# Patient Record
Sex: MALE | Race: WHITE | Employment: FULL TIME | URBAN - METROPOLITAN AREA
[De-identification: names, ages, dates, MRNs, and addresses within clinical notes are randomized per-mention and may not be internally consistent; named-entity substitution may affect disease eponyms.]

---

## 2017-01-13 ENCOUNTER — ALLIED HEALTH/NURSE VISIT (OUTPATIENT)
Dept: FAMILY MEDICINE | Facility: CLINIC | Age: 57
End: 2017-01-13
Payer: COMMERCIAL

## 2017-01-13 DIAGNOSIS — E29.1 TESTICULAR HYPOFUNCTION: Primary | ICD-10-CM

## 2017-01-13 PROCEDURE — 96372 THER/PROPH/DIAG INJ SC/IM: CPT

## 2017-01-13 PROCEDURE — 99207 ZZC NO CHARGE NURSE ONLY: CPT

## 2017-02-17 ENCOUNTER — ALLIED HEALTH/NURSE VISIT (OUTPATIENT)
Dept: FAMILY MEDICINE | Facility: CLINIC | Age: 57
End: 2017-02-17
Payer: COMMERCIAL

## 2017-02-17 DIAGNOSIS — E29.1 TESTICULAR HYPOFUNCTION: Primary | ICD-10-CM

## 2017-02-17 PROCEDURE — 99207 ZZC NO CHARGE NURSE ONLY: CPT

## 2017-02-17 PROCEDURE — 96372 THER/PROPH/DIAG INJ SC/IM: CPT

## 2017-03-03 ENCOUNTER — ALLIED HEALTH/NURSE VISIT (OUTPATIENT)
Dept: FAMILY MEDICINE | Facility: CLINIC | Age: 57
End: 2017-03-03
Payer: COMMERCIAL

## 2017-03-03 DIAGNOSIS — E29.1 TESTICULAR HYPOFUNCTION: Primary | ICD-10-CM

## 2017-03-03 PROCEDURE — 96372 THER/PROPH/DIAG INJ SC/IM: CPT

## 2017-03-03 PROCEDURE — 99207 ZZC NO CHARGE LOS: CPT

## 2017-03-17 ENCOUNTER — ALLIED HEALTH/NURSE VISIT (OUTPATIENT)
Dept: FAMILY MEDICINE | Facility: CLINIC | Age: 57
End: 2017-03-17
Payer: COMMERCIAL

## 2017-03-17 DIAGNOSIS — E29.1 TESTICULAR HYPOFUNCTION: Primary | ICD-10-CM

## 2017-03-17 PROCEDURE — 96372 THER/PROPH/DIAG INJ SC/IM: CPT

## 2017-03-17 PROCEDURE — 99207 ZZC NO CHARGE NURSE ONLY: CPT

## 2017-03-31 ENCOUNTER — ALLIED HEALTH/NURSE VISIT (OUTPATIENT)
Dept: FAMILY MEDICINE | Facility: CLINIC | Age: 57
End: 2017-03-31
Payer: COMMERCIAL

## 2017-03-31 DIAGNOSIS — E29.1 TESTICULAR HYPOFUNCTION: Primary | ICD-10-CM

## 2017-03-31 PROCEDURE — 99207 ZZC NO CHARGE NURSE ONLY: CPT

## 2017-03-31 PROCEDURE — 96372 THER/PROPH/DIAG INJ SC/IM: CPT

## 2017-04-10 DIAGNOSIS — E29.1 HYPOGONADISM MALE: ICD-10-CM

## 2017-04-10 NOTE — TELEPHONE ENCOUNTER
Left message for patient to return my call regarding Testosterone letter/injections. Patient was advised to ask for Drew SMITH.

## 2017-04-12 RX ORDER — TESTOSTERONE CYPIONATE 200 MG/ML
200 INJECTION, SOLUTION INTRAMUSCULAR
Qty: 1 ML | Refills: 5 | Status: CANCELLED | OUTPATIENT
Start: 2017-04-12

## 2017-04-12 NOTE — TELEPHONE ENCOUNTER
Joleen,    Patient is contacted and he plans to continue testosterone injections with us.  I see he has two testosterone prescriptions.?  I have pended one for printout for our pharmacy. Thank you, Dominique KOLB RN

## 2017-04-13 NOTE — TELEPHONE ENCOUNTER
Dr. Wallace or Covering Urology provider,    New policy for testosterone injections.  Need New Rx with 6 fills signed, printed and then taken over to our [facility]. I do see 2 Rx's for testosterone for this patient also.  Thank You, Dominique KOLB RN

## 2017-04-13 NOTE — TELEPHONE ENCOUNTER
LOV 5/2017  Last lab 8/2017    Please advise on refill and fill if appropriate.  See notes below.  Ninfa LOYOLA RN BSN PHN  Specialty Clinics

## 2017-04-20 DIAGNOSIS — E29.1 HYPOGONADISM MALE: ICD-10-CM

## 2017-04-20 RX ORDER — TESTOSTERONE CYPIONATE 200 MG/ML
200 INJECTION, SOLUTION INTRAMUSCULAR
Qty: 1 ML | Refills: 5 | OUTPATIENT
Start: 2017-04-20

## 2017-04-20 NOTE — TELEPHONE ENCOUNTER
Could you please send/bring a new Rx over for Testosterone injection?  The patient has an appointment for tomorrow 4/20/17 to receive the injection.    Thank you,  Batsheva Suero - Pharmacy Technician  [facility]  [phone]

## 2017-04-21 NOTE — TELEPHONE ENCOUNTER
Pt needs a new prescription to replace the current script for testosterone due to the changes with pharmacy. Medication ordered and pended for signature. Pt would like script to be brought to [facility] pharmacy and to be called when script is ready to make appt for injection. Please sign script.   Ninfa LOYOLA RN BSN PHN  Specialty Clinics

## 2017-04-25 RX ORDER — TESTOSTERONE CYPIONATE 200 MG/ML
200 INJECTION, SOLUTION INTRAMUSCULAR
Qty: 1 ML | Refills: 5 | Status: SHIPPED | OUTPATIENT
Start: 2017-04-25 | End: 2018-02-19

## 2017-05-12 ENCOUNTER — OFFICE VISIT (OUTPATIENT)
Dept: FAMILY MEDICINE | Facility: CLINIC | Age: 57
End: 2017-05-12
Payer: COMMERCIAL

## 2017-05-12 VITALS
SYSTOLIC BLOOD PRESSURE: 130 MMHG | RESPIRATION RATE: 16 BRPM | HEART RATE: 69 BPM | WEIGHT: 281.9 LBS | OXYGEN SATURATION: 94 % | BODY MASS INDEX: 40.36 KG/M2 | DIASTOLIC BLOOD PRESSURE: 88 MMHG | TEMPERATURE: 97.1 F | HEIGHT: 70 IN

## 2017-05-12 DIAGNOSIS — Z00.00 ROUTINE GENERAL MEDICAL EXAMINATION AT A HEALTH CARE FACILITY: Primary | ICD-10-CM

## 2017-05-12 DIAGNOSIS — E66.01 MORBID OBESITY DUE TO EXCESS CALORIES (H): ICD-10-CM

## 2017-05-12 DIAGNOSIS — R35.0 URINARY FREQUENCY: ICD-10-CM

## 2017-05-12 DIAGNOSIS — I10 BENIGN ESSENTIAL HYPERTENSION: ICD-10-CM

## 2017-05-12 LAB
ANION GAP SERPL CALCULATED.3IONS-SCNC: 8 MMOL/L (ref 3–14)
BUN SERPL-MCNC: 21 MG/DL (ref 7–30)
CALCIUM SERPL-MCNC: 9.4 MG/DL (ref 8.5–10.1)
CHLORIDE SERPL-SCNC: 103 MMOL/L (ref 94–109)
CHOLEST SERPL-MCNC: 172 MG/DL
CO2 SERPL-SCNC: 30 MMOL/L (ref 20–32)
CREAT SERPL-MCNC: 0.92 MG/DL (ref 0.66–1.25)
GFR SERPL CREATININE-BSD FRML MDRD: 86 ML/MIN/1.7M2
GLUCOSE SERPL-MCNC: 92 MG/DL (ref 70–99)
HDLC SERPL-MCNC: 48 MG/DL
LDLC SERPL CALC-MCNC: 106 MG/DL
NONHDLC SERPL-MCNC: 124 MG/DL
POTASSIUM SERPL-SCNC: 4.1 MMOL/L (ref 3.4–5.3)
SODIUM SERPL-SCNC: 141 MMOL/L (ref 133–144)
TRIGL SERPL-MCNC: 91 MG/DL

## 2017-05-12 PROCEDURE — 99396 PREV VISIT EST AGE 40-64: CPT | Performed by: NURSE PRACTITIONER

## 2017-05-12 PROCEDURE — 80048 BASIC METABOLIC PNL TOTAL CA: CPT | Performed by: NURSE PRACTITIONER

## 2017-05-12 PROCEDURE — 80061 LIPID PANEL: CPT | Performed by: NURSE PRACTITIONER

## 2017-05-12 PROCEDURE — 36415 COLL VENOUS BLD VENIPUNCTURE: CPT | Performed by: NURSE PRACTITIONER

## 2017-05-12 RX ORDER — HYDROCHLOROTHIAZIDE 25 MG/1
25 TABLET ORAL DAILY
Qty: 90 TABLET | Refills: 3 | Status: SHIPPED | OUTPATIENT
Start: 2017-05-12 | End: 2018-06-30

## 2017-05-12 RX ORDER — LOSARTAN POTASSIUM 100 MG/1
100 TABLET ORAL DAILY
Qty: 90 TABLET | Refills: 3 | Status: SHIPPED | OUTPATIENT
Start: 2017-05-12 | End: 2018-06-30

## 2017-05-12 RX ORDER — TAMSULOSIN HYDROCHLORIDE 0.4 MG/1
0.4 CAPSULE ORAL DAILY
Qty: 90 CAPSULE | Refills: 3 | Status: SHIPPED | OUTPATIENT
Start: 2017-05-12 | End: 2018-06-30

## 2017-05-12 NOTE — PROGRESS NOTES
SUBJECTIVE:     CC: Foreign Pond is an 56 year old male who presents for preventative health visit.     Healthy Habits:    Do you get at least three servings of calcium containing foods daily (dairy, green leafy vegetables, etc.)? yes    Amount of exercise or daily activities, outside of work: 7 day(s) per week/ 30-60 min    Problems taking medications regularly No    Medication side effects: No    Have you had an eye exam in the past two years? yes    Do you see a dentist twice per year? Yes    Do you have sleep apnea, excessive snoring or daytime drowsiness? Snores at night, less from losing weight      Hypertension Follow-up      Outpatient blood pressures are not being checked.    Low Salt Diet: no added salt       Today's PHQ-2 Score:   PHQ-2 ( 1999 Pf[employer]) 5/12/2017 7/19/2016   Q1: Little interest or pleasure in doing things 0 0   Q2: Feeling down, depressed or hopeless 0 0   PHQ-2 Score 0 0       Abuse: Current or Past(Physical, Sexual or Emotional)- No  Do you feel safe in your environment - Yes    Social History   Substance Use Topics     Smoking status: Never Smoker     Smokeless tobacco: Never Used     Alcohol use No     The patient does not drink >3 drinks per day nor >7 drinks per week.    Last PSA:   PSA   Date Value Ref Range Status   05/11/2015 0.56 0 - 4 ug/L Final       Recent Labs   Lab Test  12/18/15   0855  10/20/14   1009  06/29/13   0724   CHOL  172  182  184   HDL  43  41  36*   LDL  111*  114  105   TRIG  92  133  217*   CHOLHDLRATIO   --   4.4  5.0   NHDL  129   --    --      The 10-year ASCVD risk score (Bebetoamira WALTON Jr, et al., 2013) is: 5.9%    Values used to calculate the score:      Age: 56 years      Sex: Male      Is Non- : No      Diabetic: No      Tobacco smoker: No      Systolic Blood Pressure: 130 mmHg      Is BP treated: Yes      HDL Cholesterol: 43 mg/dL      Total Cholesterol: 172 mg/dL    Patient is eligible for use of low-dose aspirin for primary  "prevention of heart attack and stroke.  Provider has discussed aspirin with patient and our decision was:     Not Indicated:  Daily low-dose aspirin recommended for primary prevention, patient not eligible.        Reviewed orders with patient. Reviewed health maintenance and updated orders accordingly - Yes    Reviewed and updated as needed this visit by clinical staff  Tobacco  Allergies  Med Hx  Surg Hx  Fam Hx  Soc Hx        Reviewed and updated as needed this visit by Provider            ROS:  C: NEGATIVE for fever, chills, change in weight  I: NEGATIVE for worrisome rashes, moles or lesions  E: NEGATIVE for vision changes or irritation  ENT: NEGATIVE for ear, mouth and throat problems  R: NEGATIVE for significant cough or SOB  CV: NEGATIVE for chest pain, palpitations or peripheral edema  GI: NEGATIVE for nausea, abdominal pain, heartburn, or change in bowel habits   male: negative for dysuria, hematuria, decreased urinary stream, erectile dysfunction, urethral discharge  M: NEGATIVE for significant arthralgias or myalgia  N: NEGATIVE for weakness, dizziness or paresthesias  P: NEGATIVE for changes in mood or affect    Problem list, Medication list, Allergies, and Medical/Social/Surgical histories reviewed in [facility] and updated as appropriate.      OBJECTIVE:     /88  Pulse 69  Temp 97.1  F (36.2  C) (Tympanic)  Resp 16  Ht 5' 10.25\" (1.784 m)  Wt 281 lb 14.4 oz (127.9 kg)  SpO2 94%  BMI 40.16 kg/m2  EXAM:  GENERAL: healthy, alert and no distress  EYES: Eyes grossly normal to inspection, PERRL and conjunctivae and sclerae normal  HENT: ear canals and TM's normal, nose and mouth without ulcers or lesions  NECK: no adenopathy, no asymmetry, masses, or scars and thyroid normal to palpation  RESP: lungs clear to auscultation - no rales, rhonchi or wheezes  CV: regular rate and rhythm, normal S1 S2, no S3 or S4, no murmur, click or rub, no peripheral edema and peripheral pulses strong  ABDOMEN: " "soft, nontender, no hepatosplenomegaly, no masses and bowel sounds normal  MS: no gross musculoskeletal defects noted, no edema  NEURO: Normal strength and tone, mentation intact and speech normal  PSYCH: mentation appears normal, affect normal/bright    ASSESSMENT/PLAN:         ICD-10-CM    1. Routine general medical examination at a health care facility Z00.00 Lipid panel reflex to direct LDL     DISCONTINUED: ASPIRIN NOT PRESCRIBED (INTENTIONAL)   2. Morbid obesity due to excess calories (H) E66.01    3. Benign essential hypertension I10 losartan (COZAAR) 100 MG tablet     hydrochlorothiazide (HYDRODIURIL) 25 MG tablet     Basic metabolic panel   4. Urinary frequency R35.0 tamsulosin (FLOMAX) 0.4 MG capsule       COUNSELING:  Reviewed preventive health counseling, as reflected in patient instructions         reports that he has never smoked. He has never used smokeless tobacco.    Estimated body mass index is 40.16 kg/(m^2) as calculated from the following:    Height as of this encounter: 5' 10.25\" (1.784 m).    Weight as of this encounter: 281 lb 14.4 oz (127.9 kg).   Weight management plan: Discussed healthy diet and exercise guidelines and patient will follow up in 12 months in clinic to re-evaluate.      The risks, benefits and treatment options of prescribed medications or other treatments have been discussed with the patient. The patient verbalized their understanding and should call or follow up if no improvement or if they develop further problems.    RAVEN Benito [facility]  "

## 2017-05-12 NOTE — MR AVS SNAPSHOT
After Visit Summary   5/12/2017    Foreign Pond    MRN: [id number]           Patient Information     Date Of Birth          1960        Visit Information        Provider Department      5/12/2017 9:20 AM Joleen Garcia APRN [facility]        Today's Diagnoses     Routine general medical examination at a health care facility    -  1    Morbid obesity due to excess calories (H)        Benign essential hypertension        Urinary frequency          Care Instructions      Preventive Health Recommendations  Male Ages 50 - 64    Yearly exam:             See your health care provider every year in order to  o   Review health changes.   o   Discuss preventive care.    o   Review your medicines if your doctor has prescribed any.     Have a cholesterol test every 5 years, or more frequently if you are at risk for high cholesterol/heart disease.     Have a diabetes test (fasting glucose) every three years. If you are at risk for diabetes, you should have this test more often.     Have a colonoscopy at age 50, or have a yearly FIT test (stool test). These exams will check for colon cancer.      Talk with your health care provider about whether or not a prostate cancer screening test (PSA) is right for you.    You should be tested each year for STDs (sexually transmitted diseases), if you re at risk.     Shots: Get a flu shot each year. Get a tetanus shot every 10 years.     Nutrition:    Eat at least 5 servings of fruits and vegetables daily.     Eat whole-grain bread, whole-wheat pasta and brown rice instead of white grains and rice.     Talk to your provider about Calcium and Vitamin D.     Lifestyle    Exercise for at least 150 minutes a week (30 minutes a day, 5 days a week). This will help you control your weight and prevent disease.     Limit alcohol to one drink per day.     No smoking.     Wear sunscreen to prevent skin cancer.     See your dentist every six months  "for an exam and cleaning.     See your eye doctor every 1 to 2 years.          Follow-ups after your visit        Who to contact     If you have questions or need follow up information about today's clinic visit or your schedule please contact [facility] directly at [phone].  Normal or non-critical lab and imaging results will be communicated to you by MyChart, letter or phone within 4 business days after the clinic has received the results. If you do not hear from us within 7 days, please contact the clinic through MyChart or phone. If you have a critical or abnormal lab result, we will notify you by phone as soon as possible.  Submit refill requests through Next Caller or call your pharmacy and they will forward the refill request to us. Please allow 3 business days for your refill to be completed.          Additional Information About Your Visit        MyChart Information     Next Caller lets you send messages to your doctor, view your test results, renew your prescriptions, schedule appointments and more. To sign up, go to [email or web address] . Click on \"Log in\" on the left side of the screen, which will take you to the Welcome page. Then click on \"Sign up Now\" on the right side of the page.     You will be asked to enter the access code listed below, as well as some personal information. Please follow the directions to create your username and password.     Your access code is: [email or web address]  Expires: 2017 11:38 AM     Your access code will  in 90 days. If you need help or a new code, please call your [facility] or [phone].        Care EveryWhere ID     This is your Care EveryWhere ID. This could be used by other organizations to access your [place] medical records  [id number]        Your Vitals Were     Pulse Temperature Respirations Height Pulse Oximetry BMI (Body Mass Index)    69 97.1  F (36.2  C) (Tympanic) 16 5' 10.25\" (1.784 m) 94% 40.16 kg/m2       Blood Pressure " from Last 3 Encounters:   05/12/17 (!) 131/91   12/12/16 113/73   07/19/16 135/73    Weight from Last 3 Encounters:   05/12/17 281 lb 14.4 oz (127.9 kg)   12/12/16 288 lb (130.6 kg)   10/28/16 (!) 306 lb (138.8 kg)              We Performed the Following     Basic metabolic panel     Lipid panel reflex to direct LDL          Today's Medication Changes          These changes are accurate as of: 5/12/17  9:39 AM.  If you have any questions, ask your nurse or doctor.               Start taking these medicines.        Dose/Directions    ASPIRIN NOT PRESCRIBED   Commonly known as:  INTENTIONAL   Used for:  Routine general medical examination at a health care facility   Started by:  Joleen Garcia APRN CNP        Please choose reason not prescribed, below   Quantity:  1 each   Refills:  0            Where to get your medicines      These medications were sent to [id number] [address], [facility] [address]     Phone:  [phone]     hydrochlorothiazide 25 MG tablet    losartan 100 MG tablet    tamsulosin 0.4 MG capsule         Some of these will need a paper prescription and others can be bought over the counter.  Ask your nurse if you have questions.     You don't need a prescription for these medications     ASPIRIN NOT PRESCRIBED                Primary Care Provider Office Phone # Fax #    RAVEN Ulloa [phone]       [facility] [address] [facility] [address]        Thank you!     Thank you for choosing [facility]  for your care. Our goal is always to provide you with excellent care. Hearing back from our patients is one way we can continue to improve our services. Please take a few minutes to complete the written survey that you may receive in the mail after your visit with us. Thank you!             Your Updated Medication List - Protect others around you:  "Learn how to safely use, store and throw away your medicines at www.disposemymeds.org.          This list is accurate as of: 5/12/17  9:39 AM.  Always use your most recent med list.                   Brand Name Dispense Instructions for use    aspirin 81 MG tablet     1 tablet    Take 1 tablet by mouth daily.       ASPIRIN NOT PRESCRIBED    INTENTIONAL    1 each    Please choose reason not prescribed, below       hydrochlorothiazide 25 MG tablet    HYDRODIURIL    90 tablet    Take 1 tablet (25 mg) by mouth daily       losartan 100 MG tablet    COZAAR    90 tablet    Take 1 tablet (100 mg) by mouth daily       tamsulosin 0.4 MG capsule    FLOMAX    90 capsule    Take 1 capsule (0.4 mg) by mouth daily       * testosterone cypionate 200 MG/ML injection    DEPOTESTOTERONE    1 mL    Inject 1 mL (200 mg) into the muscle every 14 days       * testosterone cypionate 200 MG/ML injection    DEPOTESTOTERONE    1 mL    Inject 1 mL (200 mg) into the muscle every 14 days To be given in clinic       UNABLE TO FIND      States he takes 5 tablets of \"isogenic vitamins\"       * Notice:  This list has 2 medication(s) that are the same as other medications prescribed for you. Read the directions carefully, and ask your doctor or other care provider to review them with you.      "

## 2017-05-12 NOTE — LETTER
[facility]  [address] [facility] [address]  [phone]      May 12, 2017      Foreign Pond  [address] [facility] [address]        Dear Foreign,        We are writing to inform you of the results from your recent lab work, included is a copy of those results.  Component      Latest Ref Rng & Units 5/12/2017   Sodium      133 - 144 mmol/L 141   Potassium      3.4 - 5.3 mmol/L 4.1   Chloride      94 - 109 mmol/L 103   Carbon Dioxide      20 - 32 mmol/L 30   Anion Gap      3 - 14 mmol/L 8   Glucose      70 - 99 mg/dL 92   Urea Nitrogen      7 - 30 mg/dL 21   Creatinine      0.66 - 1.25 mg/dL 0.92   GFR Estimate      >60 mL/min/1.7m2 86   GFR Estimate If Black      >60 mL/min/1.7m2 >90 . . .   Calcium      8.5 - 10.1 mg/dL 9.4   Cholesterol      <200 mg/dL 172   Triglycerides      <150 mg/dL 91   HDL Cholesterol      >39 mg/dL 48   LDL Cholesterol Calculated      <100 mg/dL 106 (H)   Non HDL Cholesterol      <130 mg/dL 124     Blood sugar is normal.  Kidney function and electrolytes are normal.    Cholesterol is unchanged from last year - decreased from 2-3 years ago.    If you have any questions, please do not hesitate to call our office.      Sincerely,  Joleen Garcia, CNP

## 2017-05-12 NOTE — NURSING NOTE
"Chief Complaint   Patient presents with     Physical     Boy        Initial BP (!) 131/91 (BP Location: Right arm, Patient Position: Chair, Cuff Size: Adult Large)  Pulse 69  Temp 97.1  F (36.2  C) (Tympanic)  Resp 16  Ht 5' 10.25\" (1.784 m)  Wt 281 lb 14.4 oz (127.9 kg)  SpO2 94%  BMI 40.16 kg/m2 Estimated body mass index is 40.16 kg/(m^2) as calculated from the following:    Height as of this encounter: 5' 10.25\" (1.784 m).    Weight as of this encounter: 281 lb 14.4 oz (127.9 kg).  Medication Reconciliation: complete  "

## 2017-05-19 ENCOUNTER — ALLIED HEALTH/NURSE VISIT (OUTPATIENT)
Dept: FAMILY MEDICINE | Facility: CLINIC | Age: 57
End: 2017-05-19
Payer: COMMERCIAL

## 2017-05-19 DIAGNOSIS — E29.1 TESTICULAR HYPOFUNCTION: Primary | ICD-10-CM

## 2017-05-19 PROCEDURE — 99207 ZZC NO CHARGE NURSE ONLY: CPT

## 2017-06-02 ENCOUNTER — ALLIED HEALTH/NURSE VISIT (OUTPATIENT)
Dept: FAMILY MEDICINE | Facility: CLINIC | Age: 57
End: 2017-06-02
Payer: COMMERCIAL

## 2017-06-02 DIAGNOSIS — E29.1 TESTICULAR HYPOFUNCTION: Primary | ICD-10-CM

## 2017-06-02 PROCEDURE — 96372 THER/PROPH/DIAG INJ SC/IM: CPT

## 2017-06-02 PROCEDURE — 99207 ZZC NO CHARGE NURSE ONLY: CPT

## 2017-06-16 ENCOUNTER — ALLIED HEALTH/NURSE VISIT (OUTPATIENT)
Dept: FAMILY MEDICINE | Facility: CLINIC | Age: 57
End: 2017-06-16
Payer: COMMERCIAL

## 2017-06-16 DIAGNOSIS — E29.1 TESTICULAR HYPOFUNCTION: Primary | ICD-10-CM

## 2017-06-16 PROCEDURE — 99207 ZZC NO CHARGE NURSE ONLY: CPT

## 2017-06-30 ENCOUNTER — OFFICE VISIT (OUTPATIENT)
Dept: FAMILY MEDICINE | Facility: CLINIC | Age: 57
End: 2017-06-30
Payer: COMMERCIAL

## 2017-06-30 DIAGNOSIS — E29.1 TESTICULAR HYPOFUNCTION: Primary | ICD-10-CM

## 2017-06-30 PROCEDURE — 99207 ZZC NO CHARGE NURSE ONLY: CPT

## 2017-06-30 PROCEDURE — 96372 THER/PROPH/DIAG INJ SC/IM: CPT

## 2017-06-30 NOTE — MR AVS SNAPSHOT
"              After Visit Summary   6/30/2017    Foreign Pond    MRN: [id number]           Patient Information     Date Of Birth          1960        Visit Information        Provider Department      6/30/2017 9:00 AM [place] FP/IM RN [facility]        Today's Diagnoses     Testicular hypofunction    -  1       Follow-ups after your visit        Your next 10 appointments already scheduled     Jul 14, 2017  9:00 AM CDT   [facility]   [facility]    [address] [facility] [address] [phone]            Jul 28, 2017  9:00 AM CDT   SHORT with [place] [facility] FP/IM RN   [facility]    [address] [facility] [facility] [address] [phone]              Who to contact     If you have questions or need follow up information about today's clinic visit or your schedule please contact [facility] directly at [phone].  Normal or non-critical lab and imaging results will be communicated to you by [employer], letter or phone within 4 business days after the clinic has received the results. If you do not hear from us within 7 days, please contact the clinic through [employer] or phone. If you have a critical or abnormal lab result, we will notify you by phone as soon as possible.  Submit refill requests through COGEON or call your pharmacy and they will forward the refill request to us. Please allow 3 business days for your refill to be completed.          Additional Information About Your Visit        [employer] media Information     COGEON lets you send messages to your doctor, view your test results, renew your prescriptions, schedule appointments and more. To sign up, go to [email or web address] . Click on \"Log in\" on the left side of the screen, which will take you to the Welcome page. Then click on \"Sign up Now\" on the right side of the page.     You will be asked to enter the access code " listed below, as well as some personal information. Please follow the directions to create your username and password.     Your access code is: [email or web address]  Expires: 2017  9:21 AM     Your access code will  in 90 days. If you need help or a new code, please call your [place] clinic or [phone].        Care EveryWhere ID     This is your Care EveryWhere ID. This could be used by other organizations to access your [place] medical records  [id number]         Blood Pressure from Last 3 Encounters:   17 130/88   16 113/73   16 135/73    Weight from Last 3 Encounters:   17 281 lb 14.4 oz (127.9 kg)   16 288 lb (130.6 kg)   10/28/16 (!) 306 lb (138.8 kg)              We Performed the Following     INJECTION INTRAMUSCULAR OR SUB-Q        Primary Care Provider Office Phone # Fax #    Joleen Geiger Ta Garcia, RAVEN [facility] [phone]       [facility] [address] [facility] [address]        Equal Access to Services     EDDIE PASCUAL : Hadii aad ku hadasho Soomaali, waaxda luqadaha, qaybta kaalmada adeegyada, waxay idiin haymarysen albino hernandez . So [facility] [phone].    ATENCIÓN: Si habla español, tiene a barrett disposición servicios gratuitos de asistencia lingüística. Llame al [phone].    We comply with applicable federal civil rights laws and [place] laws. We do not discriminate on the basis of race, color, national origin, age, disability sex, sexual orientation or gender identity.            Thank you!     Thank you for choosing [facility]  for your care. Our goal is always to provide you with excellent care. Hearing back from our patients is one way we can continue to improve our services. Please take a few minutes to complete the written survey that you may receive in the mail after your visit with us. Thank you!             Your Updated Medication List - Protect others around you: Learn how to safely use, store and  "throw away your medicines at www.disposemymeds.org.          This list is accurate as of: 6/30/17  9:14 AM.  Always use your most recent med list.                   Brand Name Dispense Instructions for use Diagnosis    aspirin 81 MG tablet     1 tablet    Take 1 tablet by mouth daily.    HTN (hypertension)       hydrochlorothiazide 25 MG tablet    HYDRODIURIL    90 tablet    Take 1 tablet (25 mg) by mouth daily    Benign essential hypertension       losartan 100 MG tablet    COZAAR    90 tablet    Take 1 tablet (100 mg) by mouth daily    Benign essential hypertension       tamsulosin 0.4 MG capsule    FLOMAX    90 capsule    Take 1 capsule (0.4 mg) by mouth daily    Urinary frequency       testosterone cypionate 200 MG/ML injection    DEPOTESTOTERONE    1 mL    Inject 1 mL (200 mg) into the muscle every 14 days To be given in clinic    Hypogonadism male       UNABLE TO FIND      States he takes 5 tablets of \"isogenic vitamins\"          "

## 2017-07-28 ENCOUNTER — OFFICE VISIT (OUTPATIENT)
Dept: FAMILY MEDICINE | Facility: CLINIC | Age: 57
End: 2017-07-28
Payer: COMMERCIAL

## 2017-07-28 DIAGNOSIS — E29.1 TESTICULAR HYPOFUNCTION: Primary | ICD-10-CM

## 2017-07-28 PROCEDURE — 99207 ZZC NO CHARGE NURSE ONLY: CPT

## 2017-07-28 NOTE — MR AVS SNAPSHOT
"              After Visit Summary   7/28/2017    Foreign Pond    MRN: [id number]           Patient Information     Date Of Birth          1960        Visit Information        Provider Department      7/28/2017 9:00 AM [place] FP/IM RN [facility]        Today's Diagnoses     Testicular hypofunction    -  1       Follow-ups after your visit        Your next 10 appointments already scheduled     Aug 11, 2017  9:00 AM CDT   SHORT with [place] FP/IM RN   [facility]    [address] [address]   [phone]            Aug 25, 2017  9:00 AM CDT   SHORT with [place] FP/IM RN   [facility]    [address] [address]   [phone]            Sep 08, 2017  9:00 AM CDT   SHORT with [place] FP/IM RN   [facility]    [address] [address]   [phone]              Who to contact     If you have questions or need follow up information about today's clinic visit or your schedule please contact [facility] directly at [phone].  Normal or non-critical lab and imaging results will be communicated to you by Novaluxhart, letter or phone within 4 business days after the clinic has received the results. If you do not hear from us within 7 days, please contact the clinic through Novaluxhart or phone. If you have a critical or abnormal lab result, we will notify you by phone as soon as possible.  Submit refill requests through Beryllium or call your pharmacy and they will forward the refill request to us. Please allow 3 business days for your refill to be completed.          Additional Information About Your Visit        NovaluxharHomeSpace Information     Beryllium lets you send messages to your doctor, view your test results, renew your prescriptions, schedule appointments and more. To sign up, go to [email or web address] . Click on \"Log " "in\" on the left side of the screen, which will take you to the Welcome page. Then click on \"Sign up Now\" on the right side of the page.     You will be asked to enter the access code listed below, as well as some personal information. Please follow the directions to create your username and password.     Your access code is: [email or web address]  Expires: 2017  9:21 AM     Your access code will  in 90 days. If you need help or a new code, please call your [facility] clinic or [phone].        Care EveryWhere ID     This is your Care EveryWhere ID. This could be used by other organizations to access your [facility] medical records  [id number]         Blood Pressure from Last 3 Encounters:   17 130/88   16 113/73   16 135/73    Weight from Last 3 Encounters:   17 281 lb 14.4 oz (127.9 kg)   16 288 lb (130.6 kg)   10/28/16 (!) 306 lb (138.8 kg)              Today, you had the following     No orders found for display       Primary Care Provider Office Phone # Fax #    Joleen Geiger RAVEN [facility] [phone]       [facility] [address] [address]        Equal Access to Services     EDDIE PASCUAL : Hadii aad ku hadasho Soaleshaali, waaxda luqadaha, qaybta kaalmada adeegyada, samantha bhatti. So [facility] [phone].    ATENCIÓN: Si habla español, tiene a barrett disposición servicios gratuitos de asistencia lingüística. Dorothy al [phone].    We comply with applicable federal civil rights laws and [place] laws. We do not discriminate on the basis of race, color, national origin, age, disability sex, sexual orientation or gender identity.            Thank you!     Thank you for choosing [facility]  for your care. Our goal is always to provide you with excellent care. Hearing back from our patients is one way we can continue to improve our services. Please take a few minutes to complete the written survey " "that you may receive in the mail after your visit with us. Thank you!             Your Updated Medication List - Protect others around you: Learn how to safely use, store and throw away your medicines at www.disposemymeds.org.          This list is accurate as of: 7/28/17 11:59 PM.  Always use your most recent med list.                   Brand Name Dispense Instructions for use Diagnosis    aspirin 81 MG tablet     1 tablet    Take 1 tablet by mouth daily.    HTN (hypertension)       hydrochlorothiazide 25 MG tablet    HYDRODIURIL    90 tablet    Take 1 tablet (25 mg) by mouth daily    Benign essential hypertension       losartan 100 MG tablet    COZAAR    90 tablet    Take 1 tablet (100 mg) by mouth daily    Benign essential hypertension       tamsulosin 0.4 MG capsule    FLOMAX    90 capsule    Take 1 capsule (0.4 mg) by mouth daily    Urinary frequency       testosterone cypionate 200 MG/ML injection    DEPOTESTOTERONE    1 mL    Inject 1 mL (200 mg) into the muscle every 14 days To be given in clinic    Hypogonadism male       UNABLE TO FIND      States he takes 5 tablets of \"isogenic vitamins\"          "

## 2017-08-11 ENCOUNTER — OFFICE VISIT (OUTPATIENT)
Dept: FAMILY MEDICINE | Facility: CLINIC | Age: 57
End: 2017-08-11
Payer: COMMERCIAL

## 2017-08-11 DIAGNOSIS — E29.1 TESTICULAR HYPOFUNCTION: Primary | ICD-10-CM

## 2017-08-11 PROCEDURE — 99207 ZZC NO CHARGE NURSE ONLY: CPT

## 2017-08-11 PROCEDURE — 96372 THER/PROPH/DIAG INJ SC/IM: CPT

## 2017-08-11 NOTE — MR AVS SNAPSHOT
"              After Visit Summary   8/11/2017    [place]    MRN: [id number]           Patient Information     Date Of Birth          1960        Visit Information        Provider Department      8/11/2017 9:00 AM [place] FP/IM RN [facility]        Today's Diagnoses     Testicular hypofunction    -  1       Follow-ups after your visit        Your next 10 appointments already scheduled     Aug 25, 2017  9:00 AM CDT   SHORT with [facility] FP/IM RN   [facility]    [address] [address]   [phone]            Sep 08, 2017  9:00 AM CDT   SHORT with [place] FP/IM RN   [facility]    [address] [address]   [phone]              Who to contact     If you have questions or need follow up information about today's clinic visit or your schedule please contact [facility] directly at [phone].  Normal or non-critical lab and imaging results will be communicated to you by Canadian Digital Media Networkhart, letter or phone within 4 business days after the clinic has received the results. If you do not hear from us within 7 days, please contact the clinic through Canadian Digital Media Networkhart or phone. If you have a critical or abnormal lab result, we will notify you by phone as soon as possible.  Submit refill requests through BCNX or call your pharmacy and they will forward the refill request to us. Please allow 3 business days for your refill to be completed.          Additional Information About Your Visit        Canadian Digital Media NetworkharInvictus Marketing Information     BCNX lets you send messages to your doctor, view your test results, renew your prescriptions, schedule appointments and more. To sign up, go to [email or web address] . Click on \"Log in\" on the left side of the screen, which will take you to the Welcome page. Then click on \"Sign up Now\" on the right side of the page.     You will be asked to enter the access code " listed below, as well as some personal information. Please follow the directions to create your username and password.     Your access code is: [email or web address]  Expires: 2017  9:21 AM     Your access code will  in 90 days. If you need help or a new code, please call your [facility] clinic or [phone].        Care EveryWhere ID     This is your Care EveryWhere ID. This could be used by other organizations to access your [facility] medical records  [id number]         Blood Pressure from Last 3 Encounters:   17 130/88   16 113/73   16 135/73    Weight from Last 3 Encounters:   17 281 lb 14.4 oz (127.9 kg)   16 288 lb (130.6 kg)   10/28/16 (!) 306 lb (138.8 kg)              We Performed the Following     THER/PROPH/DIAG INJ, SC/IM        Primary Care Provider Office Phone # Fax #    Joleen RAVEN [facility] [phone] [address] [address]        Equal Access to Services     EDDIE PASCUAL AH: Hadii aad ku hadasho Soomaali, waaxda luqadaha, qaybta kaalmada adeegyada, waxay idiin hayaan albino hernandez . So [facility] [phone].    ATENCIÓN: Si habla español, tiene a barrett disposición servicios gratuitos de asistencia lingüística. Llame al [phone].    We comply with applicable federal civil rights laws and [place] laws. We do not discriminate on the basis of race, color, national origin, age, disability sex, sexual orientation or gender identity.            Thank you!     Thank you for choosing [facility]  for your care. Our goal is always to provide you with excellent care. Hearing back from our patients is one way we can continue to improve our services. Please take a few minutes to complete the written survey that you may receive in the mail after your visit with us. Thank you!             Your Updated Medication List - Protect others around you: Learn how to safely use, store and throw away your medicines at  "www.disposemymeds.org.          This list is accurate as of: 8/11/17  9:00 AM.  Always use your most recent med list.                   Brand Name Dispense Instructions for use Diagnosis    aspirin 81 MG tablet     1 tablet    Take 1 tablet by mouth daily.    HTN (hypertension)       hydrochlorothiazide 25 MG tablet    HYDRODIURIL    90 tablet    Take 1 tablet (25 mg) by mouth daily    Benign essential hypertension       losartan 100 MG tablet    COZAAR    90 tablet    Take 1 tablet (100 mg) by mouth daily    Benign essential hypertension       tamsulosin 0.4 MG capsule    FLOMAX    90 capsule    Take 1 capsule (0.4 mg) by mouth daily    Urinary frequency       testosterone cypionate 200 MG/ML injection    DEPOTESTOTERONE    1 mL    Inject 1 mL (200 mg) into the muscle every 14 days To be given in clinic    Hypogonadism male       UNABLE TO FIND      States he takes 5 tablets of \"isogenic vitamins\"          "

## 2017-08-11 NOTE — NURSING NOTE
Patient here for testosterone injection. Dominique KOLB RN  Patient needs new Rx for testosterone and blood level drawn.  Patient is aware and will take care of this. Dominique KOLB RN

## 2017-10-17 ENCOUNTER — HOSPITAL ENCOUNTER (EMERGENCY)
Facility: CLINIC | Age: 57
Discharge: HOME | End: 2017-10-17
Attending: EMERGENCY MEDICINE | Admitting: EMERGENCY MEDICINE
Payer: COMMERCIAL

## 2017-10-17 ENCOUNTER — APPOINTMENT (OUTPATIENT)
Dept: GENERAL RADIOLOGY | Facility: CLINIC | Age: 57
End: 2017-10-17
Attending: EMERGENCY MEDICINE
Payer: COMMERCIAL

## 2017-10-17 VITALS
DIASTOLIC BLOOD PRESSURE: 79 MMHG | SYSTOLIC BLOOD PRESSURE: 111 MMHG | OXYGEN SATURATION: 93 % | RESPIRATION RATE: 14 BRPM | TEMPERATURE: 98.2 F

## 2017-10-17 DIAGNOSIS — M79.622 LEFT AXILLARY PAIN: ICD-10-CM

## 2017-10-17 DIAGNOSIS — M25.512 ACUTE PAIN OF LEFT SHOULDER: ICD-10-CM

## 2017-10-17 LAB
ANION GAP SERPL CALCULATED.3IONS-SCNC: 4 MMOL/L (ref 3–14)
BASOPHILS # BLD AUTO: 0 10E9/L (ref 0–0.2)
BASOPHILS NFR BLD AUTO: 0.4 %
BUN SERPL-MCNC: 24 MG/DL (ref 7–30)
CALCIUM SERPL-MCNC: 9.1 MG/DL (ref 8.5–10.1)
CHLORIDE SERPL-SCNC: 102 MMOL/L (ref 94–109)
CO2 SERPL-SCNC: 31 MMOL/L (ref 20–32)
CREAT SERPL-MCNC: 1.02 MG/DL (ref 0.66–1.25)
DIFFERENTIAL METHOD BLD: NORMAL
EOSINOPHIL # BLD AUTO: 0.1 10E9/L (ref 0–0.7)
EOSINOPHIL NFR BLD AUTO: 1.5 %
ERYTHROCYTE [DISTWIDTH] IN BLOOD BY AUTOMATED COUNT: 13.4 % (ref 10–15)
GFR SERPL CREATININE-BSD FRML MDRD: 76 ML/MIN/1.7M2
GLUCOSE SERPL-MCNC: 92 MG/DL (ref 70–99)
HCT VFR BLD AUTO: 44.6 % (ref 40–53)
HGB BLD-MCNC: 15.5 G/DL (ref 13.3–17.7)
IMM GRANULOCYTES # BLD: 0 10E9/L (ref 0–0.4)
IMM GRANULOCYTES NFR BLD: 0.1 %
LYMPHOCYTES # BLD AUTO: 1.2 10E9/L (ref 0.8–5.3)
LYMPHOCYTES NFR BLD AUTO: 16.1 %
MCH RBC QN AUTO: 29.1 PG (ref 26.5–33)
MCHC RBC AUTO-ENTMCNC: 34.8 G/DL (ref 31.5–36.5)
MCV RBC AUTO: 84 FL (ref 78–100)
MONOCYTES # BLD AUTO: 0.5 10E9/L (ref 0–1.3)
MONOCYTES NFR BLD AUTO: 6.8 %
NEUTROPHILS # BLD AUTO: 5.4 10E9/L (ref 1.6–8.3)
NEUTROPHILS NFR BLD AUTO: 75.1 %
PLATELET # BLD AUTO: 236 10E9/L (ref 150–450)
POTASSIUM SERPL-SCNC: 3.4 MMOL/L (ref 3.4–5.3)
RBC # BLD AUTO: 5.33 10E12/L (ref 4.4–5.9)
SODIUM SERPL-SCNC: 137 MMOL/L (ref 133–144)
TROPONIN I SERPL-MCNC: <0.015 UG/L (ref 0–0.04)
WBC # BLD AUTO: 7.2 10E9/L (ref 4–11)

## 2017-10-17 PROCEDURE — 84484 ASSAY OF TROPONIN QUANT: CPT | Performed by: EMERGENCY MEDICINE

## 2017-10-17 PROCEDURE — 71101 X-RAY EXAM UNILAT RIBS/CHEST: CPT | Mod: LT

## 2017-10-17 PROCEDURE — 73030 X-RAY EXAM OF SHOULDER: CPT | Mod: LT

## 2017-10-17 PROCEDURE — 80048 BASIC METABOLIC PNL TOTAL CA: CPT | Performed by: EMERGENCY MEDICINE

## 2017-10-17 PROCEDURE — 99285 EMERGENCY DEPT VISIT HI MDM: CPT

## 2017-10-17 PROCEDURE — 85025 COMPLETE CBC W/AUTO DIFF WBC: CPT | Performed by: EMERGENCY MEDICINE

## 2017-10-17 PROCEDURE — 93010 ELECTROCARDIOGRAM REPORT: CPT | Mod: Z6 | Performed by: EMERGENCY MEDICINE

## 2017-10-17 PROCEDURE — 99285 EMERGENCY DEPT VISIT HI MDM: CPT | Mod: 25 | Performed by: EMERGENCY MEDICINE

## 2017-10-17 PROCEDURE — 93005 ELECTROCARDIOGRAM TRACING: CPT

## 2017-10-17 NOTE — ED AVS SNAPSHOT
[facility] Emergency Department    [address] [address]    Phone:  [phone]    Fax:  [phone]                                       [place] P[place]   MRN: [id number]    Department:  [facility] Emergency Department   Date of Visit:  10/17/2017           After Visit Summary Signature Page     I have received my discharge instructions, and my questions have been answered. I have discussed any challenges I see with this plan with the nurse or doctor.    ..........................................................................................................................................  Patient/Patient Representative Signature      ..........................................................................................................................................  Patient Representative Print Name and Relationship to Patient    ..................................................               ................................................  Date                                            Time    ..........................................................................................................................................  Reviewed by Signature/Title    ...................................................              ..............................................  Date                                                            Time

## 2017-10-17 NOTE — LETTER
To Whom it may concern:      [place] was seen in our Emergency Department today, 10/17/17.  He may return to work tomorrow without limitation.    Sincerely,        Garthriley Herrera MD

## 2017-10-17 NOTE — ED AVS SNAPSHOT
[facility] Emergency Department    [address] [place] [address]    Phone:  [phone]    Fax:  [phone]                                       [place] Pond   MRN: [id number]    Department:  [facility] Emergency Department   Date of Visit:  10/17/2017           Patient Information     Date Of Birth          1960        Your diagnoses for this visit were:     Acute pain of left shoulder     Left axillary pain        You were seen by Garth Herrera MD.      Follow-up Information     Schedule an appointment as soon as possible for a visit with Parish Sosa MD.    Specialty:  Orthopedics    Why:  If symptoms worsen or if persist.    Contact information:    [address]  [place] [address]  [phone]        Discharge References/Attachments     ROTATOR CUFF TENDONITIS, UNDERSTANDING (ENGLISH)      24 Hour Appointment Hotline       To make an appointment at any [facility] clinic, call [phone] ([phone]). If you don't have a family doctor or clinic, we will help you find one. [facility] clinics are conveniently located to serve the needs of you and your family.             Review of your medicines      Our records show that you are taking the medicines listed below. If these are incorrect, please call your family doctor or clinic.        Dose / Directions Last dose taken    aspirin 81 MG tablet   Dose:  1 tablet   Quantity:  1 tablet        Take 1 tablet by mouth daily.   Refills:  3        hydrochlorothiazide 25 MG tablet   Commonly known as:  HYDRODIURIL   Dose:  25 mg   Quantity:  90 tablet        Take 1 tablet (25 mg) by mouth daily   Refills:  3        losartan 100 MG tablet   Commonly known as:  COZAAR   Dose:  100 mg   Quantity:  90 tablet        Take 1 tablet (100 mg) by mouth daily   Refills:  3        tamsulosin 0.4 MG capsule   Commonly known as:  FLOMAX   Dose:  0.4 mg   Quantity:  90 capsule        Take 1 capsule (0.4 mg) by mouth daily   Refills:  3      "   testosterone cypionate 200 MG/ML injection   Commonly known as:  DEPOTESTOTERONE   Dose:  200 mg   Quantity:  1 mL        Inject 1 mL (200 mg) into the muscle every 14 days To be given in clinic   Refills:  5        UNABLE TO FIND   Dose:  5 tablet        Take 5 tablets by mouth 2 times daily \"Isogenic vitamins\"   Refills:  0                Procedures and tests performed during your visit     Basic metabolic panel    CBC with platelets differential    Cardiac Continuous Monitoring    EKG 12-lead, tracing only    Peripheral IV catheter    Shoulder XR, 2 view left    Troponin I    XR Ribs & Chest Lt 3v      Orders Needing Specimen Collection     None      Pending Results     No orders found from 10/15/2017 to 10/18/2017.            Pending Culture Results     No orders found from 10/15/2017 to 10/18/2017.            Pending Results Instructions     If you had any lab results that were not finalized at the time of your Discharge, you can call the ED Lab Result RN at [phone]. You will be contacted by this team for any positive Lab results or changes in treatment. The nurses are available 7 days a week from 10A to 6:30P.  You can leave a message 24 hours per day and they will return your call.        Test Results From Your Hospital Stay        10/17/2017 11:59 AM      Component Results     Component Value Ref Range & Units Status    WBC 7.2 4.0 - 11.0 10e9/L Final    RBC Count 5.33 4.4 - 5.9 10e12/L Final    Hemoglobin 15.5 13.3 - 17.7 g/dL Final    Hematocrit 44.6 40.0 - 53.0 % Final    MCV 84 78 - 100 fl Final    MCH 29.1 26.5 - 33.0 pg Final    MCHC 34.8 31.5 - 36.5 g/dL Final    RDW 13.4 10.0 - 15.0 % Final    Platelet Count 236 150 - 450 10e9/L Final    Diff Method Automated Method  Final    % Neutrophils 75.1 % Final    % Lymphocytes 16.1 % Final    % Monocytes 6.8 % Final    % Eosinophils 1.5 % Final    % Basophils 0.4 % Final    % Immature Granulocytes 0.1 % Final    Absolute Neutrophil 5.4 1.6 - 8.3 " 10e9/L Final    Absolute Lymphocytes 1.2 0.8 - 5.3 10e9/L Final    Absolute Monocytes 0.5 0.0 - 1.3 10e9/L Final    Absolute Eosinophils 0.1 0.0 - 0.7 10e9/L Final    Absolute Basophils 0.0 0.0 - 0.2 10e9/L Final    Abs Immature Granulocytes 0.0 0 - 0.4 10e9/L Final         10/17/2017 12:23 PM      Component Results     Component Value Ref Range & Units Status    Sodium 137 133 - 144 mmol/L Final    Potassium 3.4 3.4 - 5.3 mmol/L Final    Chloride 102 94 - 109 mmol/L Final    Carbon Dioxide 31 20 - 32 mmol/L Final    Anion Gap 4 3 - 14 mmol/L Final    Glucose 92 70 - 99 mg/dL Final    Urea Nitrogen 24 7 - 30 mg/dL Final    Creatinine 1.02 0.66 - 1.25 mg/dL Final    GFR Estimate 76 >60 mL/min/1.7m2 Final    Non  GFR Calc    GFR Estimate If Black >90 >60 mL/min/1.7m2 Final    African American GFR Calc    Calcium 9.1 8.5 - 10.1 mg/dL Final         10/17/2017 12:23 PM      Component Results     Component Value Ref Range & Units Status    Troponin I ES <0.015 0.000 - 0.045 ug/L Final    The 99th percentile for upper reference range is 0.045 ug/L.  Troponin values   in the range of 0.045 - 0.120 ug/L may be associated with risks of adverse   clinical events.           10/17/2017 12:42 PM      Narrative     XR RIBS & CHEST LT 3VW 10/17/2017 12:25 PM    HISTORY: Pain.    COMPARISON: 7/9/2016.        Impression     IMPRESSION: A single view of the chest shows no acute cardiopulmonary  disease and no significant change. Two views of the left ribs show no  fracture.     TAMRA DEL RIO MD         10/17/2017 12:43 PM      Narrative     XR SHOULDER 2 VIEW LEFT 10/17/2017 12:24 PM    HISTORY: Pain.    COMPARISON: None.        Impression     IMPRESSION: 2 views of the left shoulder show no fracture or  dislocation. Degenerative bony spurring at the articular margin of the  humeral head is noted and some degenerative change is also seen in the  AC joint.     TAMRA DEL RIO MD                Thank you for choosing  "[facility]       Thank you for choosing [facility] for your care. Our goal is always to provide you with excellent care. Hearing back from our patients is one way we can continue to improve our services. Please take a few minutes to complete the written survey that you may receive in the mail after you visit with us. Thank you!        TerareconharAskem Information     [employer] lets you send messages to your doctor, view your test results, renew your prescriptions, schedule appointments and more. To sign up, go to [email or web address] . Click on \"Log in\" on the left side of the screen, which will take you to the Welcome page. Then click on \"Sign up Now\" on the right side of the page.     You will be asked to enter the access code listed below, as well as some personal information. Please follow the directions to create your username and password.     Your access code is: [email or web address]  Expires: 1/15/2018 12:57 PM     Your access code will  in 90 days. If you need help or a new code, please call your [facility] clinic or [phone].        Care EveryWhere ID     This is your Care EveryWhere ID. This could be used by other organizations to access your [facility] medical records  [id number]        Equal Access to Services     EDDIE PASCUAL : Mickey petito Soelizabeth, waaxda luqadaha, qaybta kaalmada ademagyada, samantha bhatti. So [facility] [phone].    ATENCIÓN: Si habla español, tiene a barrett disposición servicios gratuitos de asistencia lingüística. Llame al [phone].    We comply with applicable federal civil rights laws and [place] laws. We do not discriminate on the basis of race, color, national origin, age, disability, sex, sexual orientation, or gender identity.            After Visit Summary       This is your record. Keep this with you and show to your community pharmacist(s) and doctor(s) at your next visit.                  "

## 2017-10-17 NOTE — ED PROVIDER NOTES
History     Chief Complaint   Patient presents with     Chest Pain     since last pm     HPI  Foreign Pond is a 57 year old male who presents with complaints of left axillary pain since last evening.  No specific injury but patient thinks he may have strained his shoulder.  Patient states that he does vigorous physical activity and works out daily.  This morning he climbed 40 feet 5 times without worsening pain, shortness of breath, nausea or vomiting.  Patient does have cardiac risk factors including high cholesterol and hypertension.  His father did have cardiac disease but patient thinks this was later in life.  Patient denies tobacco use or diabetes.  He's not been ill recently.  No fever or chills.  He is right-hand dominant.  It is worse with movement of his shoulder.  He questions if he has a rotator cuff problem is he describes a burning sensation in the arm and axilla.  No skin rash or history of shingles.  Denies any focal motor weakness of the extremities and no radicular arm pain or paresthesias.  Patient did take his daily aspirin this morning.  Otherwise no treatment for his pain.  Patient did have previous stress testing in 2004 which she described as negative.  No personal or family history of DVT or PE.  He denies any leg pain or swelling.  Patient states that over the last year he lost 141 pounds due to diet and exercise.    I have reviewed the Medications, Allergies, Past Medical and Surgical History, and Social History in the Epic system.         Review of Systems all other systems reviewed and are negative.  Past Medical History:   Diagnosis Date     Asthma      BPH (benign prostatic hyperplasia)      Hypercholesteremia      Hypertension      Mild intermittent asthma 8/15/2006    Patient states he does not have asthma.  Doesn't use albuterol ever.        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      Current Outpatient Prescriptions   Medication     tamsulosin (FLOMAX) 0.4 MG capsule     losartan (COZAAR) 100 MG tablet     hydrochlorothiazide (HYDRODIURIL) 25 MG tablet     UNABLE TO FIND     aspirin 81 MG tablet     testosterone cypionate (DEPOTESTOTERONE) 200 MG/ML injection        Allergies   Allergen Reactions     Lisinopril Cough     Social History     Social History     Marital status:      Spouse name: N/A     Number of children: N/A     Years of education: N/A     Occupational History     Not on file.     Social History Main Topics     Smoking status: Never Smoker     Smokeless tobacco: Never Used     Alcohol use No     Drug use: No     Sexual activity: Yes     Partners: Female     Other Topics Concern     Parent/Sibling W/ Cabg, Mi Or Angioplasty Before 65f 55m? Yes     father MI - unknown age      Social History Narrative     Family History   Problem Relation Age of Onset     Hypertension Father      C.A.D. Father      Thyroid Disease Father      Respiratory Mother      asthma     CANCER Mother      Alcohol/Drug Maternal Grandfather      CEREBROVASCULAR DISEASE Paternal Grandfather      Alcohol/Drug Paternal Grandfather      Hypertension Brother      HEART DISEASE Brother            Physical Exam   BP: 136/80  Heart Rate: 75  Temp: 98.2  F (36.8  C)  Resp: 8  SpO2: 97 %       Physical Exam Gen. alert cooperative male in mild distress.  HEENT shows no facial asymmetry or swelling.  Neck is supple without limitation, thyromegaly or bruits.  Lungs are clear without adventitious sounds.  Cardiac regular rate without murmur.  Back reveals no CVA or spinal tenderness.  Anterior chest wall is nontender but patient has discomfort in the axilla.  There  is no masses or crepitus noted.  No lymphadenopathy.  On palpation of the shoulder he has mild discomfort in the anterior shoulder.  With range of motion he has mild impingement symptoms.  There is no joint effusion.  CMS is intact distally.  The affected shoulder and axilla there is no rashes which would suggest shingles.  Patient has a obese but benign abdominal exam.  His extremities reveal no edema, calf or thigh tenderness, and Homans was negative.    ED Course     ED Course     Procedures               EKG Interpretation:      Interpreted by Garth Herrera  Time reviewed: 11:40  Symptoms at time of EKG: Left axilla and shoulder pain   Rhythm: normal sinus   Rate: Normal  Axis: Normal  Ectopy: none  Conduction: right bundle branch block (incomplete)  ST Segments/ T Waves: Non-specific ST-T wave changes  Q Waves: none  Comparison to prior: Unchanged from 4/14    Clinical Impression: Incomplete right bundle-branch block    Results for orders placed or performed during the hospital encounter of 10/17/17   XR Ribs & Chest Lt 3v    Narrative    XR RIBS & CHEST LT 3VW 10/17/2017 12:25 PM    HISTORY: Pain.    COMPARISON: 7/9/2016.      Impression    IMPRESSION: A single view of the chest shows no acute cardiopulmonary  disease and no significant change. Two views of the left ribs show no  fracture.     TAMRA DEL RIO MD   Shoulder XR, 2 view left    Narrative    XR SHOULDER 2 VIEW LEFT 10/17/2017 12:24 PM    HISTORY: Pain.    COMPARISON: None.      Impression    IMPRESSION: 2 views of the left shoulder show no fracture or  dislocation. Degenerative bony spurring at the articular margin of the  humeral head is noted and some degenerative change is also seen in the  AC joint.     TAMRA DEL RIO MD                   Critical Care time:  none             Labs Ordered and Resulted from Time of ED Arrival Up to the Time of Departure from the ED   CBC WITH PLATELETS DIFFERENTIAL   BASIC METABOLIC PANEL   TROPONIN I   PERIPHERAL  IV CATHETER   CARDIAC CONTINUOUS MONITORING       Assessments & Plan (with Medical Decision Making)   Patient is a 57-year-old male presents with left shoulder and axilla pain since last evening.  No associated shortness of breath, cough, fever, chills, abdominal pain, nausea or vomiting.  No specific injury but patient states he works out vigorously every day.  He denies personal cardiac history but his father did have cardiac disease later in life.  Patient does have high cholesterol and hypertension but denies diabetes or  history of smoking.  Patient did have a normal stress test in 2004.  He states he worked out this morning and did not experience worsening symptoms, chest pressures breath.  He has lost 141 pounds over the last year.  He has no personal or family history of DVT or PE.  He denies any leg pain or swelling.  He is right-hand dominant.  Denies any headache or neck pain.  He has no motor weakness or sensory changes in the left arm.  On exam patient was afebrile vital signs are stable.  He is not hypoxic.  Head and neck were unremarkable.  Lungs were clear without adventitious sounds.  Normal cardiac auscultation.  On palpation of the anterior cuff of the shoulder he did have discomfort but no joint effusion.  With range of motion at extremes for abduction and internal/external rotation did have mild discomfort.  No drop with lateral motion against resistance.  Also had mild discomfort in the axilla without any mass, crepitus or step-off.  No lymphadenopathy was noted.  No skin rash or history of shingles.  EKG shows incomplete right bundle branch but this is unchanged from one done in 2014.  Patient's blood work including troponin was normal.  Suspect the patient has mild inflammation of his shoulder or rotator cuff.  At this point she does not want therapy or further treatment.  He'll try cold remedies.  He is given a referral to Dr. Oscar Sosa if he has persistent or worsening symptoms.  He is  advised to limit his weightlifting as this could exacerbate the problem.  Handout on rotator cuff issues is provided.  Reasons to return for reassessment were discussed.  We did do x-ray imaging of his chest and shoulder.  The chest and ribs were unremarkable.  He did have degenerative changes noted of the before meals and humeral head.  I have reviewed the nursing notes.    I have reviewed the findings, diagnosis, plan and need for follow up with the patient.       New Prescriptions    No medications on file       Final diagnoses:   Acute pain of left shoulder   Left axillary pain       10/17/2017   [facility] EMERGENCY DEPARTMENT     Garth Herrera MD  10/17/17 [id number]

## 2017-11-10 ENCOUNTER — OFFICE VISIT (OUTPATIENT)
Dept: FAMILY MEDICINE | Facility: CLINIC | Age: 57
End: 2017-11-10

## 2017-11-10 DIAGNOSIS — Z53.9 ERRONEOUS ENCOUNTER--DISREGARD: Primary | ICD-10-CM

## 2017-11-10 NOTE — MR AVS SNAPSHOT
"              After Visit Summary   11/10/2017    Foreign Pond    MRN: [id number]           Patient Information     Date Of Birth          1960        Visit Information        Provider Department      11/10/2017 10:00 AM Kevin Harris MD [facility]        Today's Diagnoses     ERRONEOUS ENCOUNTER--DISREGARD    -  1       Follow-ups after your visit        Who to contact     If you have questions or need follow up information about today's clinic visit or your schedule please contact [facility] directly at [phone].  Normal or non-critical lab and imaging results will be communicated to you by mydalahart, letter or phone within 4 business days after the clinic has received the results. If you do not hear from us within 7 days, please contact the clinic through mydalahart or phone. If you have a critical or abnormal lab result, we will notify you by phone as soon as possible.  Submit refill requests through [employer] or call your pharmacy and they will forward the refill request to us. Please allow 3 business days for your refill to be completed.          Additional Information About Your Visit        MyChart Information     [employer] lets you send messages to your doctor, view your test results, renew your prescriptions, schedule appointments and more. To sign up, go to [email or web address] . Click on \"Log in\" on the left side of the screen, which will take you to the Welcome page. Then click on \"Sign up Now\" on the right side of the page.     You will be asked to enter the access code listed below, as well as some personal information. Please follow the directions to create your username and password.     Your access code is: [email or web address]  Expires: 1/15/2018 11:57 AM     Your access code will  in 90 days. If you need help or a new code, please call your [facility] or [phone].        Care EveryWhere ID     This is your Care EveryWhere ID. This could be used by " other organizations to access your [place] medical records  [id number]         Blood Pressure from Last 3 Encounters:   10/17/17 111/79   05/12/17 130/88   12/12/16 113/73    Weight from Last 3 Encounters:   05/12/17 281 lb 14.4 oz (127.9 kg)   12/12/16 288 lb (130.6 kg)   10/28/16 (!) 306 lb (138.8 kg)              Today, you had the following     No orders found for display       Primary Care Provider Office Phone # Fax #    Joleen Chappell Rudolphsherry, APRN [phone] [address] [facility] [address]        Equal Access to Services     EDDIE PASCUAL : Hadii charleen petito Soelizabeth, waaxda luqadaha, qaybta kaalmada adeegyada, samantha hernandez . So [facility] [phone].    ATENCIÓN: Si habla español, tiene a barrett disposición servicios gratuitos de asistencia lingüística. Llame al [phone].    We comply with applicable federal civil rights laws and [place] laws. We do not discriminate on the basis of race, color, national origin, age, disability, sex, sexual orientation, or gender identity.            Thank you!     Thank you for choosing [facility]  for your care. Our goal is always to provide you with excellent care. Hearing back from our patients is one way we can continue to improve our services. Please take a few minutes to complete the written survey that you may receive in the mail after your visit with us. Thank you!             Your Updated Medication List - Protect others around you: Learn how to safely use, store and throw away your medicines at www.disposemymeds.org.          This list is accurate as of: 11/10/17 11:59 PM.  Always use your most recent med list.                   Brand Name Dispense Instructions for use Diagnosis    aspirin 81 MG tablet     1 tablet    Take 1 tablet by mouth daily.    HTN (hypertension)       hydrochlorothiazide 25 MG tablet    HYDRODIURIL    90 tablet    Take 1 tablet (25 mg) by mouth daily    Benign  "essential hypertension       losartan 100 MG tablet    COZAAR    90 tablet    Take 1 tablet (100 mg) by mouth daily    Benign essential hypertension       tamsulosin 0.4 MG capsule    FLOMAX    90 capsule    Take 1 capsule (0.4 mg) by mouth daily    Urinary frequency       testosterone cypionate 200 MG/ML injection    DEPOTESTOTERONE    1 mL    Inject 1 mL (200 mg) into the muscle every 14 days To be given in clinic    Hypogonadism male       UNABLE TO FIND      Take 5 tablets by mouth 2 times daily \"Isogenic vitamins\"          "

## 2017-12-26 ENCOUNTER — OFFICE VISIT (OUTPATIENT)
Dept: FAMILY MEDICINE | Facility: CLINIC | Age: 57
End: 2017-12-26
Payer: COMMERCIAL

## 2017-12-26 VITALS
HEART RATE: 76 BPM | DIASTOLIC BLOOD PRESSURE: 78 MMHG | HEIGHT: 72 IN | WEIGHT: 281 LBS | BODY MASS INDEX: 38.06 KG/M2 | RESPIRATION RATE: 16 BRPM | TEMPERATURE: 98.6 F | SYSTOLIC BLOOD PRESSURE: 133 MMHG | OXYGEN SATURATION: 97 %

## 2017-12-26 DIAGNOSIS — Z30.9 ENCOUNTER FOR CONTRACEPTIVE MANAGEMENT, UNSPECIFIED TYPE: Primary | ICD-10-CM

## 2017-12-26 PROCEDURE — 99213 OFFICE O/P EST LOW 20 MIN: CPT | Performed by: FAMILY MEDICINE

## 2017-12-26 NOTE — NURSING NOTE
"Chief Complaint   Patient presents with     Consult     vasectomy consultation       Initial /78  Pulse 76  Temp 98.6  F (37  C) (Tympanic)  Resp 16  Ht 5' 11.5\" (1.816 m)  Wt 281 lb (127.5 kg)  SpO2 97%  BMI 38.65 kg/m2 Estimated body mass index is 38.65 kg/(m^2) as calculated from the following:    Height as of this encounter: 5' 11.5\" (1.816 m).    Weight as of this encounter: 281 lb (127.5 kg).  Medication Reconciliation: complete  "

## 2017-12-26 NOTE — PATIENT INSTRUCTIONS
Thank you for choosing [facility].  You may be receiving a survey in the mail from Evelyn Verduzco regarding your visit today.  Please take a few minutes to complete and return the survey to let us know how we are doing.      If you have questions or concerns, please contact us via [email or web address] or you can contact your care team at [phone].    Our Clinic hours are:  Monday 6:40 am  to 7:00 pm  Tuesday -Friday 6:40 am to 5:00 pm    The [place] outpatient lab hours are:  Monday - Friday 6:10 am to 4:45 pm  Saturdays 7:00 am to 11:00 am  Appointments are required, call [phone]    If you have clinical questions after hours or would like to schedule an appointment,  call the clinic at [phone].    ASSESSMENT:  Undesired fertility in an adult male, requesting vasectomy.    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 We will do the referral to Urology in [place].   He will call to make this appt. Also use your insurance benefits number and our referrals dept at [phone].

## 2017-12-26 NOTE — PROGRESS NOTES
SUBJECTIVE:   Foreign Pond is a 57 year old male who presents to clinic today for the following health issues:      Chief Complaint   Patient presents with     Consult     vasectomy consultation       SUBJECTIVE:  This 57 year old male is in for a vasectomy consultation.  He and his partner have decided they don't want to have any more children. They have decided that he will have the sterilization procedure instead of her.  Patient was given information to read prior to the consultation.      No surgery on the groin, no bleeding disorders. No allergies to iodine or Lidocaine. He is single with 3 healthy children.     We talked about the risks and benefits of the vasectomy; risks being that of potential for bleeding, infection, postoperative discomfort immediately which can last for a number of weeks afterwards, also the development of spermatoceles or sperm granulomas, epididymal cysts and the possibility of failure of the procedure producing failed attempt at sterility.     Also mentioned to the patient that there is some literature out there that suggests men who have vasectomies are at increased risk for prostate cancer; however, this literature is inconclusive and controversial.  It is recommended that men who have vasectomies should have annual prostate exams through the rectum yearly after age 40 and also may benefit from a screening prostatic specific antigen test after age 40.  We also discussed signs and symptoms of prostatic enlargement or prostatic problems.     I went through the procedure with the patient, how it is done, a midline incision in the scrotum measuring approximately 1/2 inch in length.  The vas deferens is brought up through this incision, dissected free and a small portion, maybe 0.5 cm. in length is removed.  Each end is tied with an absorbable suture, cauterized and then the proximal or distal end is buried away from the other.  Patient had no questions when it came to the procedure  "itself.  I did show him pictures and diagrams of the procedure.  I showed him where a potential spermatocele or sperm granuloma can occur.      Again, the patient had no further questions for me.    OBJECTIVE:Blood pressure 133/78, pulse 76, temperature 98.6  F (37  C), temperature source Tympanic, resp. rate 16, height 5' 11.5\" (1.816 m), weight 281 lb (127.5 kg), SpO2 97 %.      On exam, this is a well-developed, well-nourished white male.    Exam reveals a normal circumcised penis, no lesions.  Testes are descended bilaterally.  Exam was limited to the genitalia.  Both vas deferens were easily identified.  No other abnormalities were noted.      ASSESSMENT:  Undesired fertility in an adult male, requesting vasectomy.    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 We will do the referral to Urology in [place].   He will call to make this appt. Also use your insurance benefits number and our referrals dept at [phone].     Kevin Harris              "

## 2017-12-26 NOTE — MR AVS SNAPSHOT
After Visit Summary   12/26/2017    [place]    MRN: [id number]           Patient Information     Date Of Birth          1960        Visit Information        Provider Department      12/26/2017 1:40 PM Kevin Harris MD [facility]        Today's Diagnoses     Encounter for contraceptive management, unspecified type    -  1      Care Instructions          Thank you for choosing [facility].  You may be receiving a survey in the mail from [facility] regarding your visit today.  Please take a few minutes to complete and return the survey to let us know how we are doing.      If you have questions or concerns, please contact us via iStreamPlanet or you can contact your care team at [phone].    Our Clinic hours are:  Monday 6:40 am  to 7:00 pm  Tuesday -Friday 6:40 am to 5:00 pm    The [place] outpatient lab hours are:  Monday - Friday 6:10 am to 4:45 pm  Saturdays 7:00 am to 11:00 am  Appointments are required, call [phone]    If you have clinical questions after hours or would like to schedule an appointment,  call the clinic at [phone].    ASSESSMENT:  Undesired fertility in an adult male, requesting vasectomy.    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 We will do the referral to Urology in [place].   He will call to make this appt. Also use your insurance benefits number and our referrals dept at [phone].           Follow-ups after your visit        Additional Services     UROLOGY ADULT REFERRAL       Your provider has referred you to: [facility]: [facility] [phone]   [email or web address]-[facility]    Please be aware that coverage of these services is subject to the terms and limitations of your health insurance plan.  Call member services at your health plan  "with any benefit or coverage questions.      Please bring the following with you to your appointment:    (1) Any X-Rays, CTs or MRIs which have been performed.  Contact the facility where they were done to arrange for  prior to your scheduled appointment.    (2) List of current medications  (3) This referral request   (4) Any documents/labs given to you for this referral                  Your next 10 appointments already scheduled     Jan 04, 2018 10:40 AM CST   [facility] Visit [facility] MD   [facility]    [address] [address] [phone]           Bring a current list of meds and any records pertaining to this visit. For Physicals, please bring immunization records and any forms needing to be filled out. Please arrive 10 minutes early to complete paperwork.              Who to contact     If you have questions or need follow up information about today's clinic visit or your schedule please contact [facility] directly at [phone].  Normal or non-critical lab and imaging results will be communicated to you by MyChart, letter or phone within 4 business days after the clinic has received the results. If you do not hear from us within 7 days, please contact the clinic through CAILabshart or phone. If you have a critical or abnormal lab result, we will notify you by phone as soon as possible.  Submit refill requests through SnapShop or call your pharmacy and they will forward the refill request to us. Please allow 3 business days for your refill to be completed.          Additional Information About Your Visit        SnapShop Information     SnapShop lets you send messages to your doctor, view your test results, renew your prescriptions, schedule appointments and more. To sign up, go to [email or web address] . Click on \"Log in\" on the left side of the screen, which will take you to the Welcome page. Then click on \"Sign " "up Now\" on the right side of the page.     You will be asked to enter the access code listed below, as well as some personal information. Please follow the directions to create your username and password.     Your access code is: [email or web address]  Expires: 1/15/2018 11:57 AM     Your access code will  in 90 days. If you need help or a new code, please call your [place] clinic or [phone].        Care EveryWhere ID     This is your Care EveryWhere ID. This could be used by other organizations to access your [place] medical records  [id number]        Your Vitals Were     Pulse Temperature Respirations Height Pulse Oximetry BMI (Body Mass Index)    76 98.6  F (37  C) (Tympanic) 16 5' 11.5\" (1.816 m) 97% 38.65 kg/m2       Blood Pressure from Last 3 Encounters:   17 133/78   10/17/17 111/79   17 130/88    Weight from Last 3 Encounters:   17 281 lb (127.5 kg)   17 281 lb 14.4 oz (127.9 kg)   16 288 lb (130.6 kg)              We Performed the Following     UROLOGY ADULT REFERRAL        Primary Care Provider Office Phone # Fax #    Joleen RAVEN [facility] [phone] [address] [address]        Equal Access to Services     EDDIE PASCUAL : Hadii aad ku hadasho Soomaali, waaxda luqadaha, qaybta kaalmada adeegyada, waxay kell hernandez . So [facility] [phone].    ATENCIÓN: Si habla español, tiene a barrett disposición servicios gratuitos de asistencia lingüística. Dorothy al [phone].    We comply with applicable federal civil rights laws and [place] laws. We do not discriminate on the basis of race, color, national origin, age, disability, sex, sexual orientation, or gender identity.            Thank you!     Thank you for choosing [facility]  for your care. Our goal is always to provide you with excellent care. Hearing back from our patients is one way we can continue to improve our services. Please take a " "few minutes to complete the written survey that you may receive in the mail after your visit with us. Thank you!             Your Updated Medication List - Protect others around you: Learn how to safely use, store and throw away your medicines at www.disposemymeds.org.          This list is accurate as of: 12/26/17  2:30 PM.  Always use your most recent med list.                   Brand Name Dispense Instructions for use Diagnosis    aspirin 81 MG tablet     1 tablet    Take 1 tablet by mouth daily.    HTN (hypertension)       hydrochlorothiazide 25 MG tablet    HYDRODIURIL    90 tablet    Take 1 tablet (25 mg) by mouth daily    Benign essential hypertension       losartan 100 MG tablet    COZAAR    90 tablet    Take 1 tablet (100 mg) by mouth daily    Benign essential hypertension       tamsulosin 0.4 MG capsule    FLOMAX    90 capsule    Take 1 capsule (0.4 mg) by mouth daily    Urinary frequency       testosterone cypionate 200 MG/ML injection    DEPOTESTOTERONE    1 mL    Inject 1 mL (200 mg) into the muscle every 14 days To be given in clinic    Hypogonadism male       UNABLE TO FIND      Take 5 tablets by mouth 2 times daily \"Isogenic vitamins\"          "

## 2018-01-26 ENCOUNTER — PRE VISIT (OUTPATIENT)
Dept: UROLOGY | Facility: CLINIC | Age: 58
End: 2018-01-26

## 2018-01-26 NOTE — TELEPHONE ENCOUNTER
"    [facility] CLINICAL DOCUMENTATION    Pre-Visit Planning   PREVISIT INFORMATION                                                    [place] scheduled for future visit at [facility] specialty clinics.    Patient is scheduled to see Karlos (provider) on 1/29/18 (date)  Reason for visit: contraception  Referring provider Steven  Has patient seen previous specialist? ?  Medical Records:  Available in chart.  Patient was previously seen at a [place] or [facility] facility.    REVIEW                                                      New patient packet mailed to patient: No  Medication reconciliation complete: No      Current Outpatient Prescriptions   Medication Sig Dispense Refill     tamsulosin (FLOMAX) 0.4 MG capsule Take 1 capsule (0.4 mg) by mouth daily 90 capsule 3     losartan (COZAAR) 100 MG tablet Take 1 tablet (100 mg) by mouth daily 90 tablet 3     hydrochlorothiazide (HYDRODIURIL) 25 MG tablet Take 1 tablet (25 mg) by mouth daily 90 tablet 3     testosterone cypionate (DEPOTESTOTERONE) 200 MG/ML injection Inject 1 mL (200 mg) into the muscle every 14 days To be given in clinic (Patient not taking: Reported on 12/26/2017) 1 mL 5     UNABLE TO FIND Take 5 tablets by mouth 2 times daily \"Isogenic vitamins\"       aspirin 81 MG tablet Take 1 tablet by mouth daily. 1 tablet 3       Allergies: Lisinopril    (insert provider dot-phrase for provider specific visit requirements)    PLAN/FOLLOW-UP NEEDED                                                      The following is needed before upcoming appointment. Med rec    Patient Reminders Given:  Please, make sure you bring an updated list of your medications.   If you are having a procedure, please, present 15 minutes early.  If you need to cancel or reschedule,please call [phone].    Left message for patient to call back if there were any records that we needed to get before his appt.    Ester Hendrix LPN          "

## 2018-01-29 ENCOUNTER — OFFICE VISIT (OUTPATIENT)
Dept: UROLOGY | Facility: CLINIC | Age: 58
End: 2018-01-29
Attending: FAMILY MEDICINE
Payer: COMMERCIAL

## 2018-01-29 ENCOUNTER — TELEPHONE (OUTPATIENT)
Dept: UROLOGY | Facility: CLINIC | Age: 58
End: 2018-01-29

## 2018-01-29 VITALS
BODY MASS INDEX: 39.78 KG/M2 | DIASTOLIC BLOOD PRESSURE: 81 MMHG | SYSTOLIC BLOOD PRESSURE: 128 MMHG | HEIGHT: 70 IN | HEART RATE: 73 BPM | WEIGHT: 277.9 LBS

## 2018-01-29 DIAGNOSIS — Z30.2 ENCOUNTER FOR VASECTOMY: Primary | ICD-10-CM

## 2018-01-29 PROCEDURE — 99203 OFFICE O/P NEW LOW 30 MIN: CPT | Performed by: UROLOGY

## 2018-01-29 NOTE — TELEPHONE ENCOUNTER
Procedure: bilateral vasectomy  Facility: [facility]  Length: 80 minute(s)  Surgeon: Kavon Everett MD  Anesthesia: MAC  BMI: There is no height or weight on file to calculate BMI. (If over 43 for general or 45 for MAC cannot be scheduled at  ASC)   Pre-op Appointments needed: yes, with PCP  Post-op appointments needed:2 weeks   provider only   needed:  No  Surgery packet/instructions given to patient?  Yes   Special Considerations: patient requesting 2/21/18 for a surgery date.    Maddy Reddy RN, BSN

## 2018-01-29 NOTE — MR AVS SNAPSHOT
After Visit Summary   1/29/2018    [facility]    MRN: [id number]           Patient Information     Date Of Birth          1960        Visit Information        Provider Department      1/29/2018 9:30 AM Kavon Everett MD [facility]        Today's Diagnoses     Encounter for vasectomy    -  1      Care Instructions    Surgery Instructions    Always follow your surgeon s instructions. If you don t, your surgery could be cancelled. Please use the following checklist.  Your surgery is on: The surgery scheduler will contact you within 1 week of your consult with the surgeon. If you do not hear from them, please call the clinic or RN Care Coordinator for your provider.    Time: Prearrival times can vary depending on location/type of surgery.  [place] - 1 hour pre-arrival    Note:  These times may change. A nurse will call you before surgery to confirm. If you have not received a call or if you have more questions, please call us on the working day before your surgery:  ? [place]: [phone] or [phone] (9am to 5:30pm)  Prior to surgery  ? Have a pre-op physical exam with your Primary Doctor within 30 days of surgery  - Ask your doctor to send all of your results to the surgery center/hospital before surgery. Your doctor also may ask you to bring the results with you on the day of surgery.  - Tell your doctor if:  - You are allergic to latex or rubber (latex and rubber gloves are often used in medical care).  - You are taking any medicines (including aspirin), vitamins, or herbal products. You may need to stop taking some medicines before surgery.  - You have any medical problems (allergies, diabetes, or heart disease, for example).  - You have a pacemaker or an AICD (automatic implanted cardiac defibrillator). If you do, please bring the ID card with you on the day of surgery.  - People who smoke have a higher risk of infection after surgery. Ask your doctor how  you can quit smoking.  - If you Primary Doctor is not within the [employer] system, you will need to have your pre-op physical faxed to us to be scanned into your chart.  - [place]: [phone] or [phone]  ? Call your insurance company. Ask if you need pre-approval for your surgery. If you do not have insurance, please let us know. If you wish to speak to the , please alert the clinic staff so this can be arranged.  ? Arrange for someone to drive you home after surgery.  will need to be a responsible adult (18 years or older) that will provide transportation to and from surgery and stay in the waiting room during your surgery. You may not drive yourself or take public transportation to and from surgery.  ? Arrange for someone to stay with you for 24 hours after you go home. This person must be a responsible adult (18 years or older).  ? Call your surgeon or their nurse if there is any change in your health (cold, flu, infections, hospitalizations).  ? Do not smoke, drink alcohol, or take over-the-counter medicine for 24 hours before and after surgery.  ? If you take prescribed drugs, you may need to stop them until after the surgery.  Discuss what medications to take or not take prior to surgery with your Primary Doctor at your pre-op physical. Avoid over-the-counter blood-thinning medications such as Aspirin, Ibuprofen, vitamin E, or fish oil 7 days prior to surgery (unless otherwise directed by your Primary Doctor). Tylenol is a good alternative for mild pain relief prior to surgery.  ? Eating and drinking guidelines prior to surgery:  - Stop all solid food consumption 8 hours prior to surgery  - You may drink clear liquids up to 2 hours prior to surgery (water, fruit juices without pulp, jello, tea/coffee without creamer, sports drinks, clear-fat free broth (bouillon or consomme), popsicles (without milk, bits of fruit, or seeds/nuts)  ? Follow instructions given for showering or  bathing before surgery.    - Use 8 ounces of antiseptic surgical soap, like:  - Hibiclens, Scrub Care, or Exidine  - You can find it at your local pharmacy, clinic, or retail store. If you have trouble, ask your pharmacist to help you find the right substitute.  - Please wash with one of the above soaps twice before coming to the hospital for your surgery. This will decrease bacteria (germs) on your skin. It will also help reduce your chance of infection after surgery.  - Items you will need for showering:  - 4 newly washed washcloths  - 2 newly washed towels  - 8 ounces of one of the above soaps  - Following these instructions:  - The evening before surgery: Shower or bathe as you normally would, using your regular soap and a clean washcloth. Give special attention to places where your incision (surgical cut) or catheters will be. This includes your groin area. Rinse well. You may wash your hair with your regular shampoo. Next, wash your body with 4 ounces of the antiseptic soap. Use a clean, damp washcloth and gently clean your body (from the chin down). If your surgery involves your head, use the special soap on your head and scalp. Rinse well and dry off using a newly washed towel.  - The morning of surgery: Repeat the same process as the evening shower.  - Other suggestions:    Do not shave within 12 inches of your incision (surgical cut) area for at least 3 days before surgery. Shaving can make small cuts in the skin. This puts you at higher risk of infection.    Wear freshly washed pajamas or clothing after your evening shower.    Wear freshly washed clothes the day of surgery.    Wash and change your bed sheets the day before surgery to have clean bed sheets after your shower and when you get home from surgery.    If you have trouble washing all areas, make sure someone helps you.    Don't use any deodorant, lotion or powder after your shower.    Women who are menstruating should wear a fresh sanitary pad to  the hospital.  ? Do not wear or add deodorant, cologne, lotion, makeup, nail polish or jewelry to surgery. If you wear fake nails, please remove at least one nail before coming to surgery (an oxygen monitor needs to be placed on your finger during surgery).  ? Bring these items to the surgery center/hospital:  - Insurance card  - Money for parking and co-pays, if needed  - A list of all the medicines you take. Include vitamins, minerals, herbs, and over-the-counter drugs.  Note any drug allergies.  - A copy of your advance health care directive, if you have one. This tells us what treatment you would want--and who would make health care decisions--if you could no longer speak for yourself. You may request this form in advance or download it from [email or web address].  - A case for glasses, contact lenses, hearing aids, or dentures.  - Your inhaler or CPAP machine, if you use these at home.  ? Leave extra cash, jewelry, and other valuables at home.  When you arrive  When you get to the surgery center/hospital, you will:  ? Check in. If you are under age 18, you must be with a parent or legal guardian.  ? Sign consent forms, if you haven t already. These forms state that you know the risks and benefits of surgery. When you sign the forms, you give us permission to do the surgery. Do not sign them unless you understand what will happen during and after your surgery. If you have any questions about your surgery, ask to speak with your doctor before you sign the forms. If you don t understand the answers, ask again.  ? Receive a copy of the Patient s Bill of Rights. If you do not receive a copy, please ask for one.  ? Change into hospital clothes. Your belongings will be placed in a bag. We will return them to you after surgery.  ? Meet with the anesthesia provider. He or she will tell you what kind of anesthesia (medicine) will be used to keep you comfortable during surgery.  Remember: it s okay to remind doctors  and nurses to wash their hands before touching you.  In most cases, your surgeon will use a marker to write his or her initials on the surgery site. This ensures that the exact site is operated on.  For safety reasons, we will ask you the same questions many times. For example, we may ask your name and birth date over and over again.  Friends and family can stay with you until it s time for surgery. While you re in surgery, they will be in the waiting area. Please note that cell phones are not allowed in some patient care areas.  If you have questions about what will happen in the operating room, talk to your care team.  After surgery  We will move you to a recovery room, where we will watch you closely. If you have any pain or discomfort, tell your nurse. He or she will try to make you comfortable.  If you are staying overnight, we will move you to your hospital room after you are awake.  If you are going home, we will move you to another room. Friends and family may be able to join you. The length of time you spend in recovery depend on the type of medicine you received, your medical condition, the type of surgery you had, or your response to the anesthesia given during your procedure.  When you are discharged from the recovery room, the nurses will review instructions with you and your caregiver.  ? Please wash your hands every time you touch the wound or change bandages or dressings.  ? Do not submerge the wound in water.  You may not use a bathtub or hot tub until the wound is closed. The wait time frame is generally 2-3 weeks, but any open area can be a source of incoming bacteria, so it is better to be on the safe side and avoid water submersion until your wound is fully healed.  ? You may take a shower 24 hours after surgery. Double check with your surgeon if it is OK for water to run over the wound, whether it has been sutured, stapled, glued, or is open. You may gently wash the wound using the antiseptic  soap provided for your pre-surgery showering (do not use a washcloth). Any mild soap will work as well.  ? Many surgical wounds will have small white strips of tape on them called steri-strips.  Do not remove these. The edges will curl and fall off within 7-10 days with normal showering.  ? If you are going home with sutures (stitches) or staples, you must return to the clinic to have them taken out, usually within 1-2 weeks. Some stitches are dissolvable and do not require removal. Make sure to clarify with your surgeon or surgery nurse reviewing discharge paperwork what kind of sutures you have.  ? Signs and symptoms of infection include:  - Fever, temperature over 101.5   F  - Redness  - Swelling  - Increased pain  - Green or yellow drainage which may or may not have a foul odor  Dealing with pain  A nurse will check your comfort level often during your stay. He or she will work with you to manage your pain.  Remember:  ? All pain is real. There are many ways to control pain. We can help you decide what works best for you.  ? Ask for pain medicine when you need it. Don t try to  tough it out --this can make you feel worse. Always take your medicine as ordered.  ? Medicine doesn t work the same for everyone. If your medicine isn t working, tell your nurse. There may be other medicines or treatments we can try.  Going home  We will let you know when you re ready to leave the surgery center or hospital. Before you leave, we will tell you how to care for yourself at home and prevent infections. If you do not understand something, please say so. We will answer any questions you have. We will then help you get ready to leave.  Remember, you must have a responsible adult (18 years or older) to stay with you 24 hours after you leave the hospital.  Take it easy when you get home. You will need some time to recover--you may be more tired than you realize at first. Rest and relax for at least the first 24 hours at home.  You ll feel better and heal faster if you take good care of yourself.  Follow the discharge instructions that are given to you when you leave the surgery center or hospital  Please call the clinic if you experience any problems during regular clinic hours (Monday-Friday 8:00am-5:00pm).  If you experience problems during non-clinic hours, please call the [facility] on-call line at [phone] and ask the  to page the on-call Provider for your specialty. The on-call Provider will call you back and can triage your symptoms and further advise. If you are having an emergency, always call 911 or seek immediate evaluation at the Emergency Room.  Locations  [facility]  Same-day surgery center - 2nd floor, check-in #1 [address] [place] [address]  [phone]  [email or web address]                Follow-ups after your visit        Who to contact     If you have questions or need follow up information about today's clinic visit or your schedule please contact [facility] directly at [phone].  Normal or non-critical lab and imaging results will be communicated to you by MyChart, letter or phone within 4 business days after the clinic has received the results. If you do not hear from us within 7 days, please contact the clinic through [facility] or phone. If you have a critical or abnormal lab result, we will notify you by phone as soon as possible.  Submit refill requests through [facility] or call your pharmacy and they will forward the refill request to us. Please allow 3 business days for your refill to be completed.          Additional Information About Your Visit        [facility] Information     [facility] is an electronic gateway that provides easy, online access to your medical records. With [facility], you can request a clinic appointment, read your test results, renew a prescription or communicate with your care team.     To sign up for  "MyChart visit the website at [email or web address]   You will be asked to enter the access code listed below, as well as some personal information. Please follow the directions to create your username and password.     Your access code is: [email or web address]  Expires: 2018  9:57 AM     Your access code will  in 90 days. If you need help or a new code, please contact your [facility] or call [phone] for assistance.        Care EveryWhere ID     This is your Care EveryWhere ID. This could be used by other organizations to access your [facility] medical records  [id number]        Your Vitals Were     Height BMI (Body Mass Index)                1.77 m (5' 9.69\") 40.24 kg/m2           Blood Pressure from Last 3 Encounters:   17 133/78   10/17/17 111/79   17 130/88    Weight from Last 3 Encounters:   18 126.1 kg (277 lb 14.4 oz)   17 127.5 kg (281 lb)   17 127.9 kg (281 lb 14.4 oz)              We Performed the Following     [place]-Operative Worksheet  (Urology General)        Primary Care Provider Office Phone # Fax #    Joleen RAVEN [facility] [phone] [address] [address]        Equal Access to Services     [facility] ANKUR : Hadii aad ku hadasho Soomaali, waaxda luqadaha, qaybta kaalmada adeegyada, waxay idiin haychelsea hernandez . So [facility] [phone].    ATENCIÓN: Si habla español, tiene a barrett disposición servicios gratuitos de asistencia lingüística. Llame al [phone].    We comply with applicable federal civil rights laws and [place] laws. We do not discriminate on the basis of race, color, national origin, age, disability, sex, sexual orientation, or gender identity.            Thank you!     Thank you for choosing [facility]  for your care. Our goal is always to provide you with excellent care. Hearing back from our patients is one way we can continue to " "improve our services. Please take a few minutes to complete the written survey that you may receive in the mail after your visit with us. Thank you!             Your Updated Medication List - Protect others around you: Learn how to safely use, store and throw away your medicines at www.disposemymeds.org.          This list is accurate as of 1/29/18  9:58 AM.  Always use your most recent med list.                   Brand Name Dispense Instructions for use Diagnosis    aspirin 81 MG tablet     1 tablet    Take 1 tablet by mouth daily.    HTN (hypertension)       hydrochlorothiazide 25 MG tablet    HYDRODIURIL    90 tablet    Take 1 tablet (25 mg) by mouth daily    Benign essential hypertension       losartan 100 MG tablet    COZAAR    90 tablet    Take 1 tablet (100 mg) by mouth daily    Benign essential hypertension       tamsulosin 0.4 MG capsule    FLOMAX    90 capsule    Take 1 capsule (0.4 mg) by mouth daily    Urinary frequency       testosterone cypionate 200 MG/ML injection    DEPOTESTOTERONE    1 mL    Inject 1 mL (200 mg) into the muscle every 14 days To be given in clinic    Hypogonadism male       UNABLE TO FIND      Take 5 tablets by mouth 2 times daily \"Isogenic vitamins\"          "

## 2018-01-29 NOTE — PROGRESS NOTES
"CC: Desires sterilization, consult for vasectomy from Dr. Kevin Harris     HPI: [place] is a 57 year old male with 3 children, and he is intersted in getting a vasectomy for sterilization. His fiance is 51 but still has periods.     No voiding problems.  No history of  trauma.  No hematuria  Normal sexual function.  No history of bleeding problems.    PMH:   Past Medical History:   Diagnosis Date     Asthma      BPH (benign prostatic hyperplasia)      Hypercholesteremia      Hypertension      Mild intermittent asthma 8/15/2006    Patient states he does not have asthma.  Doesn't use albuterol ever.          FAMILY HX:   Family History   Problem Relation Age of Onset     Hypertension Father      C.A.D. Father      Thyroid Disease Father      Respiratory Mother      asthma     CANCER Mother      Alcohol/Drug Maternal Grandfather      CEREBROVASCULAR DISEASE Paternal Grandfather      Alcohol/Drug Paternal Grandfather      Hypertension Brother      HEART DISEASE Brother       No history of coagulopathies, no  malignancies.     ALLERGIES:      Allergies   Allergen Reactions     Lisinopril Cough       MEDS:   Taking over-the-counter testosterone booster.  Takes bp medications.  Off ASA.    SOCIAL HISTORY:  Social History   Substance Use Topics     Smoking status: Never Smoker     Smokeless tobacco: Never Used     Alcohol use No        REVIEW OF SYMPTOMS:   Denies testicular pain, ED, ejaculatory problems, rashes in the groin, easy bruising or bleeding.   No constitutional, eye, ENT, heart, lung, GI, musculoskeletal, skin, neurologic, psychiatric, endocrine or hematologic complaints.       GENERAL PHYSICAL EXAM:   Ht 1.77 m (5' 9.69\")  Wt 126.1 kg (277 lb 14.4 oz)  BMI 40.24 kg/m2     Constitutional: No acute distress. Well nourished.   PSYCH: Normal mood and affect.   NEURO: Normal gait, no focal deficits.   EYES: Anicteric, EOMI, PERR  CARDIOPULMONARY: Breathing non-labored, pulse regular, no " peripheral edema.   GI: Abdomen soft, non-tender, surgical scars: none noted,  no organomegaly.  MUSCULOSKELETAL: Normal limb proportions, no muscle wasting, no contractures.    SKIN: Normal virilized hair distribution, no lesions, warts or rashes over genitalia, abdomen extremities or face.   HEME/LYMPH: No echymosis, no lymphadenopathy in groin or neck, no lymphedema.     EXAM:  Phallus circumcised, meatus adequate, no plaques palpated.   Testes descended, consistency is normal. No intra-testicular masses.  Epididymes present.  Vas not palpable on either side due to very thick spermatic cord bilaterally.  AN deferred.     I discussed with him at length the risks and benefits of the procedure. He understands that this is a sterilization procedure, and not reversible contraception. He understands that reversals, while possible, are not guaranteed to work and fairly complex. I discussed with him the option of sperm cryopreservation.     I stressed that he continues to be fertile in the post-operative period, and that he should continue using other contraceptive methods, such as a condom, until he obtains a semen analysis and we review the results to confirm success of the procedure and infertility. I also stressed to him that recanalization and pregnancy can occur in about 1 per thousand cases, possibly more even after we clear him with a semen analysis showing no motile sperm. I counseled him on the noé-operative risks of bleeding, infection, pain.  I described to him post-vasectomy pain syndrome that can occur in about 1 to 2% of men undergoing vasectomy.     We also discussed recovery times (typically days if no complications) and post-operative care including use of ice packs, pain medication and wound care.    He really would like the vasectomy.  I told him it would have to be done under anesthesia due to the difficult exam, and he's OK with that.  We'll schedule the vasectomy under [facility].    Reese OLIVER

## 2018-01-29 NOTE — TELEPHONE ENCOUNTER
Confirmed surgery date with the patient and scheduled a 2 week post op appointment.  Dilia Goldsmith, Surgery Scheduling Coordinator

## 2018-01-29 NOTE — PATIENT INSTRUCTIONS
Surgery Instructions    Always follow your surgeon s instructions. If you don t, your surgery could be cancelled. Please use the following checklist.  Your surgery is on: The surgery scheduler will contact you within 1 week of your consult with the surgeon. If you do not hear from them, please call the clinic or RN Care Coordinator for your provider.    Time: Prearrival times can vary depending on location/type of surgery.  [place] - 1 hour pre-arrival    Note:  These times may change. A nurse will call you before surgery to confirm. If you have not received a call or if you have more questions, please call us on the working day before your surgery:  ? [facility]: [phone] or [phone] (9am to 5:30pm)  Prior to surgery  ? Have a pre-op physical exam with your Primary Doctor within 30 days of surgery  - Ask your doctor to send all of your results to the surgery center/hospital before surgery. Your doctor also may ask you to bring the results with you on the day of surgery.  - Tell your doctor if:  - You are allergic to latex or rubber (latex and rubber gloves are often used in medical care).  - You are taking any medicines (including aspirin), vitamins, or herbal products. You may need to stop taking some medicines before surgery.  - You have any medical problems (allergies, diabetes, or heart disease, for example).  - You have a pacemaker or an AICD (automatic implanted cardiac defibrillator). If you do, please bring the ID card with you on the day of surgery.  - People who smoke have a higher risk of infection after surgery. Ask your doctor how you can quit smoking.  - If you Primary Doctor is not within the [facility] system, you will need to have your pre-op physical faxed to us to be scanned into your chart.  - [facility]: [phone] or [phone]  ? Call your insurance company. Ask if you need pre-approval for your surgery. If you do not have insurance, please let us know. If you wish to speak to  the , please alert the clinic staff so this can be arranged.  ? Arrange for someone to drive you home after surgery.  will need to be a responsible adult (18 years or older) that will provide transportation to and from surgery and stay in the waiting room during your surgery. You may not drive yourself or take public transportation to and from surgery.  ? Arrange for someone to stay with you for 24 hours after you go home. This person must be a responsible adult (18 years or older).  ? Call your surgeon or their nurse if there is any change in your health (cold, flu, infections, hospitalizations).  ? Do not smoke, drink alcohol, or take over-the-counter medicine for 24 hours before and after surgery.  ? If you take prescribed drugs, you may need to stop them until after the surgery.  Discuss what medications to take or not take prior to surgery with your Primary Doctor at your pre-op physical. Avoid over-the-counter blood-thinning medications such as Aspirin, Ibuprofen, vitamin E, or fish oil 7 days prior to surgery (unless otherwise directed by your Primary Doctor). Tylenol is a good alternative for mild pain relief prior to surgery.  ? Eating and drinking guidelines prior to surgery:  - Stop all solid food consumption 8 hours prior to surgery  - You may drink clear liquids up to 2 hours prior to surgery (water, fruit juices without pulp, jello, tea/coffee without creamer, sports drinks, clear-fat free broth (bouillon or consomme), popsicles (without milk, bits of fruit, or seeds/nuts)  ? Follow instructions given for showering or bathing before surgery.    - Use 8 ounces of antiseptic surgical soap, like:  - Hibiclens, Scrub Care, or Exidine  - You can find it at your local pharmacy, clinic, or retail store. If you have trouble, ask your pharmacist to help you find the right substitute.  - Please wash with one of the above soaps twice before coming to the hospital for your surgery. This will  decrease bacteria (germs) on your skin. It will also help reduce your chance of infection after surgery.  - Items you will need for showering:  - 4 newly washed washcloths  - 2 newly washed towels  - 8 ounces of one of the above soaps  - Following these instructions:  - The evening before surgery: Shower or bathe as you normally would, using your regular soap and a clean washcloth. Give special attention to places where your incision (surgical cut) or catheters will be. This includes your groin area. Rinse well. You may wash your hair with your regular shampoo. Next, wash your body with 4 ounces of the antiseptic soap. Use a clean, damp washcloth and gently clean your body (from the chin down). If your surgery involves your head, use the special soap on your head and scalp. Rinse well and dry off using a newly washed towel.  - The morning of surgery: Repeat the same process as the evening shower.  - Other suggestions:    Do not shave within 12 inches of your incision (surgical cut) area for at least 3 days before surgery. Shaving can make small cuts in the skin. This puts you at higher risk of infection.    Wear freshly washed pajamas or clothing after your evening shower.    Wear freshly washed clothes the day of surgery.    Wash and change your bed sheets the day before surgery to have clean bed sheets after your shower and when you get home from surgery.    If you have trouble washing all areas, make sure someone helps you.    Don't use any deodorant, lotion or powder after your shower.    Women who are menstruating should wear a fresh sanitary pad to the hospital.  ? Do not wear or add deodorant, cologne, lotion, makeup, nail polish or jewelry to surgery. If you wear fake nails, please remove at least one nail before coming to surgery (an oxygen monitor needs to be placed on your finger during surgery).  ? Bring these items to the surgery center/hospital:  - Insurance card  - Money for parking and co-pays, if  needed  - A list of all the medicines you take. Include vitamins, minerals, herbs, and over-the-counter drugs.  Note any drug allergies.  - A copy of your advance health care directive, if you have one. This tells us what treatment you would want--and who would make health care decisions--if you could no longer speak for yourself. You may request this form in advance or download it from [email or web address].  - A case for glasses, contact lenses, hearing aids, or dentures.  - Your inhaler or CPAP machine, if you use these at home.  ? Leave extra cash, jewelry, and other valuables at home.  When you arrive  When you get to the surgery center/hospital, you will:  ? Check in. If you are under age 18, you must be with a parent or legal guardian.  ? Sign consent forms, if you haven t already. These forms state that you know the risks and benefits of surgery. When you sign the forms, you give us permission to do the surgery. Do not sign them unless you understand what will happen during and after your surgery. If you have any questions about your surgery, ask to speak with your doctor before you sign the forms. If you don t understand the answers, ask again.  ? Receive a copy of the Patient s Bill of Rights. If you do not receive a copy, please ask for one.  ? Change into hospital clothes. Your belongings will be placed in a bag. We will return them to you after surgery.  ? Meet with the anesthesia provider. He or she will tell you what kind of anesthesia (medicine) will be used to keep you comfortable during surgery.  Remember: it s okay to remind doctors and nurses to wash their hands before touching you.  In most cases, your surgeon will use a marker to write his or her initials on the surgery site. This ensures that the exact site is operated on.  For safety reasons, we will ask you the same questions many times. For example, we may ask your name and birth date over and over again.  Friends and family can stay  with you until it s time for surgery. While you re in surgery, they will be in the waiting area. Please note that cell phones are not allowed in some patient care areas.  If you have questions about what will happen in the operating room, talk to your care team.  After surgery  We will move you to a recovery room, where we will watch you closely. If you have any pain or discomfort, tell your nurse. He or she will try to make you comfortable.  If you are staying overnight, we will move you to your hospital room after you are awake.  If you are going home, we will move you to another room. Friends and family may be able to join you. The length of time you spend in recovery depend on the type of medicine you received, your medical condition, the type of surgery you had, or your response to the anesthesia given during your procedure.  When you are discharged from the recovery room, the nurses will review instructions with you and your caregiver.  ? Please wash your hands every time you touch the wound or change bandages or dressings.  ? Do not submerge the wound in water.  You may not use a bathtub or hot tub until the wound is closed. The wait time frame is generally 2-3 weeks, but any open area can be a source of incoming bacteria, so it is better to be on the safe side and avoid water submersion until your wound is fully healed.  ? You may take a shower 24 hours after surgery. Double check with your surgeon if it is OK for water to run over the wound, whether it has been sutured, stapled, glued, or is open. You may gently wash the wound using the antiseptic soap provided for your pre-surgery showering (do not use a washcloth). Any mild soap will work as well.  ? Many surgical wounds will have small white strips of tape on them called steri-strips.  Do not remove these. The edges will curl and fall off within 7-10 days with normal showering.  ? If you are going home with sutures (stitches) or staples, you must return  to the clinic to have them taken out, usually within 1-2 weeks. Some stitches are dissolvable and do not require removal. Make sure to clarify with your surgeon or surgery nurse reviewing discharge paperwork what kind of sutures you have.  ? Signs and symptoms of infection include:  - Fever, temperature over 101.5   F  - Redness  - Swelling  - Increased pain  - Green or yellow drainage which may or may not have a foul odor  Dealing with pain  A nurse will check your comfort level often during your stay. He or she will work with you to manage your pain.  Remember:  ? All pain is real. There are many ways to control pain. We can help you decide what works best for you.  ? Ask for pain medicine when you need it. Don t try to  tough it out --this can make you feel worse. Always take your medicine as ordered.  ? Medicine doesn t work the same for everyone. If your medicine isn t working, tell your nurse. There may be other medicines or treatments we can try.  Going home  We will let you know when you re ready to leave the surgery center or hospital. Before you leave, we will tell you how to care for yourself at home and prevent infections. If you do not understand something, please say so. We will answer any questions you have. We will then help you get ready to leave.  Remember, you must have a responsible adult (18 years or older) to stay with you 24 hours after you leave the hospital.  Take it easy when you get home. You will need some time to recover--you may be more tired than you realize at first. Rest and relax for at least the first 24 hours at home. You ll feel better and heal faster if you take good care of yourself.  Follow the discharge instructions that are given to you when you leave the surgery center or hospital  Please call the clinic if you experience any problems during regular clinic hours (Monday-Friday 8:00am-5:00pm).  If you experience problems during non-clinic hours, please call the [facility]  [place] on-call line at [phone] and ask the  to page the on-call Provider for your specialty. The on-call Provider will call you back and can triage your symptoms and further advise. If you are having an emergency, always call 911 or seek immediate evaluation at the Emergency Room.  Locations  [facility]  Same-day surgery center - 2nd floor, check-in #5  [address] [place] [address]  [phone]  [email or web address]

## 2018-01-29 NOTE — TELEPHONE ENCOUNTER
Left message for the patient to call back to confirm surgery date.    Date Scheduled: 2-21-18 at 12:00pm  Facility: [facility]  Surgeon: Dr. Everett   Post-op appointment scheduled: Pending call back from patient   scheduled?: No  Surgery packet/instructions confirmed received?  Yes  Special Considerations:   Dilia Goldsmith, Surgery Scheduling Coordinator

## 2018-01-29 NOTE — NURSING NOTE
Teaching Topic: Pre-Surgical Information    Person(s) involved in teaching:  Patient     Motivation Level:  Asks Questions: Yes  Eager to Learn: Yes  Cooperative: Yes  Receptive (willing/able to accept information): Yes  Comments:     Surgery Information:  Patient  demonstrates understanding of the following:    Date and time of surgery: Yes patient to be contacted via phone with details of surgery once completed   Location of surgery: [facility]  History and physical and any other testing which must be completed prior to surgery Yes  Required time line for completion of H&P and any other pre-op testing discussed:Yes  NPO status prior to surgery: Yes  Blood thinning medication discussed: Yes discussed when to stop   Diabetes medication management discussed: Yes   Patient to discuss with ordering provider No, N/A    Infection Prevention:  Patient demonstrates understanding of the following:    Surgical procedure site care taught Yes  Signs and symptoms of infection discussed Yes    Instructional Materials given:  Before your surgery packet , Medications to avoid before surgery , Showering or Bathing instructions before surgery  and What to expect after surgery  Pre-scrub soap given: yes  Post-op appointment/testing scheduled per MD orders: to be scheduled once surgery date is known  Imaging needed prior to surgery: No     Maddy Reddy RN, BSN

## 2018-02-19 ENCOUNTER — OFFICE VISIT (OUTPATIENT)
Dept: FAMILY MEDICINE | Facility: CLINIC | Age: 58
End: 2018-02-19
Payer: COMMERCIAL

## 2018-02-19 VITALS
TEMPERATURE: 97.1 F | OXYGEN SATURATION: 99 % | BODY MASS INDEX: 40.23 KG/M2 | SYSTOLIC BLOOD PRESSURE: 138 MMHG | WEIGHT: 281 LBS | HEART RATE: 70 BPM | HEIGHT: 70 IN | DIASTOLIC BLOOD PRESSURE: 88 MMHG

## 2018-02-19 DIAGNOSIS — I10 BENIGN ESSENTIAL HYPERTENSION: ICD-10-CM

## 2018-02-19 DIAGNOSIS — Z30.2 REQUEST FOR STERILIZATION: ICD-10-CM

## 2018-02-19 DIAGNOSIS — Z01.818 PREOP GENERAL PHYSICAL EXAM: Primary | ICD-10-CM

## 2018-02-19 PROCEDURE — 99214 OFFICE O/P EST MOD 30 MIN: CPT | Performed by: NURSE PRACTITIONER

## 2018-02-19 NOTE — MR AVS SNAPSHOT
After Visit Summary   2/19/2018    [place]    MRN: [id number]           Patient Information     Date Of Birth          1960        Visit Information        Provider Department      2/19/2018 9:00 AM Joleen Garcia APRN CNP [facility]        Today's Diagnoses     Preop general physical exam    -  1    Request for sterilization        Benign essential hypertension          Care Instructions      Before Your Surgery      Call your surgeon if there is any change in your health. This includes signs of a cold or flu (such as a sore throat, runny nose, cough, rash or fever).    Do not smoke, drink alcohol or take over the counter medicine (unless your surgeon or primary care doctor tells you to) for the 24 hours before and after surgery.    If you take prescribed drugs: Follow your doctor s orders about which medicines to take and which to stop until after surgery.    Eating and drinking prior to surgery: follow the instructions from your surgeon    Take a shower or bath the night before surgery. Use the soap your surgeon gave you to gently clean your skin. If you do not have soap from your surgeon, use your regular soap. Do not shave or scrub the surgery site.  Wear clean pajamas and have clean sheets on your bed.           Follow-ups after your visit        Your next 10 appointments already scheduled     Feb 21, 2018   [place]   [facility] [place]    [address] [place] [address] [id number] [phone]            Mar 05, 2018  4:00 PM CST   Return Visit with Kavon [facility]rett MD   [facility]    [address] [place] [id number] [phone]              Who to contact     If you have questions or need follow up information about today's clinic visit or your schedule please contact [facility] directly at [phone].  Normal or non-critical  "lab and imaging results will be communicated to you by MyChart, letter or phone within 4 business days after the clinic has received the results. If you do not hear from us within 7 days, please contact the clinic through [employer] or phone. If you have a critical or abnormal lab result, we will notify you by phone as soon as possible.  Submit refill requests through [employer] or call your pharmacy and they will forward the refill request to us. Please allow 3 business days for your refill to be completed.          Additional Information About Your Visit        [employer] Information     [employer] lets you send messages to your doctor, view your test results, renew your prescriptions, schedule appointments and more. To sign up, go to [email or web address] . Click on \"Log in\" on the left side of the screen, which will take you to the Welcome page. Then click on \"Sign up Now\" on the right side of the page.     You will be asked to enter the access code listed below, as well as some personal information. Please follow the directions to create your username and password.     Your access code is: [email or web address]  Expires: 2018  9:57 AM     Your access code will  in 90 days. If you need help or a new code, please call your [facility] clinic or [phone].        Care EveryWhere ID     This is your Care EveryWhere ID. This could be used by other organizations to access your [facility] medical records  [id number]        Your Vitals Were     Pulse Temperature Height Pulse Oximetry BMI (Body Mass Index)       70 97.1  F (36.2  C) (Tympanic) 5' 10\" (1.778 m) 99% 40.32 kg/m2        Blood Pressure from Last 3 Encounters:   18 138/88   18 128/81   17 133/78    Weight from Last 3 Encounters:   18 281 lb (127.5 kg)   18 277 lb 14.4 oz (126.1 kg)   17 281 lb (127.5 kg)              Today, you had the following     No orders found for display       Primary Care Provider Office Phone # Fax #    " "Joleen Garcia, APRN [phone]       [address] [facility] [address]        Equal Access to Services     EDDIE PASCAUL : Hadii aad ku haderumo Soaleshaali, waaxda luqadaha, qaybta kaalmada adeegyada, samantha jamisonriley whitesidemag spaulding mary bhatti. So [facility] [phone].    ATENCIÓN: Si habla español, tiene a barrett disposición servicios gratuitos de asistencia lingüística. Llame al [phone].    We comply with applicable federal civil rights laws and [place] laws. We do not discriminate on the basis of race, color, national origin, age, disability, sex, sexual orientation, or gender identity.            Thank you!     Thank you for choosing [facility]  for your care. Our goal is always to provide you with excellent care. Hearing back from our patients is one way we can continue to improve our services. Please take a few minutes to complete the written survey that you may receive in the mail after your visit with us. Thank you!             Your Updated Medication List - Protect others around you: Learn how to safely use, store and throw away your medicines at www.disposemymeds.org.          This list is accurate as of 2/19/18 10:01 AM.  Always use your most recent med list.                   Brand Name Dispense Instructions for use Diagnosis    aspirin 81 MG tablet     1 tablet    Take 1 tablet by mouth daily.    HTN (hypertension)       hydrochlorothiazide 25 MG tablet    HYDRODIURIL    90 tablet    Take 1 tablet (25 mg) by mouth daily    Benign essential hypertension       losartan 100 MG tablet    COZAAR    90 tablet    Take 1 tablet (100 mg) by mouth daily    Benign essential hypertension       tamsulosin 0.4 MG capsule    FLOMAX    90 capsule    Take 1 capsule (0.4 mg) by mouth daily    Urinary frequency       UNABLE TO FIND      Take 5 tablets by mouth 2 times daily \"Isogenic vitamins\"          "

## 2018-02-19 NOTE — PROGRESS NOTES
[facility]  [address] [address]  [phone]  Dept: [phone]    PRE-OP EVALUATION:  Today's date: 2018    [place] P[place] (: 1960) presents for pre-operative evaluation assessment as requested by Dr. Everett.  He requires evaluation and anesthesia risk assessment prior to undergoing surgery/procedure for treatment of requesting sterilization.  Proposed procedure: vasectomy    Date of Surgery/ Procedure: 2018  Time of Surgery/ Procedure: 12:00pm  Hospital/Surgical Facility:  OR    Primary Physician: Joleen Garcia  Type of Anesthesia Anticipated: MAC    Patient has a Health Care Directive or Living Will:  NO    1. NO - Do you have a history of heart attack, stroke, stent, bypass or surgery on an artery in the head, neck, heart or legs?  2. NO - Do you ever have any pain or discomfort in your chest?  3. NO - Do you have a history of  Heart Failure?  4. NO - Are you troubled by shortness of breath when: walking on the level, up a slight hill or at night?  5. NO - Do you currently have a cold, bronchitis or other respiratory infection?  6. NO - Do you have a cough, shortness of breath or wheezing?  7. NO - Do you sometimes get pains in the calves of your legs when you walk?  8. NO - Do you or anyone in your family have previous history of blood clots?  9. NO - Do you or does anyone in your family have a serious bleeding problem such as prolonged bleeding following surgeries or cuts?  10. NO - Have you ever had problems with anemia or been told to take iron pills?  11. NO - Have you had any abnormal blood loss such as black, tarry or bloody stools, or abnormal vaginal bleeding?  12. NO - Have you ever had a blood transfusion?  13. NO - Have you or any of your relatives ever had problems with anesthesia?  14. NO - Do you have sleep apnea, excessive snoring or daytime drowsiness?  15. NO - Do you have any prosthetic heart valves?  16. NO - Do  you have prosthetic joints?  17. NO - Is there any chance that you may be pregnant?      HPI:                                                      Brief HPI related to upcoming procedure:   Requesting vasectomy      HYPERTENSION - Patient has longstanding history of mod-severe HTN , currently denies any symptoms referable to elevated blood pressure. Specifically denies chest pain, palpitations, dyspnea, orthopnea, PND or peripheral edema. Blood pressure readings have been in normal range. Current medication regimen is as listed below. Patient denies any side effects of medication.        MEDICAL HISTORY:                                                      Patient Active Problem List    Diagnosis Date Noted     Hyperlipidemia with target LDL less than 130 08/01/2014     Priority: Medium     Diagnosis updated by automated process. Provider to review and confirm.       Testicular hypofunction 04/04/2014     Priority: Medium     Problem list name updated by automated process. Provider to review       Urinary frequency 05/18/2011     Priority: Medium     Benign essential hypertension 05/18/2011     Priority: Medium     Generalized anxiety disorder 05/13/2006     Priority: Medium     Currently having intermittent panic attacks.  Chose to discontinue Lexapro as he did not feel it was working and did not want to increase the dose.  He is not wanting medication for this now.       DJD LEFT HIP [715.15] 07/14/2005     Priority: Medium     Morbid obesity (H) 07/14/2005     Priority: Medium      Past Medical History:   Diagnosis Date     Asthma      BPH (benign prostatic hyperplasia)      Hypercholesteremia      Hypertension      Mild intermittent asthma 8/15/2006    Patient states he does not have asthma.  Doesn't use albuterol ever.        Past Surgical History:   Procedure Laterality Date     COLONOSCOPY  7/2006    repeat in 10 years     COLONOSCOPY N/A 12/12/2016    Procedure: COLONOSCOPY;  Surgeon: Delonte Miller MD;   "Location: [facility]     CREATE EARDRUM OPENING,LOCAL ANESTH  age 15     HC ECP WITH CATARACT SURGERY       LAPAROSCOPIC CHOLECYSTECTOMY  11/8/2012    Procedure: LAPAROSCOPIC CHOLECYSTECTOMY;  Laparoscopic Cholecystectomy & repair of umbilical hernia;  Surgeon: Delonte Miller MD;  Location: [facility]     LASIK BILATERAL  2011     Current Outpatient Prescriptions   Medication Sig Dispense Refill     tamsulosin (FLOMAX) 0.4 MG capsule Take 1 capsule (0.4 mg) by mouth daily 90 capsule 3     losartan (COZAAR) 100 MG tablet Take 1 tablet (100 mg) by mouth daily 90 tablet 3     hydrochlorothiazide (HYDRODIURIL) 25 MG tablet Take 1 tablet (25 mg) by mouth daily 90 tablet 3     UNABLE TO FIND Take 5 tablets by mouth 2 times daily \"Isogenic vitamins\"       aspirin 81 MG tablet Take 1 tablet by mouth daily. 1 tablet 3     OTC products: not using Aspirin due to upcoming procedure    Allergies   Allergen Reactions     Lisinopril Cough      Latex Allergy: NO    Social History   Substance Use Topics     Smoking status: Never Smoker     Smokeless tobacco: Never Used     Alcohol use No     History   Drug Use No       REVIEW OF SYSTEMS:                                                    Constitutional, neuro, ENT, endocrine, pulmonary, cardiac, gastrointestinal, genitourinary, musculoskeletal, integument and psychiatric systems are negative, except as otherwise noted.    EXAM:                                                    BP (!) 153/94  Pulse 70  Temp 97.1  F (36.2  C) (Tympanic)  Ht 5' 10\" (1.778 m)  Wt 281 lb (127.5 kg)  SpO2 99%  BMI 40.32 kg/m2  GENERAL APPEARANCE: healthy, alert and no distress  HENT: ear canals and TM's normal and nose and mouth without ulcers or lesions  RESP: lungs clear to auscultation - no rales, rhonchi or wheezes  CV: regular rate and rhythm, normal S1 S2, no S3 or S4 and no murmur, click or rub   ABDOMEN: soft, nontender, no HSM or masses and bowel sounds normal  MS: extremities normal- no gross " deformities noted  NEURO: Normal strength and tone, sensory exam grossly normal, mentation intact and speech normal  PSYCH: mentation appears normal and affect normal/bright    DIAGNOSTICS:                                                    No labs or EKG required for low risk surgery (cataract, skin procedure, breast biopsy, etc)    Recent Labs   Lab Test  10/17/17   1140  05/12/17   0947   05/11/15   1034   HGB  15.5   --    --   15.0   PLT  236   --    --   184   NA  137  141   < >   --    POTASSIUM  3.4  4.1   < >   --    CR  1.02  0.92   < >   --     < > = values in this interval not displayed.      EKG 10/2017: incomplete right bundle branch block.    IMPRESSION:                                                    Reason for surgery/procedure: requesting sterilization  Diagnosis/reason for consult: pre-op evaluation    The proposed surgical procedure is considered LOW risk.    REVISED CARDIAC RISK INDEX  The patient has the following serious cardiovascular risks for perioperative complications such as (MI, PE, VFib and 3  AV Block):  No serious cardiac risks  INTERPRETATION: 0 risks: Class I (very low risk - 0.4% complication rate)    The patient has the following additional risks for perioperative complications:  No identified additional risks      ICD-10-CM    1. Preop general physical exam Z01.818    2. Request for sterilization Z78.9    3. Benign essential hypertension I10        RECOMMENDATIONS:                                                        Cardiovascular Risk  Performs 4 METs exercise without symptoms (Light housework (dusting, washing dishes), Climb a flight of stairs, Walk on level ground at 15 minutes per mile (4 miles/hour) and Bicycling at 8.5 minutes per mile (7 miles/hour)) .       --Patient is to take all scheduled medications on the day of surgery       APPROVAL GIVEN to proceed with proposed procedure, without further diagnostic evaluation       Signed Electronically by: Joleen Geiger  RAVEN Garcia CNP    Copy of this evaluation report is provided to requesting physician.    [facility] Preop Guidelines

## 2018-02-19 NOTE — NURSING NOTE
"Chief Complaint   Patient presents with     Pre-Op Exam       Initial BP (!) 153/94  Pulse 70  Temp 97.1  F (36.2  C) (Tympanic)  Ht 5' 10\" (1.778 m)  Wt 281 lb (127.5 kg)  SpO2 99%  BMI 40.32 kg/m2 Estimated body mass index is 40.32 kg/(m^2) as calculated from the following:    Height as of this encounter: 5' 10\" (1.778 m).    Weight as of this encounter: 281 lb (127.5 kg).  Medication Reconciliation: complete  "

## 2018-02-20 ENCOUNTER — ANESTHESIA EVENT (OUTPATIENT)
Dept: SURGERY | Facility: AMBULATORY SURGERY CENTER | Age: 58
End: 2018-02-20

## 2018-02-21 ENCOUNTER — ANESTHESIA (OUTPATIENT)
Dept: SURGERY | Facility: AMBULATORY SURGERY CENTER | Age: 58
End: 2018-02-21
Payer: COMMERCIAL

## 2018-02-21 ENCOUNTER — SURGERY (OUTPATIENT)
Age: 58
End: 2018-02-21

## 2018-02-21 ENCOUNTER — HOSPITAL ENCOUNTER (OUTPATIENT)
Facility: AMBULATORY SURGERY CENTER | Age: 58
Discharge: HOME | End: 2018-02-21
Attending: UROLOGY | Admitting: UROLOGY
Payer: COMMERCIAL

## 2018-02-21 VITALS
TEMPERATURE: 97.3 F | DIASTOLIC BLOOD PRESSURE: 77 MMHG | BODY MASS INDEX: 37.93 KG/M2 | RESPIRATION RATE: 18 BRPM | SYSTOLIC BLOOD PRESSURE: 121 MMHG | HEIGHT: 72 IN | OXYGEN SATURATION: 96 % | WEIGHT: 280 LBS

## 2018-02-21 DIAGNOSIS — I10 HTN (HYPERTENSION): ICD-10-CM

## 2018-02-21 DIAGNOSIS — G89.18 POST-OP PAIN: Primary | ICD-10-CM

## 2018-02-21 PROCEDURE — 55250 REMOVAL OF SPERM DUCT(S): CPT

## 2018-02-21 PROCEDURE — 55250 REMOVAL OF SPERM DUCT(S): CPT | Performed by: UROLOGY

## 2018-02-21 PROCEDURE — G8916 PT W IV AB GIVEN ON TIME: HCPCS

## 2018-02-21 PROCEDURE — G8907 PT DOC NO EVENTS ON DISCHARG: HCPCS

## 2018-02-21 RX ORDER — FENTANYL CITRATE 50 UG/ML
INJECTION, SOLUTION INTRAMUSCULAR; INTRAVENOUS PRN
Status: DISCONTINUED | OUTPATIENT
Start: 2018-02-21 | End: 2018-02-21

## 2018-02-21 RX ORDER — DEXAMETHASONE SODIUM PHOSPHATE 4 MG/ML
4 INJECTION, SOLUTION INTRA-ARTICULAR; INTRALESIONAL; INTRAMUSCULAR; INTRAVENOUS; SOFT TISSUE EVERY 10 MIN PRN
Status: DISCONTINUED | OUTPATIENT
Start: 2018-02-21 | End: 2018-02-22 | Stop reason: HOSPADM

## 2018-02-21 RX ORDER — KETOROLAC TROMETHAMINE 30 MG/ML
30 INJECTION, SOLUTION INTRAMUSCULAR; INTRAVENOUS EVERY 6 HOURS PRN
Status: DISCONTINUED | OUTPATIENT
Start: 2018-02-21 | End: 2018-02-22 | Stop reason: HOSPADM

## 2018-02-21 RX ORDER — GABAPENTIN 300 MG/1
300 CAPSULE ORAL ONCE
Status: COMPLETED | OUTPATIENT
Start: 2018-02-21 | End: 2018-02-21

## 2018-02-21 RX ORDER — MEPERIDINE HYDROCHLORIDE 25 MG/ML
12.5 INJECTION INTRAMUSCULAR; INTRAVENOUS; SUBCUTANEOUS
Status: DISCONTINUED | OUTPATIENT
Start: 2018-02-21 | End: 2018-02-22 | Stop reason: HOSPADM

## 2018-02-21 RX ORDER — HYDROMORPHONE HYDROCHLORIDE 1 MG/ML
.3-.5 INJECTION, SOLUTION INTRAMUSCULAR; INTRAVENOUS; SUBCUTANEOUS EVERY 10 MIN PRN
Status: DISCONTINUED | OUTPATIENT
Start: 2018-02-21 | End: 2018-02-22 | Stop reason: HOSPADM

## 2018-02-21 RX ORDER — ONDANSETRON 2 MG/ML
4 INJECTION INTRAMUSCULAR; INTRAVENOUS EVERY 30 MIN PRN
Status: DISCONTINUED | OUTPATIENT
Start: 2018-02-21 | End: 2018-02-22 | Stop reason: HOSPADM

## 2018-02-21 RX ORDER — BACITRACIN ZINC 500 [USP'U]/G
OINTMENT TOPICAL PRN
Status: DISCONTINUED | OUTPATIENT
Start: 2018-02-21 | End: 2018-02-21 | Stop reason: HOSPADM

## 2018-02-21 RX ORDER — DEXAMETHASONE SODIUM PHOSPHATE 4 MG/ML
INJECTION, SOLUTION INTRA-ARTICULAR; INTRALESIONAL; INTRAMUSCULAR; INTRAVENOUS; SOFT TISSUE PRN
Status: DISCONTINUED | OUTPATIENT
Start: 2018-02-21 | End: 2018-02-21

## 2018-02-21 RX ORDER — PROPOFOL 10 MG/ML
INJECTION, EMULSION INTRAVENOUS CONTINUOUS PRN
Status: DISCONTINUED | OUTPATIENT
Start: 2018-02-21 | End: 2018-02-21

## 2018-02-21 RX ORDER — CEFAZOLIN SODIUM 1 G/50ML
3 SOLUTION INTRAVENOUS
Status: COMPLETED | OUTPATIENT
Start: 2018-02-21 | End: 2018-02-21

## 2018-02-21 RX ORDER — PROPOFOL 10 MG/ML
INJECTION, EMULSION INTRAVENOUS PRN
Status: DISCONTINUED | OUTPATIENT
Start: 2018-02-21 | End: 2018-02-21

## 2018-02-21 RX ORDER — SODIUM CHLORIDE, SODIUM LACTATE, POTASSIUM CHLORIDE, CALCIUM CHLORIDE 600; 310; 30; 20 MG/100ML; MG/100ML; MG/100ML; MG/100ML
INJECTION, SOLUTION INTRAVENOUS CONTINUOUS
Status: DISCONTINUED | OUTPATIENT
Start: 2018-02-21 | End: 2018-02-22 | Stop reason: HOSPADM

## 2018-02-21 RX ORDER — LIDOCAINE 40 MG/G
CREAM TOPICAL
Status: DISCONTINUED | OUTPATIENT
Start: 2018-02-21 | End: 2018-02-22 | Stop reason: HOSPADM

## 2018-02-21 RX ORDER — FENTANYL CITRATE 50 UG/ML
25-50 INJECTION, SOLUTION INTRAMUSCULAR; INTRAVENOUS
Status: DISCONTINUED | OUTPATIENT
Start: 2018-02-21 | End: 2018-02-22 | Stop reason: HOSPADM

## 2018-02-21 RX ORDER — HYDROCODONE BITARTRATE AND ACETAMINOPHEN 5; 325 MG/1; MG/1
1-2 TABLET ORAL EVERY 4 HOURS PRN
Qty: 30 TABLET | Refills: 0 | Status: SHIPPED | OUTPATIENT
Start: 2018-02-21 | End: 2019-07-26

## 2018-02-21 RX ORDER — NALOXONE HYDROCHLORIDE 0.4 MG/ML
.1-.4 INJECTION, SOLUTION INTRAMUSCULAR; INTRAVENOUS; SUBCUTANEOUS
Status: DISCONTINUED | OUTPATIENT
Start: 2018-02-21 | End: 2018-02-22 | Stop reason: HOSPADM

## 2018-02-21 RX ORDER — ONDANSETRON 2 MG/ML
INJECTION INTRAMUSCULAR; INTRAVENOUS PRN
Status: DISCONTINUED | OUTPATIENT
Start: 2018-02-21 | End: 2018-02-21

## 2018-02-21 RX ORDER — ACETAMINOPHEN 325 MG/1
975 TABLET ORAL ONCE
Status: COMPLETED | OUTPATIENT
Start: 2018-02-21 | End: 2018-02-21

## 2018-02-21 RX ORDER — LIDOCAINE HYDROCHLORIDE 20 MG/ML
INJECTION, SOLUTION INFILTRATION; PERINEURAL PRN
Status: DISCONTINUED | OUTPATIENT
Start: 2018-02-21 | End: 2018-02-21

## 2018-02-21 RX ORDER — CEFAZOLIN SODIUM 1 G/3ML
1 INJECTION, POWDER, FOR SOLUTION INTRAMUSCULAR; INTRAVENOUS SEE ADMIN INSTRUCTIONS
Status: DISCONTINUED | OUTPATIENT
Start: 2018-02-21 | End: 2018-02-22 | Stop reason: HOSPADM

## 2018-02-21 RX ORDER — HYDROCODONE BITARTRATE AND ACETAMINOPHEN 5; 325 MG/1; MG/1
1 TABLET ORAL ONCE
Status: CANCELLED | OUTPATIENT
Start: 2018-02-21 | End: 2018-02-21

## 2018-02-21 RX ORDER — ALBUTEROL SULFATE 0.83 MG/ML
2.5 SOLUTION RESPIRATORY (INHALATION) EVERY 4 HOURS PRN
Status: DISCONTINUED | OUTPATIENT
Start: 2018-02-21 | End: 2018-02-22 | Stop reason: HOSPADM

## 2018-02-21 RX ORDER — ONDANSETRON 4 MG/1
4 TABLET, ORALLY DISINTEGRATING ORAL EVERY 30 MIN PRN
Status: DISCONTINUED | OUTPATIENT
Start: 2018-02-21 | End: 2018-02-22 | Stop reason: HOSPADM

## 2018-02-21 RX ADMIN — PROPOFOL 80 MG: 10 INJECTION, EMULSION INTRAVENOUS at 12:14

## 2018-02-21 RX ADMIN — GABAPENTIN 300 MG: 300 CAPSULE ORAL at 11:10

## 2018-02-21 RX ADMIN — Medication 30 ML: at 12:29

## 2018-02-21 RX ADMIN — CEFAZOLIN SODIUM 3 G: 1 SOLUTION INTRAVENOUS at 12:06

## 2018-02-21 RX ADMIN — ACETAMINOPHEN 975 MG: 325 TABLET ORAL at 11:10

## 2018-02-21 RX ADMIN — ONDANSETRON 4 MG: 2 INJECTION INTRAMUSCULAR; INTRAVENOUS at 12:37

## 2018-02-21 RX ADMIN — PROPOFOL 50 MG: 10 INJECTION, EMULSION INTRAVENOUS at 12:54

## 2018-02-21 RX ADMIN — BACITRACIN ZINC 1 G: 500 OINTMENT TOPICAL at 12:30

## 2018-02-21 RX ADMIN — FENTANYL CITRATE 50 MCG: 50 INJECTION, SOLUTION INTRAMUSCULAR; INTRAVENOUS at 12:14

## 2018-02-21 RX ADMIN — PROPOFOL 100 MG: 10 INJECTION, EMULSION INTRAVENOUS at 13:03

## 2018-02-21 RX ADMIN — PROPOFOL 100 MCG/KG/MIN: 10 INJECTION, EMULSION INTRAVENOUS at 12:14

## 2018-02-21 RX ADMIN — SODIUM CHLORIDE, SODIUM LACTATE, POTASSIUM CHLORIDE, CALCIUM CHLORIDE: 600; 310; 30; 20 INJECTION, SOLUTION INTRAVENOUS at 12:06

## 2018-02-21 RX ADMIN — FENTANYL CITRATE 50 MCG: 50 INJECTION, SOLUTION INTRAMUSCULAR; INTRAVENOUS at 12:57

## 2018-02-21 RX ADMIN — LIDOCAINE HYDROCHLORIDE 40 MG: 20 INJECTION, SOLUTION INFILTRATION; PERINEURAL at 12:14

## 2018-02-21 RX ADMIN — DEXAMETHASONE SODIUM PHOSPHATE 4 MG: 4 INJECTION, SOLUTION INTRA-ARTICULAR; INTRALESIONAL; INTRAMUSCULAR; INTRAVENOUS; SOFT TISSUE at 12:37

## 2018-02-21 NOTE — ANESTHESIA CARE TRANSFER NOTE
Patient: [place]    Procedure(s):  Bilateral vasectomy - Wound Class: I-Clean    Diagnosis: Sterilization  Diagnosis Additional Information: No value filed.    Anesthesia Type:   MAC     Note:  Airway :Nasal Cannula  Patient transferred to:PACU  Comments: To PACU after LMA D/Cd.  Dentition intact/atraumatic.  Report to RN VSS Respiratory status stable.Handoff Report: Identifed the Patient, Identified the Reponsible Provider, Reviewed the pertinent medical history, Discussed the surgical course, Reviewed Intra-OP anesthesia mangement and issues during anesthesia, Set expectations for post-procedure period and Allowed opportunity for questions and acknowledgement of understanding      Vitals: (Last set prior to Anesthesia Care Transfer)    CRNA VITALS  2/21/2018 1311 - 2/21/2018 1344      2/21/2018             Pulse: 75    SpO2: 100 %    Resp Rate (observed): (!)  3                Electronically Signed By: RAVEN Trevizo CRNA  February 21, 2018  1:44 PM

## 2018-02-21 NOTE — IP AVS SNAPSHOT
[facility]    [address] [place] [address]    Phone:  [phone]                                       After Visit Summary   2/21/2018    [place] P[place]    MRN: [id number]           After Visit Summary Signature Page     I have received my discharge instructions, and my questions have been answered. I have discussed any challenges I see with this plan with the nurse or doctor.    ..........................................................................................................................................  Patient/Patient Representative Signature      ..........................................................................................................................................  Patient Representative Print Name and Relationship to Patient    ..................................................               ................................................  Date                                            Time    ..........................................................................................................................................  Reviewed by Signature/Title    ...................................................              ..............................................  Date                                                            Time

## 2018-02-21 NOTE — ANESTHESIA POSTPROCEDURE EVALUATION
Patient: [place] P[place]    Procedure(s):  Bilateral vasectomy - Wound Class: I-Clean    Diagnosis:Sterilization  Diagnosis Additional Information: No value filed.    Anesthesia Type:  General    Note:  Anesthesia Post Evaluation    Patient location during evaluation: PACU  Patient participation: Able to fully participate in evaluation  Level of consciousness: awake  Pain management: adequate  Airway patency: patent  Cardiovascular status: acceptable  Respiratory status: acceptable  Hydration status: acceptable  PONV: none     Anesthetic complications: None    Comments: He required a full GA with the use of an LMA for surgery  Rather than a MAC, but did well.        Last vitals:  Vitals:    02/21/18 1350 02/21/18 1355 02/21/18 1400   BP: 117/72 119/79 115/77   Resp: 16 16    Temp:      SpO2: 99% 100% 99%         Electronically Signed By: Kevin Francis MD  February 21, 2018  2:03 PM

## 2018-02-21 NOTE — OP NOTE
Procedure: Vasectomy bilateral.   Anesthesia: Local lidocaine injection by surgeon.  Surgeon: ILIANA Everett M.D.   Assistant:  OR scrub staff    Description of procedure: After the patient received education material regarding vasectomy, including risks and benefits, we proceeded with obtaining consent and proceeded with the surgery. He understands that there is a risk of late failure from recanalization. He understands that he is fertile until he has completed one or more semen analyses and he is given clearance from us for unprotected intercourse.  He understands that we will contact regarding the results but it is his responsibility to make sure he is cleared before having unprotected intercourse.  I have discussed with him other risks including bleeding, infection, acute and possible long-term pain.    The right vas was ligated first.  This was done using the standard technique by grasping it with a three-finger  and lifting it up to the skin.  The cord was very thick but the vas was palpable with some patience.  A small amount of lidocaine was infiltrated into the skin and vasal sheath.  The skin was punctured and dilated bluntly using the scalpel-less dissector.  The sheath was identified and a rigid clamp was passed around the vas which was then lifted out of the incision.  The vas was cleaned off from the deferential vessels and the sheath, and cautery was used to divide the vas between mosquitos.  A small segment was removed and discarded.  The lumen of the vas was cannulated to confirm its identity, and the lumens of both ends were cauterized.  Pen cautery was used sparingly/as needed for minor bleeders.  A facial interposition was then performed with a single suture of 4-0 chromic. The skin defect was closed with a 4-0 chromic interrupted suture after confirming adequate hemostasis.       We then turned our attention to the left side and a similar technique was performed.  The vas was just not palpable on  the left, however.  A cord block was performed with 10cc of 0.5% Marcaine due to patient discomfort.  A larger area of skin/muscle was blocked too.  A 5cm vertical skin incision was made, the dartos muscle divided with cautery.  Pt did not tolerate this well due to discomfort and anesthesia converted to a general anesthetic.  The testis was then delivered from the incision.  In the bloodless plane that surrounds the tunica vaginalis, I was able to sweep the cephalad cord free of surrounding attachments, and the vas deferens was finally palpable.  This was grasped with a ring clamp, and a standard vasectomy could then be performed.  This involved division, excision of a segment, cautery of the lumens, and fascial interposition with 4-0 Chromic.  Great time was taken to ensure meticulous hemostasis.  Skin was closed with interrupted 4-0 Monocryl sub-Q, after closing the dartos with a running 3-0 chromic suture.    There were no hematomas noted at the end of the procedure.  The patient tolerated the procedure well.    He was advised to return for a post vasectomy semen check in 3 months, and that he is not sterile and must continue to use contraception until we tell him otherwise (based on follow-up semen specimen(s)).    Plan:  -Post vasectomy semen analysis in 3 months   -ice packs to scrotum X 24h  -keep incision dry X 48h  -Rx for  Vicodin

## 2018-02-21 NOTE — IP AVS SNAPSHOT
MRN:[id number]                      After Visit Summary   2/21/2018    [facility]    MRN: [id number]           Thank you!     Thank you for choosing [facility] for your care. Our goal is always to provide you with excellent care. Hearing back from our patients is one way we can continue to improve our services. Please take a few minutes to complete the written survey that you may receive in the mail after you visit with us. Thank you!        Patient Information     Date Of Birth          1960        About your hospital stay     You were admitted on:  February 21, 2018 You last received care in the:  [facility]    You were discharged on:  February 21, 2018       Who to Call     For medical emergencies, please call 911.  For non-urgent questions about your medical care, please call your primary care provider or clinic, [phone]  For questions related to your surgery, please call your surgery clinic        Attending Provider     Provider Specialty    Kavon Everett MD Urology       Primary Care Provider Office Phone # Fax #    Joleen Geiger [facility] [phone]      After Care Instructions     Diet Instructions       Resume pre procedure diet            Discharge Instructions       Call clinic for post-vasectomy semen analysis instructions to be done in 10 weeks.  PRN clinic follow-up.            Ice to affected area       Ice to operative site.  PRN as tolerated            No Alcohol       for 24 hours post procedure            No Aspirin, Ibuprofen or Naproxen products       for 7  days following surgery            No driving or operating machinery        until the day after procedure            No lifting over 15 pounds and no strenuous physical activity       for 2 weeks                  Your next 10 appointments already scheduled     Mar 05, 2018  4:00 PM CST   Return Visit with Kavon Everett MD   [facility]  [facility]    [address] [address] [place] [id number] [phone]              Further instructions from your care team       [facility]  Same-Day Surgery   Adult Discharge Orders & Instructions   For 24 hours after surgery  1. Get plenty of rest.  A responsible adult must stay with you for at least 24 hours after you leave the hospital.   2. Do not drive or use heavy equipment.  If you have weakness or tingling, don't drive or use heavy equipment until this feeling goes away.  3. Do not drink alcohol.  4. Avoid strenuous or risky activities.  Ask for help when climbing stairs.   5. You may feel lightheaded.  IF so, sit for a few minutes before standing.  Have someone help you get up.   6. If you have nausea (feel sick to your stomach): Drink only clear liquids such as apple juice, ginger ale, broth or 7-Up.  Rest may also help.  Be sure to drink enough fluids.  Move to a regular diet as you feel able.  7. You may have a slight fever. Call the doctor if your fever is over 100 F (37.7 C) (taken under the tongue) or lasts longer than 24 hours.  8. You may have a dry mouth, a sore throat, muscle aches or trouble sleeping.  These should go away after 24 hours.  9. Do not make important or legal decisions.   Call your doctor for any of the followin.  Signs of infection (fever, growing tenderness at the surgery site, a large amount of drainage or bleeding, severe pain, foul-smelling drainage, redness, swelling).    2. It has been over 8 to 10 hours since surgery and you are still not able to urinate (pass water).    3.  Headache for over 24 hours.      To contact a doctor, call __[phone]______         Tylenol was given at 11:10 am.      Pending Results     No orders found from 2018 to 2018.            Admission Information     Date & Time Provider Department Dept. Phone    2018 Kavon Everett MD [facility] [phone]     "  Your Vitals Were     Blood Pressure Temperature Respirations Height Weight Pulse Oximetry    123/76 97.7  F (36.5  C) (Temporal) 13 1.816 m (5' 11.5\") 127 kg (280 lb) 100%    BMI (Body Mass Index)                   38.51 kg/m2           [employer] Information     [employer] lets you send messages to your doctor, view your test results, renew your prescriptions, schedule appointments and more. To sign up, go to [email or web address] . Click on \"Log in\" on the left side of the screen, which will take you to the Welcome page. Then click on \"Sign up Now\" on the right side of the page.     You will be asked to enter the access code listed below, as well as some personal information. Please follow the directions to create your username and password.     Your access code is: [email or web address]  Expires: 2018  9:57 AM     Your access code will  in 90 days. If you need help or a new code, please call your [facility] clinic or [phone].        Care EveryWhere ID     This is your Care EveryWhere ID. This could be used by other organizations to access your [facility] medical records  [id number]        Equal Access to Services     EDDIE PASCUAL AH: Mickey petito Soelizabeth, waaxda luqadaha, qaybta kaalmada adeegyada, samantha bhatti. So [facility] [phone].    ATENCIÓN: Si habla español, tiene a barrett disposición servicios gratuitos de asistencia lingüística. Llame al [phone].    We comply with applicable federal civil rights laws and [place] laws. We do not discriminate on the basis of race, color, national origin, age, disability, sex, sexual orientation, or gender identity.               Review of your medicines      UNREVIEWED medicines. Ask your doctor about these medicines        Dose / Directions    aspirin 81 MG tablet   Used for:  HTN (hypertension)        Dose:  1 tablet   Take 1 tablet by mouth daily.   Quantity:  1 tablet   Refills:  3       hydrochlorothiazide 25 MG tablet " "  Commonly known as:  HYDRODIURIL   Used for:  Benign essential hypertension        Dose:  25 mg   Take 1 tablet (25 mg) by mouth daily   Quantity:  90 tablet   Refills:  3       losartan 100 MG tablet   Commonly known as:  COZAAR   Used for:  Benign essential hypertension        Dose:  100 mg   Take 1 tablet (100 mg) by mouth daily   Quantity:  90 tablet   Refills:  3       tamsulosin 0.4 MG capsule   Commonly known as:  FLOMAX   Used for:  Urinary frequency        Dose:  0.4 mg   Take 1 capsule (0.4 mg) by mouth daily   Quantity:  90 capsule   Refills:  3       UNABLE TO FIND        Dose:  5 tablet   Take 5 tablets by mouth 2 times daily \"Isogenic vitamins\"   Refills:  0         START taking        Dose / Directions    HYDROcodone-acetaminophen 5-325 MG per tablet   Commonly known as:  NORCO   Used for:  Post-op pain        Dose:  1-2 tablet   Take 1-2 tablets by mouth every 4 hours as needed for moderate to severe pain (Moderate to Severe Pain)   Quantity:  30 tablet   Refills:  0            Where to get your medicines      Some of these will need a paper prescription and others can be bought over the counter. Ask your nurse if you have questions.     Bring a paper prescription for each of these medications     HYDROcodone-acetaminophen 5-325 MG per tablet                Protect others around you: Learn how to safely use, store and throw away your medicines at www.disposemymeds.org.        Information about OPIOIDS     PRESCRIPTION OPIOIDS: WHAT YOU NEED TO KNOW    Prescription opioids can be used to help relieve moderate to severe pain and are often prescribed following a surgery or injury, or for certain health conditions. These medications can be an important part of treatment but also come with serious risks. It is important to work with your health care provider to make sure you are getting the safest, most effective care.    WHAT ARE THE RISKS AND SIDE EFFECTS OF OPIOID USE?  Prescription opioids carry " serious risks of addiction and overdose, especially with prolonged use. An opioid overdose, often marked by slowed breathing can cause sudden death. The use of prescription opioids can have a number of side effects as well, even when taken as directed:      Tolerance - meaning you might need to take more of a medication for the same pain relief    Physical dependence - meaning you have symptoms of withdrawal when a medication is stopped    Increased sensitivity to pain    Constipation    Nausea, vomiting, and dry mouth    Sleepiness and dizziness    Confusion    Depression    Low levels of testosterone that can result in lower sex drive, energy, and strength    Itching and sweating    RISKS ARE GREATER WITH:    History of drug misuse, substance use disorder, or overdose    Mental health conditions (such as depression or anxiety)    Sleep apnea    Older age (65 years or older)    Pregnancy    Avoid alcohol while taking prescription opioids.   Also, unless specifically advised by your health care provider, medications to avoid include:    Benzodiazepines (such as Xanax or Valium)    Muscle relaxants (such as Soma or Flexeril)    Hypnotics (such as Ambien or Lunesta)    Other prescription opioids    KNOW YOUR OPTIONS:  Talk to your health care provider about ways to manage your pain that do not involve prescription opioids. Some of these options may actually work better and have fewer risks and side effects:    Pain relievers such as acetaminophen, ibuprofen, and naproxen    Some medications that are also used for depression or seizures    Physical therapy and exercise    Cognitive behavioral therapy, a psychological, goal-directed approach, in which patients learn how to modify physical, behavioral, and emotional triggers of pain and stress    IF YOU ARE PRESCRIBED OPIOIDS FOR PAIN:    Never take opioids in greater amounts or more often than prescribed    Follow up with your primary health care provider and work  "together to create a plan on how to manage your pain.    Talk about ways to help manage your pain that do not involve prescription opioids    Talk about all concerns and side effects    Help prevent misuse and abuse    Never sell or share prescription opioids    Never use another person's prescription opioids    Store prescription opioids in a secure place and out of reach of others (this may include visitors, children, friends, and family)    Visit www.cdc.gov/drugoverdose to learn about risks of opioid abuse and overdose    If you believe you may be struggling with addiction, tell your health care provider and ask for guidance or call [facility]'s National Helpline at 8-031-584-HELP    LEARN MORE / www.cdc.gov/drugoverdose/prescribing/guideline.html    Safely dispose of unused prescription opioids: Find your local drug take-back programs and more information about the importance of safe disposal at www.doseofreality.mn.gov             Medication List: This is a list of all your medications and when to take them. Check marks below indicate your daily home schedule. Keep this list as a reference.      Medications           Morning Afternoon Evening Bedtime As Needed    aspirin 81 MG tablet   Take 1 tablet by mouth daily.                                hydrochlorothiazide 25 MG tablet   Commonly known as:  HYDRODIURIL   Take 1 tablet (25 mg) by mouth daily                                HYDROcodone-acetaminophen 5-325 MG per tablet   Commonly known as:  NORCO   Take 1-2 tablets by mouth every 4 hours as needed for moderate to severe pain (Moderate to Severe Pain)                                losartan 100 MG tablet   Commonly known as:  COZAAR   Take 1 tablet (100 mg) by mouth daily                                tamsulosin 0.4 MG capsule   Commonly known as:  FLOMAX   Take 1 capsule (0.4 mg) by mouth daily                                UNABLE TO FIND   Take 5 tablets by mouth 2 times daily \"Isogenic vitamins\"          "

## 2018-02-21 NOTE — DISCHARGE INSTRUCTIONS
[facility]  Same-Day Surgery   Adult Discharge Orders & Instructions   For 24 hours after surgery  1. Get plenty of rest.  A responsible adult must stay with you for at least 24 hours after you leave the hospital.   2. Do not drive or use heavy equipment.  If you have weakness or tingling, don't drive or use heavy equipment until this feeling goes away.  3. Do not drink alcohol.  4. Avoid strenuous or risky activities.  Ask for help when climbing stairs.   5. You may feel lightheaded.  IF so, sit for a few minutes before standing.  Have someone help you get up.   6. If you have nausea (feel sick to your stomach): Drink only clear liquids such as apple juice, ginger ale, broth or 7-Up.  Rest may also help.  Be sure to drink enough fluids.  Move to a regular diet as you feel able.  7. You may have a slight fever. Call the doctor if your fever is over 100 F (37.7 C) (taken under the tongue) or lasts longer than 24 hours.  8. You may have a dry mouth, a sore throat, muscle aches or trouble sleeping.  These should go away after 24 hours.  9. Do not make important or legal decisions.   Call your doctor for any of the followin.  Signs of infection (fever, growing tenderness at the surgery site, a large amount of drainage or bleeding, severe pain, foul-smelling drainage, redness, swelling).    2. It has been over 8 to 10 hours since surgery and you are still not able to urinate (pass water).    3.  Headache for over 24 hours.      To contact a doctor, call __[phone]______         Tylenol was given at 11:10 am.

## 2018-02-21 NOTE — ANESTHESIA PREPROCEDURE EVALUATION
Anesthesia Evaluation     . Pt has had prior anesthetic. Type: General    No history of anesthetic complications          ROS/MED HX    ENT/Pulmonary:     (+)asthma , . .    Neurologic:  - neg neurologic ROS     Cardiovascular:     (+) hypertension----. : . . . :. .       METS/Exercise Tolerance:     Hematologic:  - neg hematologic  ROS       Musculoskeletal:  - neg musculoskeletal ROS       GI/Hepatic:  - neg GI/hepatic ROS       Renal/Genitourinary:  - ROS Renal section negative       Endo:     (+) Obesity, .      Psychiatric:     (+) psychiatric history anxiety      Infectious Disease:  - neg infectious disease ROS       Malignancy:      - no malignancy   Other:    - neg other ROS                 Physical Exam  Normal systems: cardiovascular, pulmonary and dental    Airway   Mallampati: I  TM distance: >3 FB  Neck ROM: full    Dental     Cardiovascular       Pulmonary    breath sounds clear to auscultation                    Anesthesia Plan      History & Physical Review  History and physical reviewed and following examination; no interval change.    ASA Status:  2 .    NPO Status:  > 8 hours    Plan for MAC with Intravenous and Propofol induction. Maintenance will be TIVA.  Reason for MAC:  Extreme anxiety (QS)  PONV prophylaxis:  Ondansetron (or other 5HT-3) and Dexamethasone or Solumedrol       Postoperative Care  Postoperative pain management:  Multi-modal analgesia.      Consents  Anesthetic plan, risks, benefits and alternatives discussed with:  Patient..                          .

## 2018-03-05 ENCOUNTER — OFFICE VISIT (OUTPATIENT)
Dept: UROLOGY | Facility: CLINIC | Age: 58
End: 2018-03-05
Payer: COMMERCIAL

## 2018-03-05 VITALS — OXYGEN SATURATION: 97 % | DIASTOLIC BLOOD PRESSURE: 77 MMHG | SYSTOLIC BLOOD PRESSURE: 122 MMHG | HEART RATE: 64 BPM

## 2018-03-05 DIAGNOSIS — Z98.52 S/P VASECTOMY: Primary | ICD-10-CM

## 2018-03-05 DIAGNOSIS — Z09 POSTOP CHECK: ICD-10-CM

## 2018-03-05 PROCEDURE — 99024 POSTOP FOLLOW-UP VISIT: CPT | Performed by: UROLOGY

## 2018-03-05 ASSESSMENT — PAIN SCALES - GENERAL: PAINLEVEL: NO PAIN (0)

## 2018-03-05 NOTE — PROGRESS NOTES
CC: [facility] is post-op from bilateral vasectomy.  HPI: Patient is 2 wks post op.  he has been doing well. No fevers or chills. Pain decreasing.   Exam: Afebrile. NAD.   Incision healing well. No discharge, erythema or fluctuence suggestive of infection.     PLAN: F/U with semen analysis in 10-12 weeks.  Advised they keep using protection until then.    Reese OLIVER

## 2018-03-05 NOTE — MR AVS SNAPSHOT
After Visit Summary   3/5/2018    [place]    MRN: [id number]           Patient Information     Date Of Birth          1960        Visit Information        Provider Department      3/5/2018 4:00 PM Kavon Everett MD [facility]        Today's Diagnoses     S/P vasectomy    -  1      Care Instructions    Vasectomy Post-Op Care Instructions     You may go home after the procedure is completed. There may be some pain in your groin for 3 or 4 days after the operation. Some blood or yellow liquid may ooze from the cuts on the outside. The area around the cuts may swell and bruise. The sutures will dissolve on their own and do not require removal by the physician.    The first 48 hours after the procedure are crucial to healing. Generally, you may feel very good the day after the procedure, but that does not mean it is time to go back to normal activities. Resuming normal activities too soon is likely to cause internal bleeding and lots of pain.      Your provider may advise the following ways to care for yourself after the procedure:    Put an ice bag or package of frozen peas covered by a thin towel over the scrotum after the procedure. Leave the ice on the area for 30 minutes and then take it off for 30 minutes. Do this off and on for at least 24 hours.      Avoid all heavy lifting (greater than 10 pounds) for at least one week.    Wear a jockstrap or tight-fitting underwear for approximately one week to support the scrotum (testicles) and help reduce discomfort.    Take a pain reliever, such as Acetaminophen (Tylenol) or Ibuprofen (Advil), for any pain after the procedure. Your provider may prescribe a stronger pain medicine if it is needed.    You should be able to return to work in 48 hours, but strenuous activity should be avoided for two weeks.    Ejaculation should be avoided for one week to allow the area to heal.    Follow-Up    You will need to have a negative  post vasectomy analyses (no sperm seen) before you can discontinue birth control.    Semen Analysis   Schedule the post-vasectomy semen analysis three months after your vasectomy. You will need to ejaculate at least 20 times between your surgery and the first sample. Clinic staff will contact your regarding your results via phone.    You will be given a container after the procedure. Collect the specimen at home by masturbation only (no lubrication, powders, saliva, or intercourse can be used) and bring it to the laboratory. You must have an appointment to drop off your specimen. The specimen needs to be delivered to the lab within 30-45 minutes.    Call [phone] with questions. For concerns or questions after hours or on weekends, please page the Urology Resident on-call: [phone].            Follow-ups after your visit        Your next 10 appointments already scheduled     Mar 05, 2018  4:00 PM CST   Return Visit with Kavon Everett MD   [facility]    [address] [place] [address] [phone]              Future tests that were ordered for you today     Open Future Orders        Priority Expected Expires Ordered    Semen Post Vasectomy Qual ([place], [place], [place]) Routine 6/5/2018 3/5/2019 3/5/2018            Who to contact     If you have questions or need follow up information about today's clinic visit or your schedule please contact [facility] directly at [phone].  Normal or non-critical lab and imaging results will be communicated to you by MyChart, letter or phone within 4 business days after the clinic has received the results. If you do not hear from us within 7 days, please contact the clinic through MyChart or phone. If you have a critical or abnormal lab result, we will notify you by phone as soon as possible.  Submit refill requests through [employer] or call your pharmacy and they will forward the refill request  to us. Please allow 3 business days for your refill to be completed.          Additional Information About Your Visit        [employer] Information     [employer] is an electronic gateway that provides easy, online access to your medical records. With [employer], you can request a clinic appointment, read your test results, renew a prescription or communicate with your care team.     To sign up for [employer] visit the website at [email or web address]   You will be asked to enter the access code listed below, as well as some personal information. Please follow the directions to create your username and password.     Your access code is: [email or web address]  Expires: 2018  9:57 AM     Your access code will  in 90 days. If you need help or a new code, please contact your [facility] or call [phone] for assistance.        Care EveryWhere ID     This is your Care EveryWhere ID. This could be used by other organizations to access your [facility] medical records  [id number]        Your Vitals Were     Pulse Pulse Oximetry                64 97%           Blood Pressure from Last 3 Encounters:   18 122/77   18 121/77   18 138/88    Weight from Last 3 Encounters:   18 127 kg (280 lb)   18 127.5 kg (281 lb)   18 126.1 kg (277 lb 14.4 oz)               Primary Care Provider Office Phone # Fax #    Joleen Geiger Ta Garcia, APRN [facility] [phone] [address] [facility] [address]        Equal Access to Services     DEDIE PASCUAL : Hadii aad ku hadasho Soomaali, waaxda luqadaha, qaybta kaalmada adeegyada, samantha hernandez . So [facility] [phone].    ATENCIÓN: Si habla español, tiene a barrett disposición servicios gratuitos de asistencia lingüística. Llame al [phone].    We comply with applicable federal civil rights laws and [place] laws. We do not discriminate on the basis of race, color, national origin, age,  "disability, sex, sexual orientation, or gender identity.            Thank you!     Thank you for choosing [facility]  for your care. Our goal is always to provide you with excellent care. Hearing back from our patients is one way we can continue to improve our services. Please take a few minutes to complete the written survey that you may receive in the mail after your visit with us. Thank you!             Your Updated Medication List - Protect others around you: Learn how to safely use, store and throw away your medicines at www.disposemymeds.org.          This list is accurate as of 3/5/18  1:42 PM.  Always use your most recent med list.                   Brand Name Dispense Instructions for use Diagnosis    aspirin 81 MG tablet     1 tablet    Take 1 tablet by mouth daily.    HTN (hypertension)       hydrochlorothiazide 25 MG tablet    HYDRODIURIL    90 tablet    Take 1 tablet (25 mg) by mouth daily    Benign essential hypertension       HYDROcodone-acetaminophen 5-325 MG per tablet    NORCO    30 tablet    Take 1-2 tablets by mouth every 4 hours as needed for moderate to severe pain (Moderate to Severe Pain)    Post-op pain       losartan 100 MG tablet    COZAAR    90 tablet    Take 1 tablet (100 mg) by mouth daily    Benign essential hypertension       tamsulosin 0.4 MG capsule    FLOMAX    90 capsule    Take 1 capsule (0.4 mg) by mouth daily    Urinary frequency       UNABLE TO FIND      Take 5 tablets by mouth 2 times daily \"Isogenic vitamins\"          "

## 2018-03-05 NOTE — PATIENT INSTRUCTIONS
Vasectomy Post-Op Care Instructions     You may go home after the procedure is completed. There may be some pain in your groin for 3 or 4 days after the operation. Some blood or yellow liquid may ooze from the cuts on the outside. The area around the cuts may swell and bruise. The sutures will dissolve on their own and do not require removal by the physician.    The first 48 hours after the procedure are crucial to healing. Generally, you may feel very good the day after the procedure, but that does not mean it is time to go back to normal activities. Resuming normal activities too soon is likely to cause internal bleeding and lots of pain.      Your provider may advise the following ways to care for yourself after the procedure:    Put an ice bag or package of frozen peas covered by a thin towel over the scrotum after the procedure. Leave the ice on the area for 30 minutes and then take it off for 30 minutes. Do this off and on for at least 24 hours.      Avoid all heavy lifting (greater than 10 pounds) for at least one week.    Wear a jockstrap or tight-fitting underwear for approximately one week to support the scrotum (testicles) and help reduce discomfort.    Take a pain reliever, such as Acetaminophen (Tylenol) or Ibuprofen (Advil), for any pain after the procedure. Your provider may prescribe a stronger pain medicine if it is needed.    You should be able to return to work in 48 hours, but strenuous activity should be avoided for two weeks.    Ejaculation should be avoided for one week to allow the area to heal.    Follow-Up    You will need to have a negative post vasectomy analyses (no sperm seen) before you can discontinue birth control.    Semen Analysis   Schedule the post-vasectomy semen analysis three months after your vasectomy. You will need to ejaculate at least 20 times between your surgery and the first sample. Clinic staff will contact your regarding your results via phone.    You will be given a  container after the procedure. Collect the specimen at home by masturbation only (no lubrication, powders, saliva, or intercourse can be used) and bring it to the laboratory. You must have an appointment to drop off your specimen. The specimen needs to be delivered to the lab within 30-45 minutes.    Call [phone] with questions. For concerns or questions after hours or on weekends, please page the Urology Resident on-call: [phone].

## 2018-03-05 NOTE — NURSING NOTE
"Foreign Pond's goals for this visit include:   Chief Complaint   Patient presents with     RECHECK     2 week post-op       He requests these members of his care team be copied on today's visit information: Yes    PCP: Joleen Garcia    Referring Provider:  No referring provider defined for this encounter.    Chief Complaint   Patient presents with     RECHECK     2 week post-op       Initial /77 (BP Location: Left arm, Patient Position: Sitting, Cuff Size: Adult Regular)  Pulse 64  SpO2 97% Estimated body mass index is 38.51 kg/(m^2) as calculated from the following:    Height as of 2/20/18: 1.816 m (5' 11.5\").    Weight as of 2/20/18: 127 kg (280 lb).  Medication Reconciliation: complete    Do you need any medication refills at today's visit? No    "

## 2018-04-06 DIAGNOSIS — Z98.52 S/P VASECTOMY: ICD-10-CM

## 2018-04-06 LAB
SPERM MOTILE SMN QL MICRO: NORMAL
SPERM P VAS SMN QL MICRO: NORMAL

## 2018-04-06 PROCEDURE — 89321 SEMEN ANAL SPERM DETECTION: CPT | Performed by: UROLOGY

## 2018-04-11 ENCOUNTER — TELEPHONE (OUTPATIENT)
Dept: UROLOGY | Facility: CLINIC | Age: 58
End: 2018-04-11

## 2018-04-11 DIAGNOSIS — Z98.52 S/P VASECTOMY: Primary | ICD-10-CM

## 2018-04-11 NOTE — TELEPHONE ENCOUNTER
Received 4/6/18 SA result note from Dr. Everett. Called and spoke to MISAEL to verify order for quantitative semen analysis. Per MISAEL, the order should be for the post-vasectomy analysis (MISAEL) to complete the quantitative analysis. SA ordered per Dr. Everett. Called and spoke to patient who is aware of the 4/6/18 post-vasectomy SA results, result note and recommendations from Dr. Everett. Patient verbalized understanding. Telephone number for the [facility] provided to patient who will call to schedule a lab appointment in 1 month.    Maddy Reddy RN, BSN

## 2018-04-11 NOTE — TELEPHONE ENCOUNTER
[facility] Call Center    Phone Message    May a detailed message be left on voicemail: yes    Reason for Call: Requesting Results   Name/type of test: Post Vasectomy SA  Date of test: 04/06/18  Was test done at a location other than [facility] (Please fill in the location if not [facility])?: No      Action Taken: Message routed to:  Adult Clinics: Urology p [phone]

## 2018-05-16 DIAGNOSIS — Z98.52 S/P VASECTOMY: ICD-10-CM

## 2018-05-16 LAB
ABSTINENCE DAYS: 6 DAYS (ref 2–7)
AGGLUTINATION: NO YES/NO
ANALYSIS TEMP - CENTIGRADE: 23 CENTIGRADE
CELL FRAGMENTS: ABNORMAL %
COLLECTION METHOD: ABNORMAL
COLLECTION SITE: ABNORMAL
CONSENT TO RELEASE TO PARTNER: NO
IMMATURE SPERM: ABNORMAL %
IMMOTILE: 100 %
LAB RECEIPT TIME: ABNORMAL
LIQUEFIED: YES YES/NO
NON-PROGRESSIVE MOTILITY: 0 %
PROGRESSIVE MOTILITY: 0 % (ref 32–?)
ROUND CELLS: 0 MILLION/ML (ref ?–2)
SPECIMEN CONCENTRATION: ABNORMAL MILLION/ML (ref 15–?)
SPECIMEN PH: 7.6 PH (ref 7.2–?)
SPECIMEN TYPE: ABNORMAL
SPECIMEN VOL UR: 2.1 ML (ref 1.5–?)
TIME OF ANALYSIS: ABNORMAL
TOTAL NUMBER: ABNORMAL MILLION (ref 39–?)
TOTAL PROGRESSIVE MOTILE: 0 MILLION (ref 15.6–?)
VISCOUS: NO YES/NO
WBC SPECIMEN: ABNORMAL %

## 2018-05-16 PROCEDURE — 89321 SEMEN ANAL SPERM DETECTION: CPT

## 2018-05-18 NOTE — PROGRESS NOTES
Please let Mr. Pond  know that his post -vasectomy semen analysis shows very rare presence of a few non-motile ( dead) sperm.  This is almost certainly a perfect success but I advise he continue to use birth control and perform another semen analysis in a month to verify the result.    Thanks  CHRIS

## 2018-06-30 DIAGNOSIS — R35.0 URINARY FREQUENCY: ICD-10-CM

## 2018-06-30 DIAGNOSIS — I10 BENIGN ESSENTIAL HYPERTENSION: ICD-10-CM

## 2018-07-02 RX ORDER — HYDROCHLOROTHIAZIDE 25 MG/1
TABLET ORAL
Qty: 90 TABLET | Refills: 1 | Status: SHIPPED | OUTPATIENT
Start: 2018-07-02 | End: 2018-08-17

## 2018-07-02 RX ORDER — LOSARTAN POTASSIUM 100 MG/1
TABLET ORAL
Qty: 90 TABLET | Refills: 1 | Status: SHIPPED | OUTPATIENT
Start: 2018-07-02 | End: 2018-08-17

## 2018-07-02 RX ORDER — TAMSULOSIN HYDROCHLORIDE 0.4 MG/1
CAPSULE ORAL
Qty: 90 CAPSULE | Refills: 1 | Status: SHIPPED | OUTPATIENT
Start: 2018-07-02 | End: 2018-08-17

## 2018-07-02 NOTE — TELEPHONE ENCOUNTER
"Requested Prescriptions   Pending Prescriptions Disp Refills     tamsulosin (FLOMAX) 0.4 MG capsule [Pharmacy Med Name: TAMSULOSIN 0.4MG CAP] 90 capsule     Last Written Prescription Date:  04/19/18  Last Fill Quantity: 90,  # refills: 0   Last office visit: 2/19/2018 with prescribing provider:  ILIANA Garcia Future Office Visit:     Sig: TAKE 1 CAPSULE BY MOUTH ONCE A DAY    Alpha Blockers Passed    6/30/2018  1:01 AM       Passed - Blood pressure under 140/90 in past 12 months    BP Readings from Last 3 Encounters:   03/05/18 122/77   02/21/18 121/77   02/19/18 138/88                Passed - Recent (12 mo) or future (30 days) visit within the authorizing provider's specialty    Patient had office visit in the last 12 months or has a visit in the next 30 days with authorizing provider or within the authorizing provider's specialty.  See \"Patient Info\" tab in inbasket, or \"Choose Columns\" in Meds & Orders section of the refill encounter.           Passed - Patient does not have Tadalafil, Vardenafil, or Sildenafil on their medication list       Passed - Patient is 18 years of age or older        hydrochlorothiazide (HYDRODIURIL) 25 MG tablet [Pharmacy Med Name: HYDROCHLOROTHIAZIDE 25MG TAB] 90 tablet     Last Written Prescription Date:  04/19/18  Last Fill Quantity: 90,  # refills: 0   Last office visit: 2/19/2018 with prescribing provider:  ILIANA Garcia Future Office Visit:     Sig: TAKE 1 TABLET BY MOUTH ONCE DAILY    Diuretics (Including Combos) Protocol Passed    6/30/2018  1:01 AM       Passed - Blood pressure under 140/90 in past 12 months    BP Readings from Last 3 Encounters:   03/05/18 122/77   02/21/18 121/77   02/19/18 138/88                Passed - Recent (12 mo) or future (30 days) visit within the authorizing provider's specialty    Patient had office visit in the last 12 months or has a visit in the next 30 days with authorizing provider or within the authorizing provider's specialty.  See \"Patient Info\" " "tab in inbasket, or \"Choose Columns\" in Meds & Orders section of the refill encounter.           Passed - Patient is age 18 or older       Passed - Normal serum creatinine on file in past 12 months    Recent Labs   Lab Test  10/17/17   1140   CR  1.02             Passed - Normal serum potassium on file in past 12 months    Recent Labs   Lab Test  10/17/17   1140   POTASSIUM  3.4                   Passed - Normal serum sodium on file in past 12 months    Recent Labs   Lab Test  10/17/17   1140   NA  137              losartan (COZAAR) 100 MG tablet [Pharmacy Med Name: LOSARTAN 100MG TABLET] 90 tablet     Last Written Prescription Date:  04/19/18  Last Fill Quantity: 90,  # refills: 0   Last office visit: 2/19/2018 with prescribing provider:  ILIANA Garcia Future Office Visit:     Sig: TAKE 1 TABLET BY MOUTH ONCE DAILY    Angiotensin-II Receptors Passed    6/30/2018  1:01 AM       Passed - Blood pressure under 140/90 in past 12 months    BP Readings from Last 3 Encounters:   03/05/18 122/77   02/21/18 121/77   02/19/18 138/88                Passed - Recent (12 mo) or future (30 days) visit within the authorizing provider's specialty    Patient had office visit in the last 12 months or has a visit in the next 30 days with authorizing provider or within the authorizing provider's specialty.  See \"Patient Info\" tab in inbasket, or \"Choose Columns\" in Meds & Orders section of the refill encounter.           Passed - Patient is age 18 or older       Passed - Normal serum creatinine on file in past 12 months    Recent Labs   Lab Test  10/17/17   1140   CR  1.02            Passed - Normal serum potassium on file in past 12 months    Recent Labs   Lab Test  10/17/17   1140   POTASSIUM  3.4                      "

## 2018-08-01 ENCOUNTER — OFFICE VISIT (OUTPATIENT)
Dept: PEDIATRICS | Facility: CLINIC | Age: 58
End: 2018-08-01
Payer: COMMERCIAL

## 2018-08-01 VITALS
SYSTOLIC BLOOD PRESSURE: 137 MMHG | OXYGEN SATURATION: 97 % | HEART RATE: 62 BPM | TEMPERATURE: 98 F | DIASTOLIC BLOOD PRESSURE: 86 MMHG | BODY MASS INDEX: 42.14 KG/M2 | WEIGHT: 306.4 LBS

## 2018-08-01 DIAGNOSIS — H11.32 SUBCONJUNCTIVAL HEMORRHAGE, LEFT: Primary | ICD-10-CM

## 2018-08-01 DIAGNOSIS — I10 BENIGN ESSENTIAL HYPERTENSION: ICD-10-CM

## 2018-08-01 PROCEDURE — 99213 OFFICE O/P EST LOW 20 MIN: CPT | Performed by: FAMILY MEDICINE

## 2018-08-01 ASSESSMENT — PAIN SCALES - GENERAL: PAINLEVEL: NO PAIN (0)

## 2018-08-01 NOTE — PROGRESS NOTES
SUBJECTIVE:   Foreign Pond is a 58 year old male who presents to clinic today for the following health issues  :  Patient is new to the provider, is here with concerns of having a left eye redness for the past 3-4 days with no concerns for double vision, blurred vision, headache, right eye symptoms, abnormal eye discharge, URI symptoms.  Patient denies previous similar episodes in the past.  Patient denies recent episodes of coughing, sneezing.  Past medical history significant for underlying hypertension, hyperlipidemia, testicular hypofunction, morbid obesity and benign prostatic hypertrophy.    Eye(s) Problem          Duration: Sunday    Description:  Location: left  Pain: no  Redness: YES and watering a little more than normal  Discharge: no    Accompanying signs and symptoms: none    History (Trauma, foreign body exposure,): None    Precipitating or alleviating factors (contact use): None    Therapies tried and outcome: None          Problem list and histories reviewed & adjusted, as indicated.  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]     Morbid obesity (H)     Generalized anxiety disorder     Urinary frequency     Benign essential hypertension     Testicular hypofunction     Hyperlipidemia with target LDL less than 130     Past Surgical History:   Procedure Laterality Date     COLONOSCOPY  7/2006    repeat in 10 years     COLONOSCOPY N/A 12/12/2016    Procedure: COLONOSCOPY;  Surgeon: Delonte Miller MD;  Location: [facility]     CREATE EARDRUM OPENING,LOCAL ANESTH  age 15     HC ECP WITH CATARACT SURGERY       LAPAROSCOPIC CHOLECYSTECTOMY  11/8/2012    Procedure: LAPAROSCOPIC CHOLECYSTECTOMY;  Laparoscopic Cholecystectomy & repair of umbilical hernia;  Surgeon: Delonte Miller MD;  Location: [facility]     LASIK BILATERAL  2011     VASECTOMY Bilateral 2/21/2018    Procedure: VASECTOMY;  Bilateral vasectomy;  Surgeon: Kavon Everett MD;  Location:   "OR       Social History   Substance Use Topics     Smoking status: Never Smoker     Smokeless tobacco: Never Used     Alcohol use No     Family History   Problem Relation Age of Onset     Hypertension Father      C.A.D. Father      Thyroid Disease Father      Respiratory Mother      asthma     Cancer Mother      Alcohol/Drug Maternal Grandfather      Cerebrovascular Disease Paternal Grandfather      Alcohol/Drug Paternal Grandfather      Hypertension Brother      HEART DISEASE Brother          Current Outpatient Prescriptions   Medication Sig Dispense Refill     aspirin 81 MG tablet Take 1 tablet by mouth daily. 1 tablet 3     hydrochlorothiazide (HYDRODIURIL) 25 MG tablet TAKE 1 TABLET BY MOUTH ONCE DAILY 90 tablet 1     HYDROcodone-acetaminophen (NORCO) 5-325 MG per tablet Take 1-2 tablets by mouth every 4 hours as needed for moderate to severe pain (Moderate to Severe Pain) 30 tablet 0     losartan (COZAAR) 100 MG tablet TAKE 1 TABLET BY MOUTH ONCE DAILY 90 tablet 1     tamsulosin (FLOMAX) 0.4 MG capsule TAKE 1 CAPSULE BY MOUTH ONCE A DAY 90 capsule 1     UNABLE TO FIND Take 5 tablets by mouth 2 times daily \"Isogenic vitamins\"       Allergies   Allergen Reactions     Lisinopril Cough     Recent Labs   Lab Test  10/17/17   1140  05/12/17   0947   12/18/15   0855  10/20/14   1009  04/20/14   0210  06/29/13   0724  09/17/12   1012   05/18/11   0856   LDL   --   106*   --   111*  114   --   105   --    < >  125   HDL   --   48   --   43  41   --   36*   --    < >  40   TRIG   --   91   --   92  133   --   217*   --    < >  156*   ALT   --    --    --    --    --   56   --   41   --   26   CR  1.02  0.92   < >   --    --   0.86  0.87  0.94   < >  0.91   GFRESTIMATED  76  86   < >   --    --   >90  >90  85   < >  89   GFRESTBLACK  >90  >90  African American GFR Calc     < >   --    --   >90  >90  >90   < >  >90   POTASSIUM  3.4  4.1   < >   --    --   3.8  4.1  3.7   < >  3.9   TSH   --    --    --    --    --    --   " 2.76   --    --   2.17    < > = values in this interval not displayed.      BP Readings from Last 3 Encounters:   08/01/18 137/86   03/05/18 122/77   02/21/18 121/77    Wt Readings from Last 3 Encounters:   08/01/18 306 lb 6.4 oz (139 kg)   02/20/18 280 lb (127 kg)   02/19/18 281 lb (127.5 kg)                  Labs reviewed in EPIC    Reviewed and updated as needed this visit by clinical staff  Tobacco  Allergies  Med Hx  Surg Hx  Fam Hx  Soc Hx      Reviewed and updated as needed this visit by Provider         ROS:  CONSTITUTIONAL: NEGATIVE for fever, chills, change in weight  EYES: as above  CV: History of hypertension    OBJECTIVE:     /86 (BP Location: Right arm, Patient Position: Sitting, Cuff Size: Adult Large)  Pulse 62  Temp 98  F (36.7  C) (Oral)  Wt 306 lb 6.4 oz (139 kg)  SpO2 97%  BMI 42.14 kg/m2  Body mass index is 42.14 kg/(m^2).  GENERAL: healthy, alert and no distress  EYES: Right eye-normal  Left eye-normal conjunctiva, normal cornea, normal pupil, normal extraocular movements  About 1-2 mm, subconjunctival hemorrhagic spot closer to the medial canthus of the left eye    Diagnostic Test Results:  none     ASSESSMENT/PLAN:             1. Subconjunctival hemorrhage, left  Likely from his underlying hypertension.    Recommended patient not to rub the eye,   try over-the-counter artificial teardrops 3-4 times a day for lubrication  Expect spontaneous resolution in the next few days  If no better in 7-10 days, he will call to schedule for a consult  - OPHTHALMOLOGY ADULT REFERRAL    2. Benign essential hypertension  Initial blood pressure was 125/91, rechecked-137/86, continue with current medications.        Patient is desiring to switch providers due to relocation  He will schedule for fasting labs and physical in the next 2-3 months  Patient verbalised understanding and is agreeable to the plan.          Chart documentation done in part with Dragon Voice recognition Software. Although  reviewed after completion, some word and grammatical error may remain.    See Patient Instructions    Uli Owens MD  [facility]

## 2018-08-01 NOTE — MR AVS SNAPSHOT
After Visit Summary   8/1/2018    Foreign Pond    MRN: [id number]           Patient Information     Date Of Birth          1960        Visit Information        Provider Department      8/1/2018 4:50 PM Uli Owens MD [facility]        Today's Diagnoses     Subconjunctival hemorrhage, left    -  1    Benign essential hypertension          Care Instructions    Call to schedule for physical, fasting labs in 2-3 weeks and for BP recheck  Try OTC artificial tears on the left eye for 2-3 days  Do not rub the eyes, make sure to take the BP medications  Subconjunctival Hemorrhage    A subconjunctival hemorrhage is when a blood vessel breaks open in the white of the eye. It causes a bright red patch in the white of the eye. It is similar to a bruise on the skin. This type of hemorrhage is common. It can look quite alarming, but it is usually harmless.  Understanding the conjunctiva  The conjunctiva is the thin layer that covers the inside of the eyelids and the surface of the eye. It has many tiny blood vessels that bring oxygen and nutrients to the eye. The sclera is the white part of the eye that lies beneath the conjunctiva. Sometimes a blood vessel in the conjunctiva breaks open and bleeds. The blood then collects under the conjunctiva and turns part of the eye red. Over several weeks, your body then absorbs the blood.  What causes subconjunctival hemorrhage?  In many cases the cause isn t known. But some health conditions may make it more likely. These include:    Eye injury    Eye surgery    High blood pressure    Inflammation of the conjunctiva    Contact lens use    Diabetes    Arteriosclerosis    Tumor of the conjunctiva    Diseases that affect blood clotting    Violent sneezing, coughing, or vomiting    Certain medicines that can increase bleeding, such as aspirin    Pushing hard during childbirth    Straining during constipation  Symptoms of subconjunctival  hemorrhage  The main symptom is a red patch on the eye. You may notice it after waking up in the morning. In most cases just one eye will have a hemorrhage. It usually happens once and then goes away. But some health conditions may cause repeat hemorrhages. You may feel like you have something in your eye, but this is not common. The hemorrhage shouldn t affect your eyesight or cause any pain. If you do have pain, you may have another type of problem with your eye.  Diagnosing subconjunctival hemorrhage  Your healthcare provider will ask about your health history. You may have a physical exam. This includes a basic eye exam. Your provider will make sure you don t have other causes of red eye that may need other treatment.  You will need to see an eye doctor (ophthalmologist) if you have had an eye injury. This doctor might use a special lighted microscope to look closely at your eye. This helps show the doctor if the injury hurt the eye itself and not just its outer layer.  If this is not your first subconjunctival hemorrhage, your doctor may need to find the cause. For example, you may need blood tests to check for a blood clotting disorder.  Treatment for subconjunctival hemorrhage  In most cases you will not need treatment. The red patch will usually go away on its own in a few weeks. It will turn from red to brown and then yellow. There are no treatments to speed up this process. Your doctor may suggest you use a warm compress and artificial tears eye drops to help relieve some of the redness.  If your subconjunctival hemorrhage was caused by a health condition, that condition will be treated. For example, you may need a blood pressure medicine to treat high blood pressure.  When to call your healthcare provider  Call your healthcare provider right away if you have any of these:    Hemorrhage that doesn t go away in 2 to 3 weeks    Eye pain    Loss of eyesight    Another subconjunctival hemorrhage    Date Last  Reviewed: 2017-2017 [employer]. [address] [place] [address], [place] [address]. All rights reserved. This information is not intended as a substitute for professional medical care. Always follow your healthcare professional's instructions.                Follow-ups after your visit        Who to contact     If you have questions or need follow up information about today's clinic visit or your schedule please contact [facility] directly at [phone].  Normal or non-critical lab and imaging results will be communicated to you by MyChart, letter or phone within 4 business days after the clinic has received the results. If you do not hear from us within 7 days, please contact the clinic through MyChart or phone. If you have a critical or abnormal lab result, we will notify you by phone as soon as possible.  Submit refill requests through [employer] or call your pharmacy and they will forward the refill request to us. Please allow 3 business days for your refill to be completed.          Additional Information About Your Visit        [employer] Information     [employer] is an electronic gateway that provides easy, online access to your medical records. With [employer], you can request a clinic appointment, read your test results, renew a prescription or communicate with your care team.     To sign up for [employer] visit the website at [email or web address]   You will be asked to enter the access code listed below, as well as some personal information. Please follow the directions to create your username and password.     Your access code is: [email or web address]  Expires: 10/30/2018  4:51 PM     Your access code will  in 90 days. If you need help or a new code, please contact your [facility] or call [phone] for assistance.        Care EveryWhere ID     This is your Care EveryWhere ID. This could be used by other organizations to access your [place]  medical records  [id number]        Your Vitals Were     Pulse Temperature Pulse Oximetry BMI (Body Mass Index)          67 98  F (36.7  C) (Oral) 97% 42.14 kg/m2         Blood Pressure from Last 3 Encounters:   08/01/18 (!) 145/91   03/05/18 122/77   02/21/18 121/77    Weight from Last 3 Encounters:   08/01/18 306 lb 6.4 oz (139 kg)   02/20/18 280 lb (127 kg)   02/19/18 281 lb (127.5 kg)              Today, you had the following     No orders found for display       Primary Care Provider Office Phone # Fax #    Joleen Chappell Rudolphsherry, APRN [facility] [phone] [address] [facility] [address]        Equal Access to Services     EDDIE PASCUAL : Hadii charleen petito Soelizabeth, waaxda luqadaha, qaybta kaalmada adeegyada, samantha hernandez . So [facility] [phone].    ATENCIÓN: Si habla español, tiene a barrett disposición servicios gratuitos de asistencia lingüística. Dorothy al [phone].    We comply with applicable federal civil rights laws and [place] laws. We do not discriminate on the basis of race, color, national origin, age, disability, sex, sexual orientation, or gender identity.            Thank you!     Thank you for choosing [facility]  for your care. Our goal is always to provide you with excellent care. Hearing back from our patients is one way we can continue to improve our services. Please take a few minutes to complete the written survey that you may receive in the mail after your visit with us. Thank you!             Your Updated Medication List - Protect others around you: Learn how to safely use, store and throw away your medicines at www.disposemymeds.org.          This list is accurate as of 8/1/18  4:51 PM.  Always use your most recent med list.                   Brand Name Dispense Instructions for use Diagnosis    aspirin 81 MG tablet     1 tablet    Take 1 tablet by mouth daily.    HTN (hypertension)       hydrochlorothiazide 25 MG  "tablet    HYDRODIURIL    90 tablet    TAKE 1 TABLET BY MOUTH ONCE DAILY    Benign essential hypertension       HYDROcodone-acetaminophen 5-325 MG per tablet    NORCO    30 tablet    Take 1-2 tablets by mouth every 4 hours as needed for moderate to severe pain (Moderate to Severe Pain)    Post-op pain       losartan 100 MG tablet    COZAAR    90 tablet    TAKE 1 TABLET BY MOUTH ONCE DAILY    Benign essential hypertension       tamsulosin 0.4 MG capsule    FLOMAX    90 capsule    TAKE 1 CAPSULE BY MOUTH ONCE A DAY    Urinary frequency       UNABLE TO FIND      Take 5 tablets by mouth 2 times daily \"Isogenic vitamins\"          "

## 2018-08-01 NOTE — PATIENT INSTRUCTIONS
Call to schedule for physical, fasting labs in 2-3 weeks and for BP recheck  Try OTC artificial tears on the left eye for 2-3 days  Do not rub the eyes, make sure to take the BP medications  Subconjunctival Hemorrhage    A subconjunctival hemorrhage is when a blood vessel breaks open in the white of the eye. It causes a bright red patch in the white of the eye. It is similar to a bruise on the skin. This type of hemorrhage is common. It can look quite alarming, but it is usually harmless.  Understanding the conjunctiva  The conjunctiva is the thin layer that covers the inside of the eyelids and the surface of the eye. It has many tiny blood vessels that bring oxygen and nutrients to the eye. The sclera is the white part of the eye that lies beneath the conjunctiva. Sometimes a blood vessel in the conjunctiva breaks open and bleeds. The blood then collects under the conjunctiva and turns part of the eye red. Over several weeks, your body then absorbs the blood.  What causes subconjunctival hemorrhage?  In many cases the cause isn t known. But some health conditions may make it more likely. These include:    Eye injury    Eye surgery    High blood pressure    Inflammation of the conjunctiva    Contact lens use    Diabetes    Arteriosclerosis    Tumor of the conjunctiva    Diseases that affect blood clotting    Violent sneezing, coughing, or vomiting    Certain medicines that can increase bleeding, such as aspirin    Pushing hard during childbirth    Straining during constipation  Symptoms of subconjunctival hemorrhage  The main symptom is a red patch on the eye. You may notice it after waking up in the morning. In most cases just one eye will have a hemorrhage. It usually happens once and then goes away. But some health conditions may cause repeat hemorrhages. You may feel like you have something in your eye, but this is not common. The hemorrhage shouldn t affect your eyesight or cause any pain. If you do have pain,  you may have another type of problem with your eye.  Diagnosing subconjunctival hemorrhage  Your healthcare provider will ask about your health history. You may have a physical exam. This includes a basic eye exam. Your provider will make sure you don t have other causes of red eye that may need other treatment.  You will need to see an eye doctor (ophthalmologist) if you have had an eye injury. This doctor might use a special lighted microscope to look closely at your eye. This helps show the doctor if the injury hurt the eye itself and not just its outer layer.  If this is not your first subconjunctival hemorrhage, your doctor may need to find the cause. For example, you may need blood tests to check for a blood clotting disorder.  Treatment for subconjunctival hemorrhage  In most cases you will not need treatment. The red patch will usually go away on its own in a few weeks. It will turn from red to brown and then yellow. There are no treatments to speed up this process. Your doctor may suggest you use a warm compress and artificial tears eye drops to help relieve some of the redness.  If your subconjunctival hemorrhage was caused by a health condition, that condition will be treated. For example, you may need a blood pressure medicine to treat high blood pressure.  When to call your healthcare provider  Call your healthcare provider right away if you have any of these:    Hemorrhage that doesn t go away in 2 to 3 weeks    Eye pain    Loss of eyesight    Another subconjunctival hemorrhage    Date Last Reviewed: 2/1/2017 2000-2017 [employer]. [address] [place] [address], [place] [address]. All rights reserved. This information is not intended as a substitute for professional medical care. Always follow your healthcare professional's instructions.

## 2018-08-05 DIAGNOSIS — Z13.0 SCREENING FOR DEFICIENCY ANEMIA: ICD-10-CM

## 2018-08-05 DIAGNOSIS — Z13.1 SCREENING FOR DIABETES MELLITUS (DM): ICD-10-CM

## 2018-08-05 DIAGNOSIS — Z13.29 SCREENING FOR THYROID DISORDER: ICD-10-CM

## 2018-08-05 DIAGNOSIS — Z00.00 ROUTINE GENERAL MEDICAL EXAMINATION AT A HEALTH CARE FACILITY: Primary | ICD-10-CM

## 2018-08-05 DIAGNOSIS — Z13.6 CARDIOVASCULAR SCREENING; LDL GOAL LESS THAN 160: ICD-10-CM

## 2018-08-05 DIAGNOSIS — Z12.5 PROSTATE CANCER SCREENING: ICD-10-CM

## 2018-08-05 DIAGNOSIS — I10 BENIGN ESSENTIAL HYPERTENSION: ICD-10-CM

## 2018-08-14 ENCOUNTER — TELEPHONE (OUTPATIENT)
Dept: FAMILY MEDICINE | Facility: CLINIC | Age: 58
End: 2018-08-14

## 2018-08-14 ENCOUNTER — MEDICAL CORRESPONDENCE (OUTPATIENT)
Dept: HEALTH INFORMATION MANAGEMENT | Facility: CLINIC | Age: 58
End: 2018-08-14

## 2018-08-17 ENCOUNTER — OFFICE VISIT (OUTPATIENT)
Dept: PEDIATRICS | Facility: CLINIC | Age: 58
End: 2018-08-17
Payer: COMMERCIAL

## 2018-08-17 VITALS
HEIGHT: 71 IN | DIASTOLIC BLOOD PRESSURE: 84 MMHG | TEMPERATURE: 97.3 F | SYSTOLIC BLOOD PRESSURE: 136 MMHG | OXYGEN SATURATION: 98 % | WEIGHT: 303.4 LBS | BODY MASS INDEX: 42.48 KG/M2 | HEART RATE: 72 BPM

## 2018-08-17 DIAGNOSIS — Z13.1 SCREENING FOR DIABETES MELLITUS (DM): ICD-10-CM

## 2018-08-17 DIAGNOSIS — Z00.00 ROUTINE GENERAL MEDICAL EXAMINATION AT A HEALTH CARE FACILITY: Primary | ICD-10-CM

## 2018-08-17 DIAGNOSIS — Z23 NEED FOR SHINGLES VACCINE: ICD-10-CM

## 2018-08-17 DIAGNOSIS — E29.1 TESTICULAR HYPOFUNCTION: ICD-10-CM

## 2018-08-17 DIAGNOSIS — I10 BENIGN ESSENTIAL HYPERTENSION: ICD-10-CM

## 2018-08-17 DIAGNOSIS — Z12.5 PROSTATE CANCER SCREENING: ICD-10-CM

## 2018-08-17 DIAGNOSIS — Z13.29 SCREENING FOR THYROID DISORDER: ICD-10-CM

## 2018-08-17 DIAGNOSIS — R35.0 URINARY FREQUENCY: ICD-10-CM

## 2018-08-17 DIAGNOSIS — Z11.59 NEED FOR HEPATITIS C SCREENING TEST: ICD-10-CM

## 2018-08-17 DIAGNOSIS — N48.89 SORE OF PENIS: ICD-10-CM

## 2018-08-17 DIAGNOSIS — Z13.6 CARDIOVASCULAR SCREENING; LDL GOAL LESS THAN 160: ICD-10-CM

## 2018-08-17 DIAGNOSIS — E66.01 MORBID OBESITY WITH BMI OF 40.0-44.9, ADULT (H): ICD-10-CM

## 2018-08-17 DIAGNOSIS — Z00.00 ROUTINE GENERAL MEDICAL EXAMINATION AT A HEALTH CARE FACILITY: ICD-10-CM

## 2018-08-17 DIAGNOSIS — Z11.4 SCREENING FOR HUMAN IMMUNODEFICIENCY VIRUS: ICD-10-CM

## 2018-08-17 DIAGNOSIS — Z13.0 SCREENING FOR DEFICIENCY ANEMIA: ICD-10-CM

## 2018-08-17 LAB
ALBUMIN SERPL-MCNC: 4 G/DL (ref 3.4–5)
ALP SERPL-CCNC: 59 U/L (ref 40–150)
ALT SERPL W P-5'-P-CCNC: 27 U/L (ref 0–70)
ANION GAP SERPL CALCULATED.3IONS-SCNC: 6 MMOL/L (ref 3–14)
AST SERPL W P-5'-P-CCNC: 22 U/L (ref 0–45)
BASOPHILS # BLD AUTO: 0 10E9/L (ref 0–0.2)
BASOPHILS NFR BLD AUTO: 0.7 %
BILIRUB SERPL-MCNC: 0.6 MG/DL (ref 0.2–1.3)
BUN SERPL-MCNC: 19 MG/DL (ref 7–30)
CALCIUM SERPL-MCNC: 8.9 MG/DL (ref 8.5–10.1)
CHLORIDE SERPL-SCNC: 105 MMOL/L (ref 94–109)
CHOLEST SERPL-MCNC: 185 MG/DL
CO2 SERPL-SCNC: 30 MMOL/L (ref 20–32)
CREAT SERPL-MCNC: 0.95 MG/DL (ref 0.66–1.25)
CREAT UR-MCNC: 86 MG/DL
DIFFERENTIAL METHOD BLD: NORMAL
EOSINOPHIL # BLD AUTO: 0.3 10E9/L (ref 0–0.7)
EOSINOPHIL NFR BLD AUTO: 4.7 %
ERYTHROCYTE [DISTWIDTH] IN BLOOD BY AUTOMATED COUNT: 12.9 % (ref 10–15)
GFR SERPL CREATININE-BSD FRML MDRD: 82 ML/MIN/1.7M2
GLUCOSE SERPL-MCNC: 96 MG/DL (ref 70–99)
HCT VFR BLD AUTO: 45 % (ref 40–53)
HDLC SERPL-MCNC: 42 MG/DL
HGB BLD-MCNC: 15.2 G/DL (ref 13.3–17.7)
IMM GRANULOCYTES # BLD: 0 10E9/L (ref 0–0.4)
IMM GRANULOCYTES NFR BLD: 0.2 %
LDLC SERPL CALC-MCNC: 108 MG/DL
LYMPHOCYTES # BLD AUTO: 1.1 10E9/L (ref 0.8–5.3)
LYMPHOCYTES NFR BLD AUTO: 19.7 %
MCH RBC QN AUTO: 28.4 PG (ref 26.5–33)
MCHC RBC AUTO-ENTMCNC: 33.8 G/DL (ref 31.5–36.5)
MCV RBC AUTO: 84 FL (ref 78–100)
MICROALBUMIN UR-MCNC: <5 MG/L
MICROALBUMIN/CREAT UR: NORMAL MG/G CR (ref 0–17)
MONOCYTES # BLD AUTO: 0.4 10E9/L (ref 0–1.3)
MONOCYTES NFR BLD AUTO: 7.1 %
NEUTROPHILS # BLD AUTO: 3.9 10E9/L (ref 1.6–8.3)
NEUTROPHILS NFR BLD AUTO: 67.6 %
NONHDLC SERPL-MCNC: 143 MG/DL
PLATELET # BLD AUTO: 213 10E9/L (ref 150–450)
POTASSIUM SERPL-SCNC: 3.5 MMOL/L (ref 3.4–5.3)
PROT SERPL-MCNC: 7.5 G/DL (ref 6.8–8.8)
PSA SERPL-ACNC: 0.5 UG/L (ref 0–4)
RBC # BLD AUTO: 5.35 10E12/L (ref 4.4–5.9)
SODIUM SERPL-SCNC: 141 MMOL/L (ref 133–144)
TRIGL SERPL-MCNC: 175 MG/DL
TSH SERPL DL<=0.005 MIU/L-ACNC: 2.01 MU/L (ref 0.4–4)
WBC # BLD AUTO: 5.7 10E9/L (ref 4–11)

## 2018-08-17 PROCEDURE — 87389 HIV-1 AG W/HIV-1&-2 AB AG IA: CPT | Performed by: FAMILY MEDICINE

## 2018-08-17 PROCEDURE — G0103 PSA SCREENING: HCPCS | Performed by: FAMILY MEDICINE

## 2018-08-17 PROCEDURE — 80061 LIPID PANEL: CPT | Performed by: FAMILY MEDICINE

## 2018-08-17 PROCEDURE — 85025 COMPLETE CBC W/AUTO DIFF WBC: CPT | Performed by: FAMILY MEDICINE

## 2018-08-17 PROCEDURE — 36415 COLL VENOUS BLD VENIPUNCTURE: CPT | Performed by: FAMILY MEDICINE

## 2018-08-17 PROCEDURE — 80053 COMPREHEN METABOLIC PANEL: CPT | Performed by: FAMILY MEDICINE

## 2018-08-17 PROCEDURE — 99396 PREV VISIT EST AGE 40-64: CPT | Performed by: FAMILY MEDICINE

## 2018-08-17 PROCEDURE — 86803 HEPATITIS C AB TEST: CPT | Performed by: FAMILY MEDICINE

## 2018-08-17 PROCEDURE — 82043 UR ALBUMIN QUANTITATIVE: CPT | Performed by: FAMILY MEDICINE

## 2018-08-17 PROCEDURE — 84443 ASSAY THYROID STIM HORMONE: CPT | Performed by: FAMILY MEDICINE

## 2018-08-17 RX ORDER — TAMSULOSIN HYDROCHLORIDE 0.4 MG/1
CAPSULE ORAL
Qty: 90 CAPSULE | Refills: 3 | Status: SHIPPED | OUTPATIENT
Start: 2018-08-17 | End: 2019-07-26

## 2018-08-17 RX ORDER — HYDROCHLOROTHIAZIDE 25 MG/1
25 TABLET ORAL DAILY
Qty: 90 TABLET | Refills: 1 | Status: SHIPPED | OUTPATIENT
Start: 2018-08-17 | End: 2019-02-09

## 2018-08-17 RX ORDER — LOSARTAN POTASSIUM 100 MG/1
100 TABLET ORAL DAILY
Qty: 90 TABLET | Refills: 1 | Status: SHIPPED | OUTPATIENT
Start: 2018-08-17 | End: 2019-02-09

## 2018-08-17 ASSESSMENT — PAIN SCALES - GENERAL: PAINLEVEL: NO PAIN (0)

## 2018-08-17 NOTE — LETTER
August 20, 2018      [facility]  [address] [facility] [address] [facility] [address]        Dear [facility],     Your test results for HIV screening and hepatitis C screening are both negative, this is good and reassuring.    Resulted Orders   HIV Antigen Antibody Combo   Result Value Ref Range    HIV Antigen Antibody Combo Nonreactive NR^Nonreactive          Comment:      HIV-1 p24 Ag & HIV-1/HIV-2 Ab Not Detected   Hepatitis C antibody   Result Value Ref Range    Hepatitis C Antibody Nonreactive NR^Nonreactive      Comment:      Assay performance characteristics have not been established for newborns,   infants, and children          Let me know if you have any questions. Take care.    Uli Owens MD

## 2018-08-17 NOTE — PATIENT INSTRUCTIONS
Schedule for recheck in 6 months      Preventive Health Recommendations  Male Ages 50 - 64    Yearly exam:             See your health care provider every year in order to  o   Review health changes.   o   Discuss preventive care.    o   Review your medicines if your doctor has prescribed any.     Have a cholesterol test every 5 years, or more frequently if you are at risk for high cholesterol/heart disease.     Have a diabetes test (fasting glucose) every three years. If you are at risk for diabetes, you should have this test more often.     Have a colonoscopy at age 50, or have a yearly FIT test (stool test). These exams will check for colon cancer.      Talk with your health care provider about whether or not a prostate cancer screening test (PSA) is right for you.    You should be tested each year for STDs (sexually transmitted diseases), if you re at risk.     Shots: Get a flu shot each year. Get a tetanus shot every 10 years.     Nutrition:    Eat at least 5 servings of fruits and vegetables daily.     Eat whole-grain bread, whole-wheat pasta and brown rice instead of white grains and rice.     Get adequate Calcium and Vitamin D.     Lifestyle    Exercise for at least 150 minutes a week (30 minutes a day, 5 days a week). This will help you control your weight and prevent disease.     Limit alcohol to one drink per day.     No smoking.     Wear sunscreen to prevent skin cancer.     See your dentist every six months for an exam and cleaning.     See your eye doctor every 1 to 2 years.

## 2018-08-17 NOTE — MR AVS SNAPSHOT
After Visit Summary   8/17/2018    [facility]    MRN: [id number]           Patient Information     Date Of Birth          1960        Visit Information        Provider Department      8/17/2018 10:10 AM Uli Owens MD [facility]        Today's Diagnoses     Routine general medical examination at a health care facility    -  1    Urinary frequency        Benign essential hypertension        Screening for human immunodeficiency virus        Need for hepatitis C screening test        CARDIOVASCULAR SCREENING; LDL GOAL LESS THAN 160        Need for shingles vaccine        Sore of penis        Testicular hypofunction        Morbid obesity with BMI of 40.0-44.9, adult (H)          Care Instructions    Schedule for recheck in 6 months      Preventive Health Recommendations  Male Ages 50 - 64    Yearly exam:             See your health care provider every year in order to  o   Review health changes.   o   Discuss preventive care.    o   Review your medicines if your doctor has prescribed any.     Have a cholesterol test every 5 years, or more frequently if you are at risk for high cholesterol/heart disease.     Have a diabetes test (fasting glucose) every three years. If you are at risk for diabetes, you should have this test more often.     Have a colonoscopy at age 50, or have a yearly FIT test (stool test). These exams will check for colon cancer.      Talk with your health care provider about whether or not a prostate cancer screening test (PSA) is right for you.    You should be tested each year for STDs (sexually transmitted diseases), if you re at risk.     Shots: Get a flu shot each year. Get a tetanus shot every 10 years.     Nutrition:    Eat at least 5 servings of fruits and vegetables daily.     Eat whole-grain bread, whole-wheat pasta and brown rice instead of white grains and rice.     Get adequate Calcium and Vitamin D.     Lifestyle    Exercise for at least  150 minutes a week (30 minutes a day, 5 days a week). This will help you control your weight and prevent disease.     Limit alcohol to one drink per day.     No smoking.     Wear sunscreen to prevent skin cancer.     See your dentist every six months for an exam and cleaning.     See your eye doctor every 1 to 2 years.            Follow-ups after your visit        Follow-up notes from your care team     Return in about 6 months (around 2019) for Medication check.      Who to contact     If you have questions or need follow up information about today's clinic visit or your schedule please contact [facility] directly at [phone].  Normal or non-critical lab and imaging results will be communicated to you by Techtium[employer], letter or phone within 4 business days after the clinic has received the results. If you do not hear from us within 7 days, please contact the clinic through Techtium[employer] or phone. If you have a critical or abnormal lab result, we will notify you by phone as soon as possible.  Submit refill requests through [employer] or call your pharmacy and they will forward the refill request to us. Please allow 3 business days for your refill to be completed.          Additional Information About Your Visit        [employer] Information     [employer] is an electronic gateway that provides easy, online access to your medical records. With [employer], you can request a clinic appointment, read your test results, renew a prescription or communicate with your care team.     To sign up for [employer] visit the website at [email or web address]   You will be asked to enter the access code listed below, as well as some personal information. Please follow the directions to create your username and password.     Your access code is: [email or web address]  Expires: 10/30/2018  4:51 PM     Your access code will  in 90 days. If you need help or a new code, please contact your [facility] or  "call [phone] for assistance.        Care EveryWhere ID     This is your Care EveryWhere ID. This could be used by other organizations to access your [facility] medical records  [id number]        Your Vitals Were     Pulse Temperature Height Pulse Oximetry BMI (Body Mass Index)       72 97.3  F (36.3  C) (Oral) 5' 10.5\" (1.791 m) 98% 42.92 kg/m2        Blood Pressure from Last 3 Encounters:   08/17/18 136/84   08/01/18 137/86   03/05/18 122/77    Weight from Last 3 Encounters:   08/17/18 303 lb 6.4 oz (137.6 kg)   08/01/18 306 lb 6.4 oz (139 kg)   02/20/18 280 lb (127 kg)              We Performed the Following     Hepatitis C antibody     HIV Antigen Antibody Combo          Today's Medication Changes          These changes are accurate as of 8/17/18 11:59 PM.  If you have any questions, ask your nurse or doctor.               Start taking these medicines.        Dose/Directions    zoster vaccine recombinant adjuvanted injection   Commonly known as:  SHINGRIX   Used for:  Routine general medical examination at a health care facility, Need for shingles vaccine   Started by:  Uli Owens MD        Dose:  1 each   Inject 0.5 mLs into the muscle once for 1 dose   Quantity:  0.5 mL   Refills:  0         These medicines have changed or have updated prescriptions.        Dose/Directions    hydrochlorothiazide 25 MG tablet   Commonly known as:  HYDRODIURIL   This may have changed:  See the new instructions.   Used for:  Benign essential hypertension   Changed by:  Uli Owens MD        Dose:  25 mg   Take 1 tablet (25 mg) by mouth daily   Quantity:  90 tablet   Refills:  1       losartan 100 MG tablet   Commonly known as:  COZAAR   This may have changed:  See the new instructions.   Used for:  Benign essential hypertension   Changed by:  Uli Owens MD        Dose:  100 mg   Take 1 tablet (100 mg) by mouth daily   Quantity:  90 tablet   Refills:  1            Where to get your medicines    "   These medications were sent to [id number] - [place], [place] - [address]  [address], [place] [address]     Phone:  [phone]     hydrochlorothiazide 25 MG tablet    losartan 100 MG tablet    tamsulosin 0.4 MG capsule         Some of these will need a paper prescription and others can be bought over the counter.  Ask your nurse if you have questions.     Bring a paper prescription for each of these medications     zoster vaccine recombinant adjuvanted injection                Primary Care Provider Office Phone # Fax #    Joleen Geiger Ta Garcia, [place]EN [phone] [address] [address]        Equal Access to Services     EDDIE PASCUAL : Hadii charleen Berger, waaxda luqadaha, qaybta kaalmada celestine, samantha bhatti. So [facility] [phone].    ATENCIÓN: Si habla español, tiene a barrett disposición servicios gratuitos de asistencia lingüística. Llame al [phone].    We comply with applicable federal civil rights laws and [place] laws. We do not discriminate on the basis of race, color, national origin, age, disability, sex, sexual orientation, or gender identity.            Thank you!     Thank you for choosing [facility]  for your care. Our goal is always to provide you with excellent care. Hearing back from our patients is one way we can continue to improve our services. Please take a few minutes to complete the written survey that you may receive in the mail after your visit with us. Thank you!             Your Updated Medication List - Protect others around you: Learn how to safely use, store and throw away your medicines at www.disposemymeds.org.          This list is accurate as of 8/17/18 11:59 PM.  Always use your most recent med list.                   Brand Name Dispense Instructions for use Diagnosis    aspirin 81 MG tablet     1 tablet    Take 1 tablet by mouth daily.    HTN  "(hypertension)       hydrochlorothiazide 25 MG tablet    HYDRODIURIL    90 tablet    Take 1 tablet (25 mg) by mouth daily    Benign essential hypertension       HYDROcodone-acetaminophen 5-325 MG per tablet    NORCO    30 tablet    Take 1-2 tablets by mouth every 4 hours as needed for moderate to severe pain (Moderate to Severe Pain)    Post-op pain       losartan 100 MG tablet    COZAAR    90 tablet    Take 1 tablet (100 mg) by mouth daily    Benign essential hypertension       tamsulosin 0.4 MG capsule    FLOMAX    90 capsule    TAKE 1 CAPSULE BY MOUTH ONCE A DAY    Urinary frequency       UNABLE TO FIND      Take 5 tablets by mouth 2 times daily \"Isogenic vitamins\"        zoster vaccine recombinant adjuvanted injection    SHINGRIX    0.5 mL    Inject 0.5 mLs into the muscle once for 1 dose    Routine general medical examination at a health care facility, Need for shingles vaccine         "

## 2018-08-17 NOTE — PROGRESS NOTES
SUBJECTIVE:   CC: [place] P[place] is an 58 year old male who presents for preventative health visit.     Healthy Habits:    Do you get at least three servings of calcium containing foods daily (dairy, green leafy vegetables, etc.)? yes    Amount of exercise or daily activities, outside of work: 5 day(s) per week    Problems taking medications regularly Yes occasional missing doses    Medication side effects: No    Have you had an eye exam in the past two years? yes    Do you see a dentist twice per year? yes    Do you have sleep apnea, excessive snoring or daytime drowsiness?yes       Hypertension Follow-up      Outpatient blood pressures are not being checked.    Low Salt Diet: no added salt      Today's PHQ-2 Score:   PHQ-2 ( 1999 Pfizer) 5/12/2017 7/19/2016   Q1: Little interest or pleasure in doing things 0 0   Q2: Feeling down, depressed or hopeless 0 0   PHQ-2 Score 0 0       Abuse: Current or Past(Physical, Sexual or Emotional)- No  Do you feel safe in your environment - Yes    Social History   Substance Use Topics     Smoking status: Never Smoker     Smokeless tobacco: Never Used     Alcohol use No      If you drink alcohol do you typically have >3 drinks per day or >7 drinks per week? No                      Last PSA:   PSA   Date Value Ref Range Status   08/17/2018 0.50 0 - 4 ug/L Final     Comment:     Assay Method:  Chemiluminescence using Siemens Vista analyzer       Reviewed orders with patient. Reviewed health maintenance and updated orders accordingly - Yes  Labs reviewed in EPIC  BP Readings from Last 3 Encounters:   08/17/18 136/84   08/01/18 137/86   03/05/18 122/77    Wt Readings from Last 3 Encounters:   08/17/18 303 lb 6.4 oz (137.6 kg)   08/01/18 306 lb 6.4 oz (139 kg)   02/20/18 280 lb (127 kg)                  Patient Active Problem List   Diagnosis     DJD LEFT HIP [715.15]     Morbid obesity (H)     Generalized anxiety disorder     Urinary frequency     Benign essential hypertension      "Testicular hypofunction     CARDIOVASCULAR SCREENING; LDL GOAL LESS THAN 160     Past Surgical History:   Procedure Laterality Date     COLONOSCOPY  7/2006    repeat in 10 years     COLONOSCOPY N/A 12/12/2016    Procedure: COLONOSCOPY;  Surgeon: Delonte Miller MD;  Location: [facility]     CREATE EARDRUM OPENING,LOCAL ANESTH  age 15     HC ECP WITH CATARACT SURGERY       LAPAROSCOPIC CHOLECYSTECTOMY  11/8/2012    Procedure: LAPAROSCOPIC CHOLECYSTECTOMY;  Laparoscopic Cholecystectomy & repair of umbilical hernia;  Surgeon: Delonte Miller MD;  Location: [facility]     LASIK BILATERAL  2011     VASECTOMY Bilateral 2/21/2018    Procedure: VASECTOMY;  Bilateral vasectomy;  Surgeon: Kavon Everett MD;  Location:  OR       Social History   Substance Use Topics     Smoking status: Never Smoker     Smokeless tobacco: Never Used     Alcohol use No     Family History   Problem Relation Age of Onset     Hypertension Father      C.A.D. Father      Thyroid Disease Father      Respiratory Mother      asthma     Cancer Mother      Alcohol/Drug Maternal Grandfather      Cerebrovascular Disease Paternal Grandfather      Alcohol/Drug Paternal Grandfather      Hypertension Brother      HEART DISEASE Brother          Current Outpatient Prescriptions   Medication Sig Dispense Refill     aspirin 81 MG tablet Take 1 tablet by mouth daily. 1 tablet 3     hydrochlorothiazide (HYDRODIURIL) 25 MG tablet Take 1 tablet (25 mg) by mouth daily 90 tablet 1     HYDROcodone-acetaminophen (NORCO) 5-325 MG per tablet Take 1-2 tablets by mouth every 4 hours as needed for moderate to severe pain (Moderate to Severe Pain) 30 tablet 0     losartan (COZAAR) 100 MG tablet Take 1 tablet (100 mg) by mouth daily 90 tablet 1     tamsulosin (FLOMAX) 0.4 MG capsule TAKE 1 CAPSULE BY MOUTH ONCE A DAY 90 capsule 3     UNABLE TO FIND Take 5 tablets by mouth 2 times daily \"Isogenic vitamins\"       zoster vaccine recombinant adjuvanted (SHINGRIX) injection Inject 0.5 mLs " into the muscle once for 1 dose 0.5 mL 0     [DISCONTINUED] hydrochlorothiazide (HYDRODIURIL) 25 MG tablet TAKE 1 TABLET BY MOUTH ONCE DAILY 90 tablet 1     [DISCONTINUED] losartan (COZAAR) 100 MG tablet TAKE 1 TABLET BY MOUTH ONCE DAILY 90 tablet 1     Allergies   Allergen Reactions     Lisinopril Cough     Recent Labs   Lab Test  08/17/18   0913  10/17/17   1140  05/12/17   0947   12/18/15   0855   04/20/14   0210  06/29/13   0724  09/17/12   1012   LDL  108*   --   106*   --   111*   < >   --   105   --    HDL  42   --   48   --   43   < >   --   36*   --    TRIG  175*   --   91   --   92   < >   --   217*   --    ALT  27   --    --    --    --    --   56   --   41   CR  0.95  1.02  0.92   < >   --    --   0.86  0.87  0.94   GFRESTIMATED  82  76  86   < >   --    --   >90  >90  85   GFRESTBLACK  >90  >90  >90  African American GFR Calc     < >   --    --   >90  >90  >90   POTASSIUM  3.5  3.4  4.1   < >   --    --   3.8  4.1  3.7   TSH  2.01   --    --    --    --    --    --   2.76   --     < > = values in this interval not displayed.        Reviewed and updated as needed this visit by clinical staff  Tobacco  Allergies  Meds  Med Hx  Surg Hx  Fam Hx  Soc Hx        Reviewed and updated as needed this visit by Provider          Past Medical History:   Diagnosis Date     Asthma      BPH (benign prostatic hyperplasia)      Hypercholesteremia      Hypertension      Mild intermittent asthma 8/15/2006    Patient states he does not have asthma.  Doesn't use albuterol ever.         Past Surgical History:   Procedure Laterality Date     COLONOSCOPY  7/2006    repeat in 10 years     COLONOSCOPY N/A 12/12/2016    Procedure: COLONOSCOPY;  Surgeon: Delonte Miller MD;  Location: [facility]     CREATE EARDRUM OPENING,LOCAL ANESTH  age 15     HC ECP WITH CATARACT SURGERY       LAPAROSCOPIC CHOLECYSTECTOMY  11/8/2012    Procedure: LAPAROSCOPIC CHOLECYSTECTOMY;  Laparoscopic Cholecystectomy & repair of umbilical hernia;  Surgeon:  "Delonte Miller MD;  Location: [facility]     [facility] BILATERAL  2011     VASECTOMY Bilateral 2/21/2018    Procedure: VASECTOMY;  Bilateral vasectomy;  Surgeon: Kavon Everett MD;  Location:  OR            ROS:  CONSTITUTIONAL: NEGATIVE for fever, chills, change in weight  INTEGUMENTARY/SKIN: NEGATIVE for worrisome rashes, moles or lesions  EYES: NEGATIVE for vision changes or irritation  ENT: NEGATIVE for ear, mouth and throat problems  RESP: NEGATIVE for significant cough or SOB  CV: NEGATIVE for chest pain, palpitations or peripheral edema  CV: Hx HTN  GI: NEGATIVE for nausea, abdominal pain, heartburn, or change in bowel habits   male: Painless red sore on the penis for the past 1 week  MUSCULOSKELETAL: NEGATIVE for significant arthralgias or myalgia  NEURO: NEGATIVE for weakness, dizziness or paresthesias  ENDOCRINE: NEGATIVE for temperature intolerance, skin/hair changes  HEME/ALLERGY/IMMUNE: NEGATIVE for bleeding problems  PSYCHIATRIC: NEGATIVE for changes in mood or affect    OBJECTIVE:   /84 (BP Location: Right arm, Patient Position: Sitting, Cuff Size: Adult Large)  Pulse 72  Temp 97.3  F (36.3  C) (Oral)  Ht 5' 10.5\" (1.791 m)  Wt 303 lb 6.4 oz (137.6 kg)  SpO2 98%  BMI 42.92 kg/m2  EXAM:  GENERAL: healthy, alert and no distress  EYES: Eyes grossly normal to inspection, PERRL and conjunctivae and sclerae normal  HENT: ear canals and TM's normal, nose and mouth without ulcers or lesions  NECK: no adenopathy, no asymmetry, masses, or scars and thyroid normal to palpation  RESP: lungs clear to auscultation - no rales, rhonchi or wheezes  CV: regular rate and rhythm, normal S1 S2, no S3 or S4, no murmur, click or rub, no peripheral edema and peripheral pulses strong  ABDOMEN: soft, nontender, no hepatosplenomegaly, no masses and bowel sounds normal   (male): testicles normal without atrophy or masses, no hernias, penis normal without urethral discharge and 1mm, erythematous, irritated macular " spots at the junction of glands and body of penis with no concern for tenderness, pain, drainage, bleeding  RECTAL: Deferred per patient  MS: no gross musculoskeletal defects noted, no edema  SKIN: no suspicious lesions or rashes  NEURO: Normal strength and tone, mentation intact and speech normal  PSYCH: mentation appears normal, affect normal/bright    Diagnostic Test Results:  Results for orders placed or performed in visit on 08/17/18 (from the past 24 hour(s))   CBC with platelets differential   Result Value Ref Range    WBC 5.7 4.0 - 11.0 10e9/L    RBC Count 5.35 4.4 - 5.9 10e12/L    Hemoglobin 15.2 13.3 - 17.7 g/dL    Hematocrit 45.0 40.0 - 53.0 %    MCV 84 78 - 100 fl    MCH 28.4 26.5 - 33.0 pg    MCHC 33.8 31.5 - 36.5 g/dL    RDW 12.9 10.0 - 15.0 %    Platelet Count 213 150 - 450 10e9/L    Diff Method Automated Method     % Neutrophils 67.6 %    % Lymphocytes 19.7 %    % Monocytes 7.1 %    % Eosinophils 4.7 %    % Basophils 0.7 %    % Immature Granulocytes 0.2 %    Absolute Neutrophil 3.9 1.6 - 8.3 10e9/L    Absolute Lymphocytes 1.1 0.8 - 5.3 10e9/L    Absolute Monocytes 0.4 0.0 - 1.3 10e9/L    Absolute Eosinophils 0.3 0.0 - 0.7 10e9/L    Absolute Basophils 0.0 0.0 - 0.2 10e9/L    Abs Immature Granulocytes 0.0 0 - 0.4 10e9/L   **Comprehensive metabolic panel FUTURE anytime   Result Value Ref Range    Sodium 141 133 - 144 mmol/L    Potassium 3.5 3.4 - 5.3 mmol/L    Chloride 105 94 - 109 mmol/L    Carbon Dioxide 30 20 - 32 mmol/L    Anion Gap 6 3 - 14 mmol/L    Glucose 96 70 - 99 mg/dL    Urea Nitrogen 19 7 - 30 mg/dL    Creatinine 0.95 0.66 - 1.25 mg/dL    GFR Estimate 82 >60 mL/min/1.7m2    GFR Estimate If Black >90 >60 mL/min/1.7m2    Calcium 8.9 8.5 - 10.1 mg/dL    Bilirubin Total 0.6 0.2 - 1.3 mg/dL    Albumin 4.0 3.4 - 5.0 g/dL    Protein Total 7.5 6.8 - 8.8 g/dL    Alkaline Phosphatase 59 40 - 150 U/L    ALT 27 0 - 70 U/L    AST 22 0 - 45 U/L   Lipid panel reflex to direct LDL Fasting   Result Value  Ref Range    Cholesterol 185 <200 mg/dL    Triglycerides 175 (H) <150 mg/dL    HDL Cholesterol 42 >39 mg/dL    LDL Cholesterol Calculated 108 (H) <100 mg/dL    Non HDL Cholesterol 143 (H) <130 mg/dL   Prostate spec antigen screen   Result Value Ref Range    PSA 0.50 0 - 4 ug/L   **TSH with free T4 reflex FUTURE 2mo   Result Value Ref Range    TSH 2.01 0.40 - 4.00 mU/L   Albumin Random Urine Quantitative with Creat Ratio   Result Value Ref Range    Creatinine Urine 86 mg/dL    Albumin Urine mg/L <5 mg/L    Albumin Urine mg/g Cr Unable to calculate due to low value 0 - 17 mg/g Cr       ASSESSMENT/PLAN:   1. Routine general medical examination at a health care facility  : Discussed on regular exercises, healthy eating, self testicular exams  and routine dental checks.  - HIV Antigen Antibody Combo  - Hepatitis C antibody  - zoster vaccine recombinant adjuvanted (SHINGRIX) injection; Inject 0.5 mLs into the muscle once for 1 dose  Dispense: 0.5 mL; Refill: 0    2. Urinary frequency  Refills given per patient's request  - tamsulosin (FLOMAX) 0.4 MG capsule; TAKE 1 CAPSULE BY MOUTH ONCE A DAY  Dispense: 90 capsule; Refill: 3    3. Benign essential hypertension  BP Readings from Last 6 Encounters:   08/17/18 136/84   08/01/18 137/86   03/05/18 122/77   02/21/18 121/77   02/19/18 138/88   01/29/18 128/81     Blood pressure is at goal, continue with current medications,  Will follow low salt diet, weight loss and regular exercises.  Recheck in 6 months  - losartan (COZAAR) 100 MG tablet; Take 1 tablet (100 mg) by mouth daily  Dispense: 90 tablet; Refill: 1  - hydrochlorothiazide (HYDRODIURIL) 25 MG tablet; Take 1 tablet (25 mg) by mouth daily  Dispense: 90 tablet; Refill: 1    4. Screening for human immunodeficiency virus    - HIV Antigen Antibody Combo    5. Need for hepatitis C screening test    - Hepatitis C antibody    6. CARDIOVASCULAR SCREENING; LDL GOAL LESS THAN 160  LDL Cholesterol Calculated   Date Value Ref Range  Status   08/17/2018 108 (H) <100 mg/dL Final     Comment:     Above desirable:  100-129 mg/dl  Borderline High:  130-159 mg/dL  High:             160-189 mg/dL  Very high:       >189 mg/dl           7. Need for shingles vaccine  Hard copy was given to patient to have it done at the pharmacy after checking with insurance for coverage.    - zoster vaccine recombinant adjuvanted (SHINGRIX) injection; Inject 0.5 mLs into the muscle once for 1 dose  Dispense: 0.5 mL; Refill: 0    8. Sore of penis  ddx-candidal dermatitis/injury from recent frequent sex/?  Genital herpes  Patient does not have any concerns for pain,   he recently got  4 months ago, has no previous history of STD or herpes  Considered swab from the sore for herpes screening, patient declined  Patient will try over-the-counter bacitracin twice daily for 1 week, if no better, he will try over-the-counter clotrimazole cream twice daily for 1 week.    If no better in 2 weeks, patient will make sure to follow for further evaluation.    9. Testicular hypofunction  Patient was on testosterone supplement few years ago  Offered checking the levels first before considering retreatment given his concern for day time fatigue and drowsiness, patient wants to have it done next year    10. Morbid obesity with BMI of 40.0-44.9, adult (H)  Wt Readings from Last 5 Encounters:   08/17/18 303 lb 6.4 oz (137.6 kg)   08/01/18 306 lb 6.4 oz (139 kg)   02/20/18 280 lb (127 kg)   02/19/18 281 lb (127.5 kg)   01/29/18 277 lb 14.4 oz (126.1 kg)     Emphasized on weight loss, portion control, low calorie and low fat diet, healthy eating, regular exercises. Offered dietary consult, declined. Encouraged to enroll in a weight loss program for tracking on meal planning and weight.        COUNSELING:  Reviewed preventive health counseling, as reflected in patient instructions  Special attention given to:        Regular exercise       Healthy diet/nutrition       Vision screening     "   Safe sex practices/STD prevention       Consider Hep C screening for patients born between 1945 and 1965       HIV screeninx in teen years, 1x in adult years, and at intervals if high risk       Prostate cancer screening       Osteoporosis Prevention/Bone Health       The 10-year ASCVD risk score (Bebeto WALTON Jr, et al., 2013) is: 8.4%    Values used to calculate the score:      Age: 58 years      Sex: Male      Is Non- : No      Diabetic: No      Tobacco smoker: No      Systolic Blood Pressure: 136 mmHg      Is BP treated: Yes      HDL Cholesterol: 42 mg/dL      Total Cholesterol: 185 mg/dL       Advance Care Planning    BP Readings from Last 1 Encounters:   18 136/84     Estimated body mass index is 42.92 kg/(m^2) as calculated from the following:    Height as of this encounter: 5' 10.5\" (1.791 m).    Weight as of this encounter: 303 lb 6.4 oz (137.6 kg).      Weight management plan: Discussed healthy diet and exercise guidelines and patient will follow up in 6 months in clinic to re-evaluate.     reports that he has never smoked. He has never used smokeless tobacco.      Counseling Resources:  ATP IV Guidelines  Pooled Cohorts Equation Calculator  FRAX Risk Assessment  ICSI Preventive Guidelines  Dietary Guidelines for Americans, 2010  USDA's MyPlate  ASA Prophylaxis  Lung CA Screening    Uli Owens MD  [facility]  Chart documentation done in part with Dragon Voice recognition Software. Although reviewed after completion, some word and grammatical error may remain.    "

## 2018-08-20 LAB
HCV AB SERPL QL IA: NONREACTIVE
HIV 1+2 AB+HIV1 P24 AG SERPL QL IA: NONREACTIVE

## 2018-08-20 NOTE — PROGRESS NOTES
Please send letter with normal results.  Your test results for HIV screening and hepatitis C screening on both negative, this is good and reassuring.   Let me know if you have any questions. Take care.  Uli Owens MD

## 2019-01-02 ENCOUNTER — TELEPHONE (OUTPATIENT)
Dept: PEDIATRICS | Facility: CLINIC | Age: 59
End: 2019-01-02

## 2019-01-02 NOTE — TELEPHONE ENCOUNTER
Left message for patient to return clinic call regarding scheduling. Patient needs a Return  appointment for medication recheck with  on or around 2/17/19. Number to clinic and Mychart option given, please assist in scheduling once patient returns clinic call    Call Center OKAY TO SCHEDULE.    Thanks,   Karoline Espinoza  Primary Care   [facility]

## 2019-01-02 NOTE — LETTER
January 23, 2019      [place]  [address] [address]        Dear [place],    In order to ensure that we are providing the best quality care, we would like to remind you that you are due for a Return appointment with  around 2/17/19 for medication recheck.      We have been unable to reach you by phone. Please call your clinic or use [email or web address] to make an appointment with your provider before you run out of medication. This will prevent a delay in your next refill. Please let us know if you have any questions and we would be happy to help.     Thank you for trusting us with your care.    Sincerely,     [facility]  [phone]

## 2019-01-23 NOTE — TELEPHONE ENCOUNTER
3rd attempt    Chart letter created and sent.    Karoline Espinoza  Primary Care   [facility] [place]

## 2019-02-09 DIAGNOSIS — I10 BENIGN ESSENTIAL HYPERTENSION: ICD-10-CM

## 2019-02-11 RX ORDER — HYDROCHLOROTHIAZIDE 25 MG/1
TABLET ORAL
Qty: 30 TABLET | Refills: 0 | Status: SHIPPED | OUTPATIENT
Start: 2019-02-11 | End: 2019-04-12

## 2019-02-11 RX ORDER — LOSARTAN POTASSIUM 100 MG/1
TABLET ORAL
Qty: 30 TABLET | Refills: 0 | Status: SHIPPED | OUTPATIENT
Start: 2019-02-11 | End: 2019-04-12

## 2019-06-21 DIAGNOSIS — I10 BENIGN ESSENTIAL HYPERTENSION: ICD-10-CM

## 2019-06-21 RX ORDER — HYDROCHLOROTHIAZIDE 25 MG/1
TABLET ORAL
Qty: 30 TABLET | Refills: 0 | OUTPATIENT
Start: 2019-06-21

## 2019-06-21 RX ORDER — LOSARTAN POTASSIUM 100 MG/1
TABLET ORAL
Qty: 30 TABLET | Refills: 0 | OUTPATIENT
Start: 2019-06-21

## 2019-06-21 NOTE — TELEPHONE ENCOUNTER
LOV- 8/17 says to recheck HTN in 6 months. PCP gave a ezra and patient was notified in 4/17 encounter but hasn't scheduled. Previous attempts were made in January and February. Sent message to pharmacy.   Karlie Madera RN

## 2019-07-26 ENCOUNTER — OFFICE VISIT (OUTPATIENT)
Dept: FAMILY MEDICINE | Facility: CLINIC | Age: 59
End: 2019-07-26
Payer: COMMERCIAL

## 2019-07-26 VITALS
HEART RATE: 68 BPM | OXYGEN SATURATION: 97 % | DIASTOLIC BLOOD PRESSURE: 90 MMHG | TEMPERATURE: 97.3 F | HEIGHT: 71 IN | RESPIRATION RATE: 14 BRPM | BODY MASS INDEX: 44.1 KG/M2 | WEIGHT: 315 LBS | SYSTOLIC BLOOD PRESSURE: 150 MMHG

## 2019-07-26 DIAGNOSIS — R35.0 URINARY FREQUENCY: ICD-10-CM

## 2019-07-26 DIAGNOSIS — I10 BENIGN ESSENTIAL HYPERTENSION: Primary | ICD-10-CM

## 2019-07-26 DIAGNOSIS — E66.01 MORBID OBESITY WITH BMI OF 40.0-44.9, ADULT (H): ICD-10-CM

## 2019-07-26 DIAGNOSIS — Z12.5 SCREENING FOR PROSTATE CANCER: ICD-10-CM

## 2019-07-26 PROCEDURE — 99214 OFFICE O/P EST MOD 30 MIN: CPT | Performed by: NURSE PRACTITIONER

## 2019-07-26 RX ORDER — TAMSULOSIN HYDROCHLORIDE 0.4 MG/1
CAPSULE ORAL
Qty: 90 CAPSULE | Refills: 3 | Status: SHIPPED | OUTPATIENT
Start: 2019-07-26 | End: 2020-07-23

## 2019-07-26 RX ORDER — HYDROCHLOROTHIAZIDE 25 MG/1
25 TABLET ORAL DAILY
Qty: 90 TABLET | Refills: 3 | Status: SHIPPED | OUTPATIENT
Start: 2019-07-26 | End: 2020-07-23

## 2019-07-26 RX ORDER — LOSARTAN POTASSIUM 100 MG/1
100 TABLET ORAL DAILY
Qty: 90 TABLET | Refills: 3 | Status: SHIPPED | OUTPATIENT
Start: 2019-07-26 | End: 2020-07-23

## 2019-07-26 ASSESSMENT — MIFFLIN-ST. JEOR: SCORE: 2376.32

## 2019-07-26 NOTE — PROGRESS NOTES
"  SUBJECTIVE:   CC: [place] is an 59 year old male who presents for preventive health visit.     Healthy Habits:    Do you get at least three servings of calcium containing foods daily (dairy, green leafy vegetables, etc.)? { :721017::\"yes\"}    Amount of exercise or daily activities, outside of work: { :925266}    Problems taking medications regularly { :658777::\"No\"}    Medication side effects: { :197291::\"No\"}    Have you had an eye exam in the past two years? { :482695}    Do you see a dentist twice per year? { :889338}    Do you have sleep apnea, excessive snoring or daytime drowsiness?{ :974542}  {Outside tests to abstract? :279728}    {additional problems to add (Optional):817764}    Today's PHQ-2 Score:   PHQ-2 ( 1999 Pfizer) 5/12/2017 7/19/2016   Q1: Little interest or pleasure in doing things 0 0   Q2: Feeling down, depressed or hopeless 0 0   PHQ-2 Score 0 0     {PHQ-2 LOOK IN ASSESSMENTS (Optional) :067285}  Abuse: Current or Past(Physical, Sexual or Emotional)- {YES/NO/NA:952881}  Do you feel safe in your environment? {YES/NO/NA:134367}    Social History     Tobacco Use     Smoking status: Never Smoker     Smokeless tobacco: Never Used   Substance Use Topics     Alcohol use: No     If you drink alcohol do you typically have >3 drinks per day or >7 drinks per week? {ETOH :327245}                      Last PSA:   PSA   Date Value Ref Range Status   08/17/2018 0.50 0 - 4 ug/L Final     Comment:     Assay Method:  Chemiluminescence using Siemens Vista analyzer       Reviewed orders with patient. Reviewed health maintenance and updated orders accordingly - {Yes/No:758396::\"Yes\"}  {Chronicprobdata (Optional):491770}    Reviewed and updated as needed this visit by clinical staff         Reviewed and updated as needed this visit by Provider        {HISTORY OPTIONS (Optional):982826}    ROS:  { :035075::\"CONSTITUTIONAL: NEGATIVE for fever, chills, change in weight\",\"INTEGUMENTARY/SKIN: NEGATIVE for " "worrisome rashes, moles or lesions\",\"EYES: NEGATIVE for vision changes or irritation\",\"ENT: NEGATIVE for ear, mouth and throat problems\",\"RESP: NEGATIVE for significant cough or SOB\",\"CV: NEGATIVE for chest pain, palpitations or peripheral edema\",\"GI: NEGATIVE for nausea, abdominal pain, heartburn, or change in bowel habits\",\" male: negative for dysuria, hematuria, decreased urinary stream, erectile dysfunction, urethral discharge\",\"MUSCULOSKELETAL: NEGATIVE for significant arthralgias or myalgia\",\"NEURO: NEGATIVE for weakness, dizziness or paresthesias\",\"PSYCHIATRIC: NEGATIVE for changes in mood or affect\"}    OBJECTIVE:   There were no vitals taken for this visit.  EXAM:  {Exam Choices:519586}    {Diagnostic Test Results (Optional):632020::\"Diagnostic Test Results:\",\"Labs reviewed in Epic\"}    ASSESSMENT/PLAN:   {Diag Picklist:416263}    COUNSELING:  {MALE COUNSELING MESSAGES:879623::\"Reviewed preventive health counseling, as reflected in patient instructions\"}    Estimated body mass index is 42.92 kg/m  as calculated from the following:    Height as of 8/17/18: 1.791 m (5' 10.5\").    Weight as of 8/17/18: 137.6 kg (303 lb 6.4 oz).    {Weight Management Plan (ACO) Complete if BMI is abnormal-  Ages 18-64  BMI >24.9.  Age 65+ with BMI <23 or >30 (Optional):840184}     reports that he has never smoked. He has never used smokeless tobacco.  {Tobacco Cessation -- Complete if patient is a smoker (Optional):877101}    Counseling Resources:  ATP IV Guidelines  Pooled Cohorts Equation Calculator  FRAX Risk Assessment  ICSI Preventive Guidelines  Dietary Guidelines for Americans, 2010  USDA's MyPlate  ASA Prophylaxis  Lung CA Screening    RAVEN [facility]  "

## 2019-07-26 NOTE — PROGRESS NOTES
"Subjective     [place] is a 59 year old male who presents to clinic today for the following health issues:    He wants medication refills today - does not want a physical    HPI   Hypertension Follow-up and medication check      Do you check your blood pressure regularly outside of the clinic? Yes     Are you following a low salt diet? Yes    Are your blood pressures ever more than 140 on the top number (systolic) OR more   than 90 on the bottom number (diastolic), for example 140/90? No    Amount of exercise or physical activity: 6-7 days/week for an average of greater than 60 minutes    Problems taking medications regularly: No    Medication side effects: none    Diet: regular (no restrictions)      Patient would also like to get refills for Flomax.    Flomax works well for him  No side effects  He hasn't been taking medications x 1 week because he is out.    Obesity:  His new wife is making him go to the gym every day.  He has recently lost 9 lbs.                  Reviewed and updated as needed this visit by Provider  Tobacco  Allergies  Meds  Problems  Med Hx  Surg Hx  Fam Hx         Review of Systems   ROS COMP: Constitutional, HEENT, cardiovascular, pulmonary, gi and gu systems are negative, except as otherwise noted.      Objective    /90 (BP Location: Right arm, Cuff Size: Adult Large)   Pulse 68   Temp 97.3  F (36.3  C) (Tympanic)   Resp 14   Ht 1.797 m (5' 10.75\")   Wt (!) 153.3 kg (338 lb)   SpO2 97%   BMI 47.48 kg/m    Body mass index is 47.48 kg/m .  Physical Exam   GENERAL: healthy, alert and no distress  NECK: no adenopathy, no asymmetry, masses, or scars and thyroid normal to palpation  RESP: lungs clear to auscultation - no rales, rhonchi or wheezes  CV: regular rate and rhythm, normal S1 S2, no S3 or S4, no murmur, click or rub, no peripheral edema and peripheral pulses strong  ABDOMEN: soft, nontender, no hepatosplenomegaly, no masses and bowel sounds normal  MS: no " "gross musculoskeletal defects noted, no edema            Assessment & Plan       ICD-10-CM    1. Benign essential hypertension I10 Elevated today - has been out of medications.  Restart all medications - recheck with the RN in one month.  hydrochlorothiazide (HYDRODIURIL) 25 MG tablet     losartan (COZAAR) 100 MG tablet     **Basic metabolic panel FUTURE anytime     2. Urinary frequency R35.0 Restart tamsulosin (FLOMAX) 0.4 MG capsule     3. Morbid obesity with BMI of 40.0-44.9, adult (H) E66.01 He will return when fasting for labs.  Lipid panel reflex to direct LDL Fasting    Z68.41    4. Screening for prostate cancer Z12.5 PSA, screen        BMI:   Estimated body mass index is 47.48 kg/m  as calculated from the following:    Height as of this encounter: 1.797 m (5' 10.75\").    Weight as of this encounter: 153.3 kg (338 lb).   Weight management plan: Discussed healthy diet and exercise guidelines            Return in about 4 weeks (around 8/23/2019) for Lab Work.    RAVEN Graham [facility]      "

## 2019-09-05 ENCOUNTER — TELEPHONE (OUTPATIENT)
Dept: FAMILY MEDICINE | Facility: CLINIC | Age: 59
End: 2019-09-05

## 2019-09-11 NOTE — TELEPHONE ENCOUNTER
Pt return call.  He states that he does not live up here and he will try to find a [facility] clinic closer to him home to have his BP checked.

## 2019-09-14 DIAGNOSIS — I10 BENIGN ESSENTIAL HYPERTENSION: ICD-10-CM

## 2019-09-14 DIAGNOSIS — E66.01 MORBID OBESITY WITH BMI OF 40.0-44.9, ADULT (H): ICD-10-CM

## 2019-09-14 DIAGNOSIS — Z12.5 SCREENING FOR PROSTATE CANCER: ICD-10-CM

## 2019-09-14 LAB
ANION GAP SERPL CALCULATED.3IONS-SCNC: 4 MMOL/L (ref 3–14)
BUN SERPL-MCNC: 21 MG/DL (ref 7–30)
CALCIUM SERPL-MCNC: 9.1 MG/DL (ref 8.5–10.1)
CHLORIDE SERPL-SCNC: 106 MMOL/L (ref 94–109)
CHOLEST SERPL-MCNC: 191 MG/DL
CO2 SERPL-SCNC: 31 MMOL/L (ref 20–32)
CREAT SERPL-MCNC: 0.84 MG/DL (ref 0.66–1.25)
GFR SERPL CREATININE-BSD FRML MDRD: >90 ML/MIN/{1.73_M2}
GLUCOSE SERPL-MCNC: 96 MG/DL (ref 70–99)
HDLC SERPL-MCNC: 42 MG/DL
LDLC SERPL CALC-MCNC: 116 MG/DL
NONHDLC SERPL-MCNC: 149 MG/DL
POTASSIUM SERPL-SCNC: 3.8 MMOL/L (ref 3.4–5.3)
PSA SERPL-ACNC: 0.65 UG/L (ref 0–4)
SODIUM SERPL-SCNC: 141 MMOL/L (ref 133–144)
TRIGL SERPL-MCNC: 165 MG/DL

## 2019-09-14 PROCEDURE — 80048 BASIC METABOLIC PNL TOTAL CA: CPT | Performed by: NURSE PRACTITIONER

## 2019-09-14 PROCEDURE — 36415 COLL VENOUS BLD VENIPUNCTURE: CPT | Performed by: NURSE PRACTITIONER

## 2019-09-14 PROCEDURE — 80061 LIPID PANEL: CPT | Performed by: NURSE PRACTITIONER

## 2019-09-14 PROCEDURE — G0103 PSA SCREENING: HCPCS | Performed by: NURSE PRACTITIONER

## 2019-10-17 ENCOUNTER — OFFICE VISIT (OUTPATIENT)
Dept: ORTHOPEDICS | Facility: CLINIC | Age: 59
End: 2019-10-17
Payer: COMMERCIAL

## 2019-10-17 VITALS — BODY MASS INDEX: 44.1 KG/M2 | WEIGHT: 315 LBS | HEIGHT: 71 IN

## 2019-10-17 DIAGNOSIS — M25.512 CHRONIC LEFT SHOULDER PAIN: Primary | ICD-10-CM

## 2019-10-17 DIAGNOSIS — G89.29 CHRONIC LEFT SHOULDER PAIN: Primary | ICD-10-CM

## 2019-10-17 PROCEDURE — 99214 OFFICE O/P EST MOD 30 MIN: CPT | Performed by: FAMILY MEDICINE

## 2019-10-17 ASSESSMENT — MIFFLIN-ST. JEOR: SCORE: 2376.32

## 2019-10-17 NOTE — LETTER
"    10/17/2019         RE: Foreign Pond  [address] [address]        Dear Colleague,    Thank you for referring your patient, Foreign Pond, to the [facility]. Please see a copy of my visit note below.          [facility]  10/17/2019    Foreign Pond's chief complaint for this visit includes:  Chief Complaint   Patient presents with     Consult     left shoulder pain, no known injury, pain for a while      PCP: Uli Owens    Referring Provider:  Uli Owens MD  [id number] [place] [address]    Ht 1.797 m (5' 10.75\")   Wt (!) 153.3 kg (338 lb)   BMI 47.48 kg/m     Data Unavailable       Reason for visit:     What part of your body is injured / painful?  left shoulder    What caused the injury /pain? No inciting event     How long ago did your injury occur or pain begin? several months ago    What are your most bothersome symptoms? Pain    How would you characterize your symptom?  aching    What makes your symptoms better? Rest    What makes your symptoms worse? Movement    Have you been previously seen for this problem? No    Medical History:    Any recent changes to your medical history? No    Any new medication prescribed since last visit? No    Have you had surgery on this body part before? No    Social History:    Occupation:     Handedness: Left    Review of Systems:    Do you have fever, chills, weight loss? No    Do you have any vision problems? No    Do you have any chest pain or edema? No    Do you have any shortness of breath or wheezing?  No    Do you have stomach problems? No    Do you have any numbness or focal weakness? No    Do you have diabetes? No    Do you have problems with bleeding or clotting? No    Do you have an rashes or other skin lesions? No       CHIEF COMPLAINT:  Consult (left shoulder pain, no known injury, pain for a while )       HISTORY OF PRESENT ILLNESS  Mr. Pond is a pleasant 59 year old year old male " "who presents to clinic today with left shoulder pain.  He is seen at the request of Dr. Owens.    Foreign has had left shoulder pain for years, no clear inciting event that he can recall.  He works as an .  His shoulder has slowly lost range of motion over the past couple of years.  His pain is mostly in his shoulder blade, anterior shoulder, and lateral shoulder.  Feels like it is deep inside.  He is left-handed, this is his dominant arm.  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  This affects his job and his way of life.      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 1 tablet 3     hydrochlorothiazide (HYDRODIURIL) 25 MG tablet Take 1 tablet (25 mg) by mouth daily 90 tablet 3     losartan (COZAAR) 100 MG tablet Take 1 tablet (100 mg) by mouth daily 90 tablet 3     tamsulosin (FLOMAX) 0.4 MG capsule TAKE 1 CAPSULE BY MOUTH ONCE A DAY 90 capsule 3     UNABLE TO FIND Take 5 tablets by mouth 2 times daily \"Isogenic vitamins\"         Allergies   Allergen Reactions     Lisinopril Cough       Family History   Problem Relation Age of Onset     Hypertension Father      C.A.D. Father      Thyroid Disease Father      Respiratory Mother         asthma     Cancer Mother      Alcohol/Drug Maternal Grandfather      Cerebrovascular Disease Paternal Grandfather      Alcohol/Drug Paternal Grandfather      Hypertension Brother      Heart Disease Brother        Additional medical/Social/Surgical histories reviewed in [facility] and updated as appropriate.          PHYSICAL " "EXAM  Vitals:    10/17/19 1615   Weight: (!) 153.3 kg (338 lb)   Height: 1.797 m (5' 10.75\")     General  - normal appearance, in no obvious distress  CV  - normal radial pulse  Pulm  - normal respiratory pattern, non-labored  Musculoskeletal - left shoulder  - inspection: normal bone and joint alignment  - palpation: mildly tender RC insertion, normal clavicle, non-tender AC  - ROM: painful and limited flexion at 90 deg  - strength: 5/5  strength, 5/5 in all shoulder planes  - special tests:  (-) Speed's  (+) Neer  (+) Hawkin's  (+) Era's  Neuro  - no sensory or motor deficit, grossly normal coordination, normal muscle tone  Skin  - no ecchymosis, erythema, warmth, or induration, no obvious rash  Psych  - interactive, appropriate, normal mood and affect             ASSESSMENT & PLAN  Mr. Pond is a 59 year old year old male who presents to clinic today with left shoulder pain.    I reviewed his prior x-ray in the room with him which does show glenohumeral osteoarthritis and acromioclavicular osteoarthritis.    I had a long discussion with Foreign centering around his ultimate goals.  He does have to work for a few more years before he can retire, he is currently unable to work the way he feels at the moment.  I am ordering an MRI of the shoulder, I will get in touch with him with the results.    Thank you for allowing me to participate in Foreign's care.    Kevin Boss DO, [facility]  Primary Care Sports Medicine       This note was constructed using Dragon dictation software, please excuse any minor errors in spelling, grammar, or syntax.      Again, thank you for allowing me to participate in the care of your patient.        Sincerely,        Kevin Boss DO    "

## 2019-10-17 NOTE — PROGRESS NOTES
"      [facility]  10/17/2019    Foreign Pond's chief complaint for this visit includes:  Chief Complaint   Patient presents with     Consult     left shoulder pain, no known injury, pain for a while      PCP: Uli Owens    Referring Provider:  Uli Owens MD  [address] [place] [address]    Ht 1.797 m (5' 10.75\")   Wt (!) 153.3 kg (338 lb)   BMI 47.48 kg/m    Data Unavailable       Reason for visit:     What part of your body is injured / painful?  left shoulder    What caused the injury /pain? No inciting event     How long ago did your injury occur or pain begin? several months ago    What are your most bothersome symptoms? Pain    How would you characterize your symptom?  aching    What makes your symptoms better? Rest    What makes your symptoms worse? Movement    Have you been previously seen for this problem? No    Medical History:    Any recent changes to your medical history? No    Any new medication prescribed since last visit? No    Have you had surgery on this body part before? No    Social History:    Occupation:     Handedness: Left    Review of Systems:    Do you have fever, chills, weight loss? No    Do you have any vision problems? No    Do you have any chest pain or edema? No    Do you have any shortness of breath or wheezing?  No    Do you have stomach problems? No    Do you have any numbness or focal weakness? No    Do you have diabetes? No    Do you have problems with bleeding or clotting? No    Do you have an rashes or other skin lesions? No       CHIEF COMPLAINT:  Consult (left shoulder pain, no known injury, pain for a while )       HISTORY OF PRESENT ILLNESS  Mr. Pond is a pleasant 59 year old year old male who presents to clinic today with left shoulder pain.  He is seen at the request of Dr. Owens.    Foreign has had left shoulder pain for years, no clear inciting event that he can recall.  He works as an .  His shoulder has slowly " "lost range of motion over the past couple of years.  His pain is mostly in his shoulder blade, anterior shoulder, and lateral shoulder.  Feels like it is deep inside.  He is left-handed, this is his dominant arm.  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  This affects his job and his way of life.      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 1 tablet 3     hydrochlorothiazide (HYDRODIURIL) 25 MG tablet Take 1 tablet (25 mg) by mouth daily 90 tablet 3     losartan (COZAAR) 100 MG tablet Take 1 tablet (100 mg) by mouth daily 90 tablet 3     tamsulosin (FLOMAX) 0.4 MG capsule TAKE 1 CAPSULE BY MOUTH ONCE A DAY 90 capsule 3     UNABLE TO FIND Take 5 tablets by mouth 2 times daily \"Isogenic vitamins\"         Allergies   Allergen Reactions     Lisinopril Cough       Family History   Problem Relation Age of Onset     Hypertension Father      C.A.D. Father      Thyroid Disease Father      Respiratory Mother         asthma     Cancer Mother      Alcohol/Drug Maternal Grandfather      Cerebrovascular Disease Paternal Grandfather      Alcohol/Drug Paternal Grandfather      Hypertension Brother      Heart Disease Brother        Additional medical/Social/Surgical histories reviewed in [facility] and updated as appropriate.          PHYSICAL EXAM  Vitals:    10/17/19 1615   Weight: (!) 153.3 kg (338 lb)   Height: 1.797 m (5' 10.75\")     General  - normal appearance, in no obvious distress  CV  - normal radial pulse  Pulm  - normal respiratory pattern, non-labored  Musculoskeletal " - left shoulder  - inspection: normal bone and joint alignment  - palpation: mildly tender RC insertion, normal clavicle, non-tender AC  - ROM: painful and limited flexion at 90 deg  - strength: 5/5  strength, 5/5 in all shoulder planes  - special tests:  (-) Speed's  (+) Neer  (+) Hawkin's  (+) Era's  Neuro  - no sensory or motor deficit, grossly normal coordination, normal muscle tone  Skin  - no ecchymosis, erythema, warmth, or induration, no obvious rash  Psych  - interactive, appropriate, normal mood and affect             ASSESSMENT & PLAN  Mr. Pond is a 59 year old year old male who presents to clinic today with left shoulder pain.    I reviewed his prior x-ray in the room with him which does show glenohumeral osteoarthritis and acromioclavicular osteoarthritis.    I had a long discussion with Foreign centering around his ultimate goals.  He does have to work for a few more years before he can retire, he is currently unable to work the way he feels at the moment.  I am ordering an MRI of the shoulder, I will get in touch with him with the results.    Thank you for allowing me to participate in Foreign's care.    Kevin Boss DO, [facility]  Primary Care Sports Medicine       This note was constructed using Dragon dictation software, please excuse any minor errors in spelling, grammar, or syntax.

## 2019-10-21 ENCOUNTER — ANCILLARY PROCEDURE (OUTPATIENT)
Dept: MRI IMAGING | Facility: CLINIC | Age: 59
End: 2019-10-21
Attending: FAMILY MEDICINE
Payer: COMMERCIAL

## 2019-10-21 DIAGNOSIS — M25.512 CHRONIC LEFT SHOULDER PAIN: ICD-10-CM

## 2019-10-21 DIAGNOSIS — G89.29 CHRONIC LEFT SHOULDER PAIN: ICD-10-CM

## 2019-10-21 PROCEDURE — 73221 MRI JOINT UPR EXTREM W/O DYE: CPT | Mod: LT | Performed by: RADIOLOGY

## 2019-10-25 ENCOUNTER — PRE VISIT (OUTPATIENT)
Dept: ORTHOPEDICS | Facility: CLINIC | Age: 59
End: 2019-10-25

## 2019-10-25 ENCOUNTER — TELEPHONE (OUTPATIENT)
Dept: ORTHOPEDICS | Facility: CLINIC | Age: 59
End: 2019-10-25

## 2019-10-25 DIAGNOSIS — M25.512 LEFT SHOULDER PAIN: Primary | ICD-10-CM

## 2019-10-28 ENCOUNTER — OFFICE VISIT (OUTPATIENT)
Dept: ORTHOPEDICS | Facility: CLINIC | Age: 59
End: 2019-10-28
Payer: COMMERCIAL

## 2019-10-28 ENCOUNTER — ANCILLARY PROCEDURE (OUTPATIENT)
Dept: GENERAL RADIOLOGY | Facility: CLINIC | Age: 59
End: 2019-10-28
Attending: ORTHOPAEDIC SURGERY
Payer: COMMERCIAL

## 2019-10-28 ENCOUNTER — TELEPHONE (OUTPATIENT)
Dept: ORTHOPEDICS | Facility: CLINIC | Age: 59
End: 2019-10-28

## 2019-10-28 VITALS
HEART RATE: 77 BPM | DIASTOLIC BLOOD PRESSURE: 94 MMHG | HEIGHT: 71 IN | SYSTOLIC BLOOD PRESSURE: 140 MMHG | OXYGEN SATURATION: 95 % | WEIGHT: 315 LBS | BODY MASS INDEX: 44.1 KG/M2

## 2019-10-28 DIAGNOSIS — M19.012 ARTHRITIS OF SHOULDER REGION, LEFT: Primary | ICD-10-CM

## 2019-10-28 DIAGNOSIS — M25.512 LEFT SHOULDER PAIN: ICD-10-CM

## 2019-10-28 PROCEDURE — 73030 X-RAY EXAM OF SHOULDER: CPT | Mod: LT | Performed by: RADIOLOGY

## 2019-10-28 PROCEDURE — 99203 OFFICE O/P NEW LOW 30 MIN: CPT | Performed by: ORTHOPAEDIC SURGERY

## 2019-10-28 ASSESSMENT — MIFFLIN-ST. JEOR: SCORE: 2362.82

## 2019-10-28 ASSESSMENT — PAIN SCALES - GENERAL: PAINLEVEL: SEVERE PAIN (7)

## 2019-10-28 NOTE — TELEPHONE ENCOUNTER
Date Scheduled: 1-7-20  Facility: [facility]  Surgeon: Dr. mAes   Post-op appointment scheduled:   Next 5 appointments (look out 90 days)    Dec 20, 2019 10:10 AM CST  Pre-Op physical with Uli Owens MD  [facility]) [address] [place] [id number]  [phone]   Jan 23, 2020 10:00 AM CST  Return Visit with Zahida Ames MD  [facility]) [address] [place] [id number] [phone]           scheduled?: No  Surgery packet/instructions confirmed received?  Yes  Special Considerations:   Dilia Goldsmith, Surgery Scheduling Coordinator

## 2019-10-28 NOTE — PROGRESS NOTES
"CHIEF CONCERN:  Left shoulder pain    HISTORY OF PRESENT ILLNESS: Mr. Garcia is a 59-year-old left-hand-dominant male who is seen today for left shoulder pain.  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  Per Dr. Boss's note on 10/17/19: \"Foreign has had left shoulder pain for years, no clear inciting event that he can recall.  He works as an .  His shoulder has slowly lost range of motion over the past couple of years.  His pain is mostly in his shoulder blade, anterior shoulder, and lateral shoulder.  Feels like it is deep inside.  He is left-handed, this is his dominant arm.  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  This affects his job and his way of life.\"  Patient notes he was seen in the [facility] ED in 2017 with an xray demonstrating arthrosis and was advised to have follow up with ortho but he never did that.    Past Medical History:   Diagnosis Date     Asthma      BPH (benign prostatic hyperplasia)      Hypercholesteremia      Hypertension      Mild intermittent asthma 8/15/2006    Patient states he does not have asthma.  Doesn't use albuterol ever.          Past Surgical History:   Procedure Laterality Date     COLONOSCOPY  7/2006    repeat in 10 years     COLONOSCOPY N/A 12/12/2016    Procedure: COLONOSCOPY;  Surgeon: Delonte Miller MD;  Location: [facility]     CREATE EARDRUM OPENING,LOCAL ANESTH  age 15     HC ECP WITH CATARACT SURGERY       LAPAROSCOPIC CHOLECYSTECTOMY  11/8/2012    Procedure: LAPAROSCOPIC CHOLECYSTECTOMY;  Laparoscopic Cholecystectomy & repair of umbilical hernia;  Surgeon: Delonte Miller MD;  Location: [facility]     LASIK BILATERAL  2011     VASECTOMY Bilateral 2/21/2018    Procedure: VASECTOMY;  Bilateral vasectomy;  Surgeon: Kavon Everett MD;  Location: [facility] OR       Current Outpatient Medications   Medication Sig Dispense Refill     aspirin 81 MG tablet Take 1 tablet by mouth daily. 1 tablet 3     hydrochlorothiazide " "(HYDRODIURIL) 25 MG tablet Take 1 tablet (25 mg) by mouth daily 90 tablet 3     losartan (COZAAR) 100 MG tablet Take 1 tablet (100 mg) by mouth daily 90 tablet 3     tamsulosin (FLOMAX) 0.4 MG capsule TAKE 1 CAPSULE BY MOUTH ONCE A DAY 90 capsule 3     UNABLE TO FIND Take 5 tablets by mouth 2 times daily \"Isogenic vitamins\"            Allergies   Allergen Reactions     Lisinopril Cough       SOCIAL HISTORY:    Social History     Socioeconomic History     Marital status:      Spouse name: Not on file     Number of children: Not on file     Years of education: Not on file     Highest education level: Not on file   Occupational History     Not on file   Social Needs     Financial resource strain: Not on file     Food insecurity:     Worry: Not on file     Inability: Not on file     Transportation needs:     Medical: Not on file     Non-medical: Not on file   Tobacco Use     Smoking status: Never Smoker     Smokeless tobacco: Never Used   Substance and Sexual Activity     Alcohol use: No     Drug use: No     Sexual activity: Yes     Partners: Female   Lifestyle     Physical activity:     Days per week: Not on file     Minutes per session: Not on file     Stress: Not on file   Relationships     Social connections:     Talks on phone: Not on file     Gets together: Not on file     Attends Shinto service: Not on file     Active member of club or organization: Not on file     Attends meetings of clubs or organizations: Not on file     Relationship status: Not on file     Intimate partner violence:     Fear of current or ex partner: Not on file     Emotionally abused: Not on file     Physically abused: Not on file     Forced sexual activity: Not on file   Other Topics Concern     Parent/sibling w/ CABG, MI or angioplasty before 65F 55M? Yes     Comment: father MI - unknown age    Social History Narrative     Not on file       FAMILY HISTORY: Reviewed in EMR      REVIEW OF SYSTEMS: Positive for that noted in past " medical history and history of present illness and otherwise reviewed in EMR     PHYSICAL EXAM:    Adult male in no acute distress. Articulates and communicates with normal affect.  Respirations even and unlabored  Focused upper extremity exam: Skin intact. No erythema. Sensation intact all dermatomes into the hand to light touch. EPL, FPL, and Intrinsics intact. Right shoulder active motion is FE to 170, ER at side to 45, and IR to T12. Left shoulder active motion is FE to 90, ER to (-)5, and IR to GT.  On the left, pain with most provocative tests including Neer and Dixon. No pain on palpation over the AC joint. Does have pain on palpation over the long head of the biceps. FE and ER strength 4/5 limited by pain    IMAGIN views of the left shoulder obtained today demonstrate near complete loss of the glenoid humeral joint space with a humeral osteophyte and subchondral sclerosis.  These findings are consistent with end-stage coronal humeral arthrosis.  Left shoulder MRI done 10/21/19 is reviewed and I agree with the radiology impression below:  IMPRESSION:  1. Moderate osteoarthrosis at the left shoulder acromioclavicular joint.  2. Marked osteoarthrosis at the left shoulder glenohumeral joint with areas of high-grade to full-thickness cartilage loss and subchondral edema, greatest along the posterior and the inferior aspect of the glenoid. Marked osteophytic spurring along the inferomedial aspect of the humeral head. Probable joint body in the region of the bicipital groove.  3. Low-grade articular and intrasubstance partial-thickness tear of the left shoulder supraspinatus . No full-thickness tear or tendon retraction involving the left shoulder rotator cuff tendons.  4. Tendinosis of the left shoulder subscapularis tendon without full-thickness tear or tendon retraction.  5. Fatty infiltration within the left shoulder teres minor muscle without masslike lesion. The remaining muscle bulk is intact.  6.  Tendinosis of the intra-articular biceps tendon.      ASSESSMENT:    1. Right end stage Glenohumeral arthritis    PLAN:  I reviewed the imaging with the patient.  We discussed the nature of glenohumeral arthritis. We discussed the non-operative and operative treatment options including the risks and potential benefits of each. These include activity modifications, cortisone injections, and PT for non-operative management, and total shoulder arthroplasty as the operative treatment option. We discussed the expectations for pain relief and/or motion improvement with each option. He notes his pain is very limiting at this time. He would very much like to proceed with surgical treatment. We reviewed postoperative rehab protocols and expectations. He would like to proceed with surgical scheduling and we will work with him in this regard.         Zahida Ames MD

## 2019-10-28 NOTE — LETTER
"    10/28/2019         RE: Foreign Pond  [address] [facility] [address] [address]        Dear Colleague,    Thank you for referring your patient, Foreign Pond, to the [facility]. Please see a copy of my visit note below.    CHIEF CONCERN:  Left shoulder pain    HISTORY OF PRESENT ILLNESS: Mr. Garcia is a 59-year-old left-hand-dominant male who is seen today for left shoulder pain.  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  Per Dr. Boss's note on 10/17/19: \"Foreign has had left shoulder pain for years, no clear inciting event that he can recall.  He works as an .  His shoulder has slowly lost range of motion over the past couple of years.  His pain is mostly in his shoulder blade, anterior shoulder, and lateral shoulder.  Feels like it is deep inside.  He is left-handed, this is his dominant arm.  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  This affects his job and his way of life.\"  Patient notes he was seen in the [facility] ED in 2017 with an xray demonstrating arthrosis and was advised to have follow up with ortho but he never did that.    Past Medical History:   Diagnosis Date     Asthma      BPH (benign prostatic hyperplasia)      Hypercholesteremia      Hypertension      Mild intermittent asthma 8/15/2006    Patient states he does not have asthma.  Doesn't use albuterol ever.          Past Surgical History:   Procedure Laterality Date     COLONOSCOPY  7/2006    repeat in 10 years     COLONOSCOPY N/A 12/12/2016    Procedure: COLONOSCOPY;  Surgeon: Delonte Miller MD;  Location: [facility]     CREATE EARDRUM OPENING,LOCAL ANESTH  age 15     [facility] ECP WITH CATARACT SURGERY       LAPAROSCOPIC CHOLECYSTECTOMY  11/8/2012    Procedure: LAPAROSCOPIC CHOLECYSTECTOMY;  Laparoscopic Cholecystectomy & repair of umbilical hernia;  Surgeon: Delonte Miller MD;  Location: [facility]     LASIK BILATERAL  2011     VASECTOMY Bilateral 2/21/2018    Procedure: VASECTOMY;  " "Bilateral vasectomy;  Surgeon: Kavon Everett MD;  Location: [facility] OR       Current Outpatient Medications   Medication Sig Dispense Refill     aspirin 81 MG tablet Take 1 tablet by mouth daily. 1 tablet 3     hydrochlorothiazide (HYDRODIURIL) 25 MG tablet Take 1 tablet (25 mg) by mouth daily 90 tablet 3     losartan (COZAAR) 100 MG tablet Take 1 tablet (100 mg) by mouth daily 90 tablet 3     tamsulosin (FLOMAX) 0.4 MG capsule TAKE 1 CAPSULE BY MOUTH ONCE A DAY 90 capsule 3     UNABLE TO FIND Take 5 tablets by mouth 2 times daily \"Isogenic vitamins\"            Allergies   Allergen Reactions     Lisinopril Cough       SOCIAL HISTORY:    Social History     Socioeconomic History     Marital status:      Spouse name: Not on file     Number of children: Not on file     Years of education: Not on file     Highest education level: Not on file   Occupational History     Not on file   Social Needs     Financial resource strain: Not on file     Food insecurity:     Worry: Not on file     Inability: Not on file     Transportation needs:     Medical: Not on file     Non-medical: Not on file   Tobacco Use     Smoking status: Never Smoker     Smokeless tobacco: Never Used   Substance and Sexual Activity     Alcohol use: No     Drug use: No     Sexual activity: Yes     Partners: Female   Lifestyle     Physical activity:     Days per week: Not on file     Minutes per session: Not on file     Stress: Not on file   Relationships     Social connections:     Talks on phone: Not on file     Gets together: Not on file     Attends Spiritism service: Not on file     Active member of club or organization: Not on file     Attends meetings of clubs or organizations: Not on file     Relationship status: Not on file     Intimate partner violence:     Fear of current or ex partner: Not on file     Emotionally abused: Not on file     Physically abused: Not on file     Forced sexual activity: Not on file   Other Topics Concern     " Parent/sibling w/ CABG, MI or angioplasty before 65F 55M? Yes     Comment: father MI - unknown age    Social History Narrative     Not on file       FAMILY HISTORY: Reviewed in EMR      REVIEW OF SYSTEMS: Positive for that noted in past medical history and history of present illness and otherwise reviewed in EMR     PHYSICAL EXAM:    Adult male in no acute distress. Articulates and communicates with normal affect.  Respirations even and unlabored  Focused upper extremity exam: Skin intact. No erythema. Sensation intact all dermatomes into the hand to light touch. EPL, FPL, and Intrinsics intact. Right shoulder active motion is FE to 170, ER at side to 45, and IR to T12. Left shoulder active motion is FE to 90, ER to (-)5, and IR to GT.  On the left, pain with most provocative tests including Neer and Dixon. No pain on palpation over the AC joint. Does have pain on palpation over the long head of the biceps. FE and ER strength 4/5 limited by pain    IMAGIN views of the left shoulder obtained today demonstrate near complete loss of the glenoid humeral joint space with a humeral osteophyte and subchondral sclerosis.  These findings are consistent with end-stage coronal humeral arthrosis.  Left shoulder MRI done 10/21/19 is reviewed and I agree with the radiology impression below:  IMPRESSION:  1. Moderate osteoarthrosis at the left shoulder acromioclavicular joint.  2. Marked osteoarthrosis at the left shoulder glenohumeral joint with areas of high-grade to full-thickness cartilage loss and subchondral edema, greatest along the posterior and the inferior aspect of the glenoid. Marked osteophytic spurring along the inferomedial aspect of the humeral head. Probable joint body in the region of the bicipital groove.  3. Low-grade articular and intrasubstance partial-thickness tear of the left shoulder supraspinatus . No full-thickness tear or tendon retraction involving the left shoulder rotator cuff tendons.  4.  Tendinosis of the left shoulder subscapularis tendon without full-thickness tear or tendon retraction.  5. Fatty infiltration within the left shoulder teres minor muscle without masslike lesion. The remaining muscle bulk is intact.  6. Tendinosis of the intra-articular biceps tendon.      ASSESSMENT:    1. Right end stage Glenohumeral arthritis    PLAN:  I reviewed the imaging with the patient.  We discussed the nature of glenohumeral arthritis. We discussed the non-operative and operative treatment options including the risks and potential benefits of each. These include activity modifications, cortisone injections, and PT for non-operative management, and total shoulder arthroplasty as the operative treatment option. We discussed the expectations for pain relief and/or motion improvement with each option. He notes his pain is very limiting at this time. He would very much like to proceed with surgical treatment. We reviewed postoperative rehab protocols and expectations. He would like to proceed with surgical scheduling and we will work with him in this regard.         Zahida Ames MD      Again, thank you for allowing me to participate in the care of your patient.        Sincerely,        Zahida Ames MD

## 2019-10-28 NOTE — TELEPHONE ENCOUNTER
Procedure: Left total shoulder arthroplasty  Facility: [facility]  Length: 150 minutes  Anesthesia: Choice, Interscalene Block  Post-op appointments needed: 2 weeks provider only, 6 weeks with provider only.  Surgery packet/instructions given to patient?  Yes     Marcos Ford RN

## 2019-10-28 NOTE — PATIENT INSTRUCTIONS
[facility]  [id number] [place] [address]  Phone [phone]  Fax [phone]    SURGICAL INFORMATION & INSTRUCTIONS  Dr. Zahida Ames  Name of Surgery: Left total shoulder arthroplasty    Date of Surgery:     If you need to reschedule/schedule your surgery, please contact Dilia, our surgery scheduler at [facility], at [phone].    Arrival Time:     Time of Surgery:      Please arrive early so that we can prepare you for surgery. If you arrive later than your scheduled arrival time, your surgery may be cancelled.  Please note that scheduled times may change, but you will always be notified if there is a change.       Location of Surgery:     ? [facility], [facility]  [address] [place] [address] [id number] [place] [address], 3rd floor for check-in  Phone [phone]  Fax [phone]  Bring parking ticket with you for validation and reduced parking rate  [email or web address]    Prior to surgery    ? Medical Leave Forms  Please fax any medical leave forms from your employer/school to [phone] (if seen in [facility]). It can take up to 5 business days to complete the forms. We will fax them back to the number listed on the forms, if you would like a copy, please let us know and we will mail a copy to you. Do not bring with on day of surgery as the forms may get lost.    ? Call your insurance company  Ask if you need pre-approval for your surgery.  If pre-approval is needed, please call our surgery scheduler for assistance with the pre-approval process.   If you do not have insurance, please let us know.     ? [facility]  10 days before surgery, call [phone] to register with the surgery center. Have your insurance card ready.     Total Joint Replacement:  - Joint Replacement Class:  If you are scheduled to have a joint replacement, you (and any family/friends that will be helping to care for  you) are expected to attend an educational class at least two weeks prior to your surgery.  To register for the class please call the Patient Learning Center at [phone] or register online at [email or web address]. Classes are held at multiple locations as well as online.  You must know the date of your surgery before you can register for a class (approximate date OK). If registering online, please select hip or knee as surgery type as shoulder has not been made an option at this time.     o This is also a good opportunity to think about where you would like to have physical therapy after your surgery. You may also be able to set up appointments in advance so that everything is ready to go after surgery.    - Antibiotics Prophylaxis:  Certain procedures such as dental cleaning/work, colonoscopies and other surgeries can cause bacteria to enter the bloodstream.  These bacteria can attach to joint replacement devices.  To reduce this risk, you will need to take antibiotics before you have invasive procedures or dental work.  This is known as antibiotic prophylaxis.    - Dental Visit:  You may want to schedule an appointment with your dentist for a cleaning or needed work before your surgery.  We advise no dental work or cleaning until 6 months after your surgery with implants to hip(s), knee(s), or shoulder(s).  Once you are 6 months past your surgery, you will need to take prophylactic (preventative) antibiotics for the rest of your life before having any dental cleaning or work.  If dental work is needed before surgery, make sure everything is completely healed prior to surgery.  It may cause delays or cancellation of surgery if not healed completely. This is also for any other invasive procedures you may have such as a colonoscopy.  Please notify the clinic if you have any questions.    ? Schedule an appointment with a Primary Care Provider for a Pre-Op Physical.  This should be done within 30 days of  surgery  If you do not have a primary care provider, you may call [facility] at [phone], for an appointment.  Please have your office note and any labs or tests faxed to the facility where you are having surgery. Please be sure to request a copy of your pre-op physical and bring it with you on the day of surgery.      Tell your provider if you have any of the following:   - IF you have a pacemaker or an ICD (implanted cardiac defibrillator). If you do, please bring the ID card with you on the day of surgery  - IF you're a smoker. People who smoke have a higher risk of infection after surgery. Ask your provider how you can quit smoking.  - If you have diabetes, work with your provider to control your blood sugar. If its not well-controlled, we may need to delay surgery (or you may have problems healing afterward).  - If your surgeon asks you to see your dentist: You'll need to complete any dental work before surgery. Your dentist must send a letter to your surgery center saying it's ok to do the surgery.    ? Blood Bank Pre-Admission order:    You will need to have a Blood Type and Screen drawn at a [facility] location before your surgery.  Please check your form included in your packet to determine if it needs to be done 1-30 days before surgery or 1-3 days before surgery.    ? Pre-Op Phone Call  You will receive a pre-op phone call 1-3 days before surgery to review your eating and drinking restrictions, review medications, and confirm surgery times.      ? 7-10 days BEFORE surgery  ? Stop taking aspirin, Plavix or aspirin products 10 days before surgery or as directed by your doctor.  ? Stop taking non-steroidal anti-inflammatory medications (naproxen/Aleve, ibuprofen/Advil/Motrin, celecoxib/Celebrex, meloxicam/Mobic) 3 days before surgery or as directed by your surgeon.  This will reduce the risk of bleeding during surgery.  ? Stop taking fish oil (Omega-3-fatty acid) 1 week before  surgery.  ? It is OK to take acetaminophen (Tylenol) up until 2 hours prior to surgery.  ? Take prescription medications as directed by your doctor.  Discuss which medications to take or hold prior to your surgery, with your primary care doctor.   ? If you have diabetes, ask your primary care doctor or endocrinologist how you should take your medication(s).    ? 24 hours BEFORE surgery  Stop drinking alcohol (beer, wine, liquor) at least 24-hours before and after your surgery.     ? Evening BEFORE surgery  - You may eat a normal meal the night before surgery, but eat nothing after midnight.     - Take a shower - to help wash away bacteria (germs) from your skin.  It s normal to have bacteria on your skin and skin protects us from these germs.  When you have surgery, we cut the skin.  Sometimes germs get into the cuts and cause infection (illness caused by germs).  By following the showering instructions and using the special soap, you will lower the number of germs on your skin.  This decreases your chance of an infection.    - Buy or get 8 ounces of antiseptic surgical soap called 4% CHG.  Common brands of this soap are Hibiclens and Exidine.    - You can find it this soap at your local pharmacy, clinic or retail store.  If you have trouble finding it, ask your pharmacist to help you find the right substitute.    - Do not shave within 12 inches of your incision (surgical cut) area for at least 3 days before surgery.  Shaving can make small cuts in the skin. This puts you at a higher risk of infection.    Items you will need for each shower:   - Newly washed towel  - 4 ounces of one of the recommended soaps    Follow these instructions, the evening before surgery  - Wash your hair and body with your regular shampoo and soap. Make sure you rinse the shampoo and soap from your hair and body.  - Using clean hands, apply about 2 ounces of soap gently on your skin from the neck to your toes. Use on your groin area last.  Do not use this soap on your face or head. If you get any soap in your eyes, ears or mouth, rinse right away.  - Once the soap has been on your skin for at least 1 minute, rinse off completely and repeat washing with the surgical soap one more time.  - Rinse well and dry off using a clean towel.  If you feel any tingling, itching or other irritation, rinse right away. It is normal to feel some coolness on the skin after using the antiseptic soap. Your skin may feel a bit dry after the shower, but do not use any lotions, creams or moisturizers. Do not use hair spray or other products in your hair.  - Dress in freshly washed clothes or pajamas. Use fresh pillowcases and sheets on your bed.    ? Day of Surgery  - You may drink clear liquids up to 2 hours before surgery or as directed by your surgeon.  Clear liquids include: Water, Pedialyte, Gatorade, apple juice and liquids you can read through. Please avoid liquids that are red or orange in color.   - Do NOT drink: milk, coffee, liquids that have pulp, orange juice, and lemonade or tomato juice.   - Do NOT chew gum, chew tobacco or suck on hard candy.    - Take another shower          Follow these instructions:      - Wash your hair and body with your regular shampoo and soap. Make sure you rinse the shampoo and soap from your hair and body.  - Using clean hands, apply about 2 ounces of soap gently on your skin from the neck to your toes. Use on your groin area last. Do not use this soap on your face or head. If you get any soap in your eyes, ears or mouth, rinse right away.  - Once the soap has been on your skin for at least 1 minute, rinse off completely and repeat washing with the surgical soap one more time.  - Rinse well and dry off using a clean towel.  If you feel any tingling, itching or other irritation, rinse right away. It is normal to feel some coolness on the skin after using the antiseptic soap. Your skin may feel a bit dry after the shower, but do not  use any lotions, creams or moisturizers. Do not use hair spray or other products in your hair.  - Dress in freshly washed clothes.  - Do not wear deodorant, cologne, lotion, makeup, nail polish or jewelry to surgery.    ? If there is any change in your health PRIOR to your surgery, please contact your surgeon's office.  Such as a fever, body aches, fatigue, any flu-like symptoms, rash, or any new injury to related body part.    ? Arrange for someone to drive you home after surgery.    will need to be a responsible adult (18 years or older) that will provide transportation to and from surgery and stay in the waiting room during your surgery. You may not drive yourself or take public transportation to and from surgery.    ? Arrange for someone to stay with you for 24 hours after you go home.   This person must be a responsible adult (18 years or older).    ? Bring these items to the hospital/surgery center:   ? Insurance card(s)  ? Money for parking and co-pays, if needed  ? A list of all the medications you take, including vitamins, minerals, herbs and over the counter medications.    ? A copy of your Advance Health Care Directive, if you have one.  This tells us what treatment(s) you would or would not want, and who would make health care decisions, if you could no longer speak for yourself.    ? A case for glasses, contact lenses, hearing aids or dentures.   ? Your inhaler or CPAP machine, if you use these at home    ? Leave extra cash, jewelry and other valuables at home.       ? Other information:   Sleep Apnea: Let your nurse know if you have a history of sleep apnea, only if you are having surgery at the [facility].    When you arrive for surgery  When you get to the surgery center/hospital, you will:  - Check in. If you are under age 18, you must be with a parent or legal guardian.  - Sign consent forms, if you haven t already. These forms state that you know the risks and benefits of  surgery. When you sign the forms, you give us permission to do the surgery. Do not sign them unless you understand what will happen during and after your surgery. If you have any questions about your surgery, ask to speak with your doctor before you sign the forms. If you don t understand the answers, ask again.  - Receive a copy of the Patient s Bill of Rights. If you do not receive a copy, please ask for one.  - Change into hospital clothes. Your belongings will be placed in a bag. We will return them to you after surgery.  - Meet with the anesthesia provider. He or she will tell you what kind of anesthesia (medicine) will be used to keep you comfortable during surgery.  - Remember: it s okay to remind doctors and nurses to wash their hands before touching you.  - In most cases, your surgeon will use a marker to write his or her initials on the surgery site. This ensures that the exact site is operated on.  - For safety reasons, we will ask you the same questions many times. For example, we may ask your name and birth date over and over again.  - Friends and family can stay with you until it s time for surgery. While you re in surgery, they will be in the waiting area. Please note that cell phones are not allowed in some patient care areas.  - If you have questions about what will happen in the operating room, talk to your care team.  - You will meet with an anesthesiologist, before your surgery.  He or she may reference types of anesthesia commonly used for surgeries:   o General:  This involves the use of an IV for injection of medication and anesthesia. You are put into a sleep and have a breathing tube to assist you with breathing.  o Sedation:  You are asleep, but not so deply that you need a breathing tube.   o Local or Regional: a nerve is injected to numb the surgical area.  o Spinal: you are numbed from the waist down with medicine injected into your back.  o Femoral Nerve Block:  Anesthesia injected into  the groin of leg which you are having surgery on.      After surgery  We will move you to a recovery room, where we will watch you closely. If you have any pain or discomfort, tell your nurse. He or she will try to make you comfortable.    - If you are staying overnight, we will move you to your hospital room after you are awake.  - If you are going home, we will move you to another room. Friends and family may be able to join you. The length of time you spend in recovery depend on the type of medicine you received, your medical condition, the type of surgery you had, or your response to the anesthesia given during your procedure.  - When you are discharged from the recovery room, the nurses will review instructions with you and your caregiver.  - Please wash your hands every time you touch the wound or change bandages or dressings.  - Do not submerge the wound in water.  You may not use a bathtub or hot tub until the wound is closed. The wait time frame is generally 2-3 weeks, but any open area can be a source of incoming bacteria, so it is better to be on the safe side and avoid water submersion until your wound is fully healed.  Keep all dressings clean and dry.   - If you had surgery on your arm or leg, elevate it as much as possible to help reduce swelling.  - You may put ice on the surgical area for comfort, keeping ice on area for up to 20 minutes then off for 40 minutes.  You may do this the first few days after surgery to help reduce pain and swelling.   - Many surgical wounds will have small white strips of tape on them called steri-strips. These are to help support the incision and surrounding skin. Do not remove these. The edges will curl and fall off on their own, typically within 7-10 days with normal showering and hygiene.   - Drink at least 8-10 glasses of liquid each day to help your body heal.  - Keep your lungs clear by coughing and taking deep breaths every couple hours.  This is especially  important the first 48 hours after surgery.    - Notify your doctor if you have any of the following:   o Fever of 101 F or higher  o Numbness and/or tingling  o Increased pain, redness or swelling  o Drainage from wound  o Prolonged or uncontrolled bleeding  o Difficulty with movement    ? Physical Therapy (to be determined by Dr. Ames post surgery)  Think about where you would like to have physical therapy (PT) after your surgery. You will be instructed by your surgical team on when to start PT after surgery. If you have questions regarding this, please contact the orthopedic clinic.    Follow-up Appointments, in Clinic  If you don't already have an appointment scheduled, please call to set up an appointment at [phone].      ? Post-Op appointments with provider. (2 and 6 weeks post op)    Dealing with pain  A nurse will check your comfort level often during your stay. He or she will work with you to manage your pain.  It s expected that you will have pain after surgery.  Our goal is to reduce or minimize pain by:   - Medicine doesn t work the same for everyone. If your medicine isn t working, tell your nurse. There may be other medicines or treatments we can try.  - Medication Refills.  If you need refills on your pain medication, please call the clinic as soon as possible.  It may take 72-business hours to obtain a refill.  Refills must be picked up at check-in 2, [facility] or mailed to a pharmacy of your choice.    - It is expected that you will wean off the pain medications in a timely manner.   - Constipation is a common side effect of pain medication, decreased activity and anesthesia from surgery.  Take a stool softener as prescribed by your doctor at the time of discharge.  You may also use over the counter medications as needed.  Be sure to increase your fiber (fruits and vegetables) and your water intake.      Please call the clinic at [phone], if you experience any  problems or have questions.  If you are having an emergency, always call 911 or seek immediate evaluation at the Emergency Room.    Thank you for selecting [facility] for your care!  ---------------------------------------------

## 2019-11-18 ENCOUNTER — TELEPHONE (OUTPATIENT)
Dept: ORTHOPEDICS | Facility: CLINIC | Age: 59
End: 2019-11-18

## 2019-11-18 NOTE — TELEPHONE ENCOUNTER
Attempted to return call to patient regarding forms.  Patient may mail forms or drop them off.    Mailing address is:  [facility]  Attn: Zulema Ames  [address],   [place] [address]    Left message for patient to return a call to let him know he can drop off forms or mail them.

## 2019-11-18 NOTE — TELEPHONE ENCOUNTER
[facility] Call Center    Phone Message    May a detailed message be left on voicemail: yes    Reason for Call: Form or Letter   Type or form/letter needing completion: disability forms   Provider: Kwaku    Patient has the disability forms. He is wondering how to get them to Dr. Ames. To mail them or bring them in?    [phone] - to reach the patient.    Please advise.    Action Taken: Message routed to:  Adult Clinics: Orthopedics p [id number]

## 2019-11-25 NOTE — TELEPHONE ENCOUNTER
Patient dropped of [employer] forms for his wife as well as disability forms for himself to be completed for his upcoming surgery on 1/7/2020.

## 2019-12-04 NOTE — TELEPHONE ENCOUNTER
All forms signed and faxed, copy placed in [employer] scanning bin and copy kept in clinic temporarily. Also mailed copies of Pt's and his wife's forms to their home address per their request.    Marcos Ford RN

## 2019-12-20 ENCOUNTER — OFFICE VISIT (OUTPATIENT)
Dept: PEDIATRICS | Facility: CLINIC | Age: 59
End: 2019-12-20
Payer: COMMERCIAL

## 2019-12-20 ENCOUNTER — TELEPHONE (OUTPATIENT)
Dept: PEDIATRICS | Facility: CLINIC | Age: 59
End: 2019-12-20

## 2019-12-20 VITALS
DIASTOLIC BLOOD PRESSURE: 84 MMHG | HEART RATE: 77 BPM | BODY MASS INDEX: 44.1 KG/M2 | TEMPERATURE: 97.9 F | WEIGHT: 315 LBS | OXYGEN SATURATION: 96 % | SYSTOLIC BLOOD PRESSURE: 137 MMHG | HEIGHT: 71 IN

## 2019-12-20 DIAGNOSIS — E66.01 MORBID OBESITY WITH BMI OF 40.0-44.9, ADULT (H): ICD-10-CM

## 2019-12-20 DIAGNOSIS — I10 BENIGN ESSENTIAL HYPERTENSION: ICD-10-CM

## 2019-12-20 DIAGNOSIS — R35.0 URINARY FREQUENCY: ICD-10-CM

## 2019-12-20 DIAGNOSIS — I45.10 INCOMPLETE RBBB: ICD-10-CM

## 2019-12-20 DIAGNOSIS — Z01.818 PREOP GENERAL PHYSICAL EXAM: Primary | ICD-10-CM

## 2019-12-20 DIAGNOSIS — M19.012 ARTHRITIS OF SHOULDER REGION, LEFT: ICD-10-CM

## 2019-12-20 DIAGNOSIS — R06.83 SNORING: ICD-10-CM

## 2019-12-20 LAB
ANION GAP SERPL CALCULATED.3IONS-SCNC: 4 MMOL/L (ref 3–14)
BASOPHILS # BLD AUTO: 0.1 10E9/L (ref 0–0.2)
BASOPHILS NFR BLD AUTO: 0.7 %
BUN SERPL-MCNC: 22 MG/DL (ref 7–30)
CALCIUM SERPL-MCNC: 9.2 MG/DL (ref 8.5–10.1)
CHLORIDE SERPL-SCNC: 106 MMOL/L (ref 94–109)
CO2 SERPL-SCNC: 30 MMOL/L (ref 20–32)
CREAT SERPL-MCNC: 0.83 MG/DL (ref 0.66–1.25)
DIFFERENTIAL METHOD BLD: NORMAL
EOSINOPHIL # BLD AUTO: 0.2 10E9/L (ref 0–0.7)
EOSINOPHIL NFR BLD AUTO: 3.1 %
ERYTHROCYTE [DISTWIDTH] IN BLOOD BY AUTOMATED COUNT: 13.2 % (ref 10–15)
GFR SERPL CREATININE-BSD FRML MDRD: >90 ML/MIN/{1.73_M2}
GLUCOSE SERPL-MCNC: 118 MG/DL (ref 70–99)
HCT VFR BLD AUTO: 43.1 % (ref 40–53)
HGB BLD-MCNC: 14.3 G/DL (ref 13.3–17.7)
IMM GRANULOCYTES # BLD: 0 10E9/L (ref 0–0.4)
IMM GRANULOCYTES NFR BLD: 0.4 %
LYMPHOCYTES # BLD AUTO: 1.3 10E9/L (ref 0.8–5.3)
LYMPHOCYTES NFR BLD AUTO: 18.6 %
MCH RBC QN AUTO: 28 PG (ref 26.5–33)
MCHC RBC AUTO-ENTMCNC: 33.2 G/DL (ref 31.5–36.5)
MCV RBC AUTO: 85 FL (ref 78–100)
MONOCYTES # BLD AUTO: 0.5 10E9/L (ref 0–1.3)
MONOCYTES NFR BLD AUTO: 6.9 %
NEUTROPHILS # BLD AUTO: 4.8 10E9/L (ref 1.6–8.3)
NEUTROPHILS NFR BLD AUTO: 70.3 %
PLATELET # BLD AUTO: 220 10E9/L (ref 150–450)
POTASSIUM SERPL-SCNC: 3.5 MMOL/L (ref 3.4–5.3)
RBC # BLD AUTO: 5.1 10E12/L (ref 4.4–5.9)
SODIUM SERPL-SCNC: 140 MMOL/L (ref 133–144)
WBC # BLD AUTO: 6.8 10E9/L (ref 4–11)

## 2019-12-20 PROCEDURE — 86900 BLOOD TYPING SEROLOGIC ABO: CPT | Performed by: FAMILY MEDICINE

## 2019-12-20 PROCEDURE — 36415 COLL VENOUS BLD VENIPUNCTURE: CPT | Performed by: FAMILY MEDICINE

## 2019-12-20 PROCEDURE — 86850 RBC ANTIBODY SCREEN: CPT | Performed by: FAMILY MEDICINE

## 2019-12-20 PROCEDURE — 86900 BLOOD TYPING SEROLOGIC ABO: CPT | Performed by: ORTHOPAEDIC SURGERY

## 2019-12-20 PROCEDURE — 80048 BASIC METABOLIC PNL TOTAL CA: CPT | Performed by: FAMILY MEDICINE

## 2019-12-20 PROCEDURE — 99215 OFFICE O/P EST HI 40 MIN: CPT | Performed by: FAMILY MEDICINE

## 2019-12-20 PROCEDURE — 86850 RBC ANTIBODY SCREEN: CPT | Performed by: ORTHOPAEDIC SURGERY

## 2019-12-20 PROCEDURE — 85025 COMPLETE CBC W/AUTO DIFF WBC: CPT | Performed by: FAMILY MEDICINE

## 2019-12-20 PROCEDURE — 86901 BLOOD TYPING SEROLOGIC RH(D): CPT | Performed by: ORTHOPAEDIC SURGERY

## 2019-12-20 PROCEDURE — 93000 ELECTROCARDIOGRAM COMPLETE: CPT | Performed by: FAMILY MEDICINE

## 2019-12-20 PROCEDURE — 86901 BLOOD TYPING SEROLOGIC RH(D): CPT | Performed by: FAMILY MEDICINE

## 2019-12-20 ASSESSMENT — PAIN SCALES - GENERAL: PAINLEVEL: SEVERE PAIN (7)

## 2019-12-20 ASSESSMENT — MIFFLIN-ST. JEOR: SCORE: 2391.85

## 2019-12-20 NOTE — PATIENT INSTRUCTIONS
Get the labs today    Schedule for sleep study  Schedule for physical, fasting labs in 6 months    Hold Aspirin, 10 days before surgery  Hold omega 3 fish oil from today  Hold IBUPROFEN for atleast 3-43 days before the surgery  Hold hydrochlorothiazide 25mg on the morning of surgery  Start taking hydrochlorothiazide 25mg in the morning and LOSARTAN 100mg at night          Before Your Surgery    Call your surgeon if there is any change in your health. This includes signs of a cold or flu (such as a sore throat, runny nose, cough, rash or fever).    Do not smoke, drink alcohol or take over the counter medicine (unless your surgeon or primary care doctor tells you to) for the 24 hours before and after surgery.    If you take prescribed drugs: Follow your doctor s orders about which medicines to take and which to stop until after surgery.    Eating and drinking prior to surgery: follow the instructions from your surgeon    Take a shower or bath the night before surgery. Use the soap your surgeon gave you to gently clean your skin. If you do not have soap from your surgeon, use your regular soap. Do not shave or scrub the surgery site.  Wear clean pajamas and have clean sheets on your bed.

## 2019-12-20 NOTE — RESULT ENCOUNTER NOTE
Please inform patient of normal test results on potassium, kidney functions and hemoglobin, this is good

## 2019-12-20 NOTE — TELEPHONE ENCOUNTER
----- Message from Uli Owens MD sent at 12/20/2019 12:27 PM CST -----  Please inform patient of normal test results on potassium, kidney functions and hemoglobin, this is good

## 2019-12-21 LAB
ABO + RH BLD: NORMAL
BLD GP AB SCN SERPL QL: NORMAL
BLD GP AB SCN SERPL QL: NORMAL
BLOOD BANK CMNT PATIENT-IMP: NORMAL
SPECIMEN EXP DATE BLD: NORMAL

## 2019-12-27 ENCOUNTER — TELEPHONE (OUTPATIENT)
Dept: ORTHOPEDICS | Facility: CLINIC | Age: 59
End: 2019-12-27

## 2019-12-27 NOTE — TELEPHONE ENCOUNTER
Pt states that there needs to be an end date to his disability even if it is actually unknown. We will put in 12 weeks off and release him back to work sooner as able. Pt is agreeable with this plan. Pt will bring in a new form to fill out for us to fax in (number on bottom of form). Return date of 4/1/20. Copy of form on my desk in case I am not here. Pt coming in Monday, 12/30/19.    Marcos Ford RN

## 2019-12-27 NOTE — TELEPHONE ENCOUNTER
"[facility] Call Center    Phone Message    May a detailed message be left on voicemail: yes    Reason for Call: Form or Letter   Patients disability form was not filled out completely. It states \"undetermined\" for the disability end date and that is not acceptable for his job. Please contact to discuss. Patient very anxious he is not going to be paid because this form wasn't filled out correctly and wants a call back today. Does not want to wait more than 24 hours for a call back. He will drop of the form to expedite.      Action Taken: Message routed to:  Adult Clinics: Orthopedics p [id number]  "

## 2020-01-02 NOTE — TELEPHONE ENCOUNTER
Patient came in with old form.  Added new date for 4/1/20 and signed the new date.  He had no other questions.  Batsheva Espinoaz RN

## 2020-01-03 RX ORDER — IBUPROFEN 200 MG
200 TABLET ORAL EVERY 4 HOURS PRN
Status: ON HOLD | COMMUNITY
End: 2020-01-08

## 2020-01-04 ENCOUNTER — ANESTHESIA EVENT (OUTPATIENT)
Dept: SURGERY | Facility: CLINIC | Age: 60
End: 2020-01-04
Payer: COMMERCIAL

## 2020-01-06 ASSESSMENT — LIFESTYLE VARIABLES: TOBACCO_USE: 0

## 2020-01-06 NOTE — ANESTHESIA PREPROCEDURE EVALUATION
Anesthesia Pre-Procedure Evaluation    Patient: Foreign Pond   MRN:     [id number] Gender:   male   Age:    59 year old :      1960        Preoperative Diagnosis: Arthritis of shoulder region, left [M19.012]   Procedure(s):  Left total shoulder arthroplasty     Past Medical History:   Diagnosis Date     Asthma      BPH (benign prostatic hyperplasia)      Hypercholesteremia      Hypertension      Mild intermittent asthma 8/15/2006    Patient states he does not have asthma.  Doesn't use albuterol ever.         Past Surgical History:   Procedure Laterality Date     COLONOSCOPY  2006    repeat in 10 years     COLONOSCOPY N/A 2016    Procedure: COLONOSCOPY;  Surgeon: Delonte Miller MD;  Location: [facility]     CREATE EARDRUM OPENING,LOCAL ANESTH  age 15     HC ECP WITH CATARACT SURGERY       LAPAROSCOPIC CHOLECYSTECTOMY  2012    Procedure: LAPAROSCOPIC CHOLECYSTECTOMY;  Laparoscopic Cholecystectomy & repair of umbilical hernia;  Surgeon: Delonte Miller MD;  Location: [facility]     LASIK BILATERAL       VASECTOMY Bilateral 2018    Procedure: VASECTOMY;  Bilateral vasectomy;  Surgeon: Kavon Everett MD;  Location:  OR          Anesthesia Evaluation     . Pt has had prior anesthetic. Type: General and MAC (easy with carvajal 2)    No history of anesthetic complications          ROS/MED HX    ENT/Pulmonary:     (+)JAZMYNE risk factors snores loudly, hypertension, obese, asthma , . .   (-) tobacco use   Neurologic:  - neg neurologic ROS     Cardiovascular:     (+) Dyslipidemia, hypertension----. : . . . :. . Previous cardiac testing date:results:date: results:ECG reviewed date: results:Incomplete RBBB date: results:          METS/Exercise Tolerance:     Hematologic:  - neg hematologic  ROS       Musculoskeletal:   (+) arthritis,  -       GI/Hepatic:  - neg GI/hepatic ROS       Renal/Genitourinary:  - ROS Renal section negative   (+) BPH,       Endo:     (+) Obesity, .      Psychiatric:     (+)  psychiatric history anxiety      Infectious Disease:  - neg infectious disease ROS       Malignancy:      - no malignancy   Other:                         PHYSICAL EXAM:   Mental Status/Neuro: A/A/O   Airway: Facies: Feasible  Mallampati: III  Mouth/Opening: Full  TM distance: > 6 cm  Neck ROM: Full   Respiratory: Auscultation: CTAB     Resp. Rate: Normal     Resp. Effort: Normal      CV: Rhythm: Regular  Rate: Age appropriate  Heart: Normal Sounds  Edema: None   Comments:      Dental: Normal Dentition                LABS:  CBC:   Lab Results   Component Value Date    WBC 6.8 12/20/2019    WBC 5.7 08/17/2018    HGB 14.3 12/20/2019    HGB 15.2 08/17/2018    HCT 43.1 12/20/2019    HCT 45.0 08/17/2018     12/20/2019     08/17/2018     BMP:   Lab Results   Component Value Date     12/20/2019     09/14/2019    POTASSIUM 3.5 12/20/2019    POTASSIUM 3.8 09/14/2019    CHLORIDE 106 12/20/2019    CHLORIDE 106 09/14/2019    CO2 30 12/20/2019    CO2 31 09/14/2019    BUN 22 12/20/2019    BUN 21 09/14/2019    CR 0.83 12/20/2019    CR 0.84 09/14/2019     (H) 12/20/2019    GLC 96 09/14/2019     COAGS: No results found for: PTT, INR, FIBR  POC: No results found for: BGM, HCG, HCGS  OTHER:   Lab Results   Component Value Date    MAITE 9.2 12/20/2019    ALBUMIN 4.0 08/17/2018    PROTTOTAL 7.5 08/17/2018    ALT 27 08/17/2018    AST 22 08/17/2018    ALKPHOS 59 08/17/2018    BILITOTAL 0.6 08/17/2018    LIPASE 86 09/17/2012    AMYLASE 68 09/17/2012    TSH 2.01 08/17/2018        Preop Vitals    BP Readings from Last 3 Encounters:   12/20/19 137/84   10/28/19 (!) 140/94   07/26/19 150/90    Pulse Readings from Last 3 Encounters:   12/20/19 77   10/28/19 77   07/26/19 68      Resp Readings from Last 3 Encounters:   07/26/19 14   02/21/18 18   12/26/17 16    SpO2 Readings from Last 3 Encounters:   12/20/19 96%   10/28/19 95%   07/26/19 97%      Temp Readings from Last 1 Encounters:   12/20/19 36.6  C (97.9  F)  "(Oral)    Ht Readings from Last 1 Encounters:   12/20/19 1.791 m (5' 10.5\")      Wt Readings from Last 1 Encounters:   12/20/19 (!) 155.3 kg (342 lb 4.8 oz)    Estimated body mass index is 48.42 kg/m  as calculated from the following:    Height as of 12/20/19: 1.791 m (5' 10.5\").    Weight as of 12/20/19: 155.3 kg (342 lb 4.8 oz).     LDA:  Peripheral IV 12/12/16 Right Hand (Active)   Number of days: 1120       Peripheral IV 10/17/17 Left Upper forearm (Active)   Number of days: 811        Assessment:   ASA SCORE: 3    H&P: History and physical reviewed and following examination; no interval change.   Smoking Status:  Non-Smoker/Unknown        Plan:   Anes. Type:  General   Pre-Medication: Acetaminophen   Induction:  IV (Standard)   Airway: ETT; Oral   Access/Monitoring: PIV   Maintenance: Balanced     Postop Plan:   Postop Pain: Opioids  Postop Sedation/Airway: Not planned  Disposition: Inpatient/Admit     PONV Management:   Adult Risk Factors:, Non-Smoker, Postop Opioids   Prevention: Ondansetron, Dexamethasone                   Adrian العلي DO  "

## 2020-01-07 ENCOUNTER — ANESTHESIA (OUTPATIENT)
Dept: SURGERY | Facility: CLINIC | Age: 60
End: 2020-01-07
Payer: COMMERCIAL

## 2020-01-07 ENCOUNTER — HOSPITAL ENCOUNTER (INPATIENT)
Facility: CLINIC | Age: 60
LOS: 2 days | Discharge: HOME | End: 2020-01-09
Attending: ORTHOPAEDIC SURGERY | Admitting: ORTHOPAEDIC SURGERY
Payer: COMMERCIAL

## 2020-01-07 DIAGNOSIS — M19.012 ARTHRITIS OF SHOULDER REGION, LEFT: ICD-10-CM

## 2020-01-07 DIAGNOSIS — Z96.612 STATUS POST TOTAL SHOULDER REPLACEMENT, LEFT: Primary | ICD-10-CM

## 2020-01-07 LAB
ABO + RH BLD: NORMAL
ABO + RH BLD: NORMAL
BLD GP AB SCN SERPL QL: NORMAL
BLOOD BANK CMNT PATIENT-IMP: NORMAL
GLUCOSE BLDC GLUCOMTR-MCNC: 96 MG/DL (ref 70–99)
GLUCOSE SERPL-MCNC: 101 MG/DL (ref 70–99)
POTASSIUM SERPL-SCNC: 4.2 MMOL/L (ref 3.4–5.3)
SPECIMEN EXP DATE BLD: NORMAL

## 2020-01-07 PROCEDURE — 71000015 ZZH RECOVERY PHASE 1 LEVEL 2 EA ADDTL HR: Performed by: ORTHOPAEDIC SURGERY

## 2020-01-07 PROCEDURE — 37000009 ZZH ANESTHESIA TECHNICAL FEE, EACH ADDTL 15 MIN: Performed by: ORTHOPAEDIC SURGERY

## 2020-01-07 PROCEDURE — 27210794 ZZH OR GENERAL SUPPLY STERILE: Performed by: ORTHOPAEDIC SURGERY

## 2020-01-07 PROCEDURE — 25000566 ZZH SEVOFLURANE, EA 15 MIN: Performed by: ORTHOPAEDIC SURGERY

## 2020-01-07 PROCEDURE — 99207 ZZC CDG-HISTORY COMP: MEETS EXP. PROBLEM FOCUSED - DOWN CODED LACK OF HPI: CPT | Performed by: INTERNAL MEDICINE

## 2020-01-07 PROCEDURE — 25000128 H RX IP 250 OP 636: Performed by: ORTHOPAEDIC SURGERY

## 2020-01-07 PROCEDURE — 12000001 ZZH R&B MED SURG/OB UMMC

## 2020-01-07 PROCEDURE — 25000125 ZZHC RX 250: Performed by: STUDENT IN AN ORGANIZED HEALTH CARE EDUCATION/TRAINING PROGRAM

## 2020-01-07 PROCEDURE — C1713 ANCHOR/SCREW BN/BN,TIS/BN: HCPCS | Performed by: ORTHOPAEDIC SURGERY

## 2020-01-07 PROCEDURE — 99232 SBSQ HOSP IP/OBS MODERATE 35: CPT | Performed by: INTERNAL MEDICINE

## 2020-01-07 PROCEDURE — 25000125 ZZHC RX 250: Performed by: ANESTHESIOLOGY

## 2020-01-07 PROCEDURE — 36415 COLL VENOUS BLD VENIPUNCTURE: CPT | Performed by: ANESTHESIOLOGY

## 2020-01-07 PROCEDURE — 27110028 ZZH OR GENERAL SUPPLY NON-STERILE: Performed by: ORTHOPAEDIC SURGERY

## 2020-01-07 PROCEDURE — 86900 BLOOD TYPING SEROLOGIC ABO: CPT | Performed by: ORTHOPAEDIC SURGERY

## 2020-01-07 PROCEDURE — 25000125 ZZHC RX 250: Performed by: ORTHOPAEDIC SURGERY

## 2020-01-07 PROCEDURE — 0RRK0JZ REPLACEMENT OF LEFT SHOULDER JOINT WITH SYNTHETIC SUBSTITUTE, OPEN APPROACH: ICD-10-PCS | Performed by: ORTHOPAEDIC SURGERY

## 2020-01-07 PROCEDURE — 25800030 ZZH RX IP 258 OP 636: Performed by: NURSE ANESTHETIST, CERTIFIED REGISTERED

## 2020-01-07 PROCEDURE — 25000128 H RX IP 250 OP 636: Performed by: STUDENT IN AN ORGANIZED HEALTH CARE EDUCATION/TRAINING PROGRAM

## 2020-01-07 PROCEDURE — 25000128 H RX IP 250 OP 636: Performed by: ANESTHESIOLOGY

## 2020-01-07 PROCEDURE — 36000062 ZZH SURGERY LEVEL 4 1ST 30 MIN - UMMC: Performed by: ORTHOPAEDIC SURGERY

## 2020-01-07 PROCEDURE — 86850 RBC ANTIBODY SCREEN: CPT | Performed by: ORTHOPAEDIC SURGERY

## 2020-01-07 PROCEDURE — 27211024 ZZHC OR SUPPLY OTHER OPNP: Performed by: ORTHOPAEDIC SURGERY

## 2020-01-07 PROCEDURE — 84132 ASSAY OF SERUM POTASSIUM: CPT | Performed by: ANESTHESIOLOGY

## 2020-01-07 PROCEDURE — 25800030 ZZH RX IP 258 OP 636: Performed by: STUDENT IN AN ORGANIZED HEALTH CARE EDUCATION/TRAINING PROGRAM

## 2020-01-07 PROCEDURE — 25000132 ZZH RX MED GY IP 250 OP 250 PS 637: Performed by: ANESTHESIOLOGY

## 2020-01-07 PROCEDURE — 27810169 ZZH OR IMPLANT GENERAL: Performed by: ORTHOPAEDIC SURGERY

## 2020-01-07 PROCEDURE — 82947 ASSAY GLUCOSE BLOOD QUANT: CPT | Performed by: ANESTHESIOLOGY

## 2020-01-07 PROCEDURE — 37000008 ZZH ANESTHESIA TECHNICAL FEE, 1ST 30 MIN: Performed by: ORTHOPAEDIC SURGERY

## 2020-01-07 PROCEDURE — 86901 BLOOD TYPING SEROLOGIC RH(D): CPT | Performed by: ORTHOPAEDIC SURGERY

## 2020-01-07 PROCEDURE — 40000170 ZZH STATISTIC PRE-PROCEDURE ASSESSMENT II: Performed by: ORTHOPAEDIC SURGERY

## 2020-01-07 PROCEDURE — 36000064 ZZH SURGERY LEVEL 4 EA 15 ADDTL MIN - UMMC: Performed by: ORTHOPAEDIC SURGERY

## 2020-01-07 PROCEDURE — 25800025 ZZH RX 258: Performed by: ORTHOPAEDIC SURGERY

## 2020-01-07 PROCEDURE — 25000132 ZZH RX MED GY IP 250 OP 250 PS 637: Performed by: STUDENT IN AN ORGANIZED HEALTH CARE EDUCATION/TRAINING PROGRAM

## 2020-01-07 PROCEDURE — 36415 COLL VENOUS BLD VENIPUNCTURE: CPT | Performed by: ORTHOPAEDIC SURGERY

## 2020-01-07 PROCEDURE — 71000014 ZZH RECOVERY PHASE 1 LEVEL 2 FIRST HR: Performed by: ORTHOPAEDIC SURGERY

## 2020-01-07 PROCEDURE — 00000146 ZZHCL STATISTIC GLUCOSE BY METER IP

## 2020-01-07 PROCEDURE — C9290 INJ, BUPIVACAINE LIPOSOME: HCPCS | Performed by: ANESTHESIOLOGY

## 2020-01-07 PROCEDURE — 25000128 H RX IP 250 OP 636: Performed by: NURSE ANESTHETIST, CERTIFIED REGISTERED

## 2020-01-07 DEVICE — BONE CEMENT SIMPLEX W/TOBRAMYCIN 6197-9-001: Type: IMPLANTABLE DEVICE | Site: SHOULDER | Status: FUNCTIONAL

## 2020-01-07 DEVICE — IMPLANTABLE DEVICE
Type: IMPLANTABLE DEVICE | Site: SHOULDER | Status: FUNCTIONAL
Brand: COMPREHENSIVE SHOULDER SYSTEM

## 2020-01-07 RX ORDER — FENTANYL CITRATE 50 UG/ML
25-50 INJECTION, SOLUTION INTRAMUSCULAR; INTRAVENOUS
Status: DISCONTINUED | OUTPATIENT
Start: 2020-01-07 | End: 2020-01-07 | Stop reason: HOSPADM

## 2020-01-07 RX ORDER — VANCOMYCIN HYDROCHLORIDE 1 G/20ML
INJECTION, POWDER, LYOPHILIZED, FOR SOLUTION INTRAVENOUS PRN
Status: DISCONTINUED | OUTPATIENT
Start: 2020-01-07 | End: 2020-01-07 | Stop reason: HOSPADM

## 2020-01-07 RX ORDER — ACETAMINOPHEN 325 MG/1
650 TABLET ORAL EVERY 4 HOURS PRN
Status: DISCONTINUED | OUTPATIENT
Start: 2020-01-10 | End: 2020-01-09 | Stop reason: HOSPADM

## 2020-01-07 RX ORDER — SODIUM CHLORIDE, SODIUM LACTATE, POTASSIUM CHLORIDE, CALCIUM CHLORIDE 600; 310; 30; 20 MG/100ML; MG/100ML; MG/100ML; MG/100ML
INJECTION, SOLUTION INTRAVENOUS CONTINUOUS
Status: DISCONTINUED | OUTPATIENT
Start: 2020-01-07 | End: 2020-01-07 | Stop reason: HOSPADM

## 2020-01-07 RX ORDER — ACETAMINOPHEN 325 MG/1
650 TABLET ORAL EVERY 6 HOURS PRN
COMMUNITY

## 2020-01-07 RX ORDER — MAGNESIUM HYDROXIDE 1200 MG/15ML
LIQUID ORAL PRN
Status: DISCONTINUED | OUTPATIENT
Start: 2020-01-07 | End: 2020-01-07 | Stop reason: HOSPADM

## 2020-01-07 RX ORDER — FENTANYL CITRATE 50 UG/ML
INJECTION, SOLUTION INTRAMUSCULAR; INTRAVENOUS PRN
Status: DISCONTINUED | OUTPATIENT
Start: 2020-01-07 | End: 2020-01-07

## 2020-01-07 RX ORDER — DEXAMETHASONE SODIUM PHOSPHATE 4 MG/ML
INJECTION, SOLUTION INTRA-ARTICULAR; INTRALESIONAL; INTRAMUSCULAR; INTRAVENOUS; SOFT TISSUE PRN
Status: DISCONTINUED | OUTPATIENT
Start: 2020-01-07 | End: 2020-01-07

## 2020-01-07 RX ORDER — ONDANSETRON 2 MG/ML
4 INJECTION INTRAMUSCULAR; INTRAVENOUS EVERY 6 HOURS PRN
Status: DISCONTINUED | OUTPATIENT
Start: 2020-01-07 | End: 2020-01-09 | Stop reason: HOSPADM

## 2020-01-07 RX ORDER — PROPOFOL 10 MG/ML
INJECTION, EMULSION INTRAVENOUS PRN
Status: DISCONTINUED | OUTPATIENT
Start: 2020-01-07 | End: 2020-01-07

## 2020-01-07 RX ORDER — GABAPENTIN 100 MG/1
300 CAPSULE ORAL ONCE
Status: COMPLETED | OUTPATIENT
Start: 2020-01-07 | End: 2020-01-07

## 2020-01-07 RX ORDER — DIPHENHYDRAMINE HCL 25 MG
25 CAPSULE ORAL EVERY 6 HOURS PRN
Status: DISCONTINUED | OUTPATIENT
Start: 2020-01-07 | End: 2020-01-09 | Stop reason: HOSPADM

## 2020-01-07 RX ORDER — NALOXONE HYDROCHLORIDE 0.4 MG/ML
.1-.4 INJECTION, SOLUTION INTRAMUSCULAR; INTRAVENOUS; SUBCUTANEOUS
Status: DISCONTINUED | OUTPATIENT
Start: 2020-01-07 | End: 2020-01-09 | Stop reason: HOSPADM

## 2020-01-07 RX ORDER — AMOXICILLIN 250 MG
1 CAPSULE ORAL 2 TIMES DAILY
Status: DISCONTINUED | OUTPATIENT
Start: 2020-01-07 | End: 2020-01-09 | Stop reason: HOSPADM

## 2020-01-07 RX ORDER — ACETAMINOPHEN 160 MG
TABLET,DISINTEGRATING ORAL PRN
Status: DISCONTINUED | OUTPATIENT
Start: 2020-01-07 | End: 2020-01-07 | Stop reason: HOSPADM

## 2020-01-07 RX ORDER — ONDANSETRON 4 MG/1
4 TABLET, ORALLY DISINTEGRATING ORAL EVERY 6 HOURS PRN
Status: DISCONTINUED | OUTPATIENT
Start: 2020-01-07 | End: 2020-01-09 | Stop reason: HOSPADM

## 2020-01-07 RX ORDER — LIDOCAINE 40 MG/G
CREAM TOPICAL
Status: DISCONTINUED | OUTPATIENT
Start: 2020-01-07 | End: 2020-01-09 | Stop reason: HOSPADM

## 2020-01-07 RX ORDER — AMOXICILLIN 250 MG
2 CAPSULE ORAL 2 TIMES DAILY
Status: DISCONTINUED | OUTPATIENT
Start: 2020-01-07 | End: 2020-01-09 | Stop reason: HOSPADM

## 2020-01-07 RX ORDER — TAMSULOSIN HYDROCHLORIDE 0.4 MG/1
0.4 CAPSULE ORAL DAILY
Status: DISCONTINUED | OUTPATIENT
Start: 2020-01-08 | End: 2020-01-09 | Stop reason: HOSPADM

## 2020-01-07 RX ORDER — CEFAZOLIN SODIUM 1 G/3ML
1 INJECTION, POWDER, FOR SOLUTION INTRAMUSCULAR; INTRAVENOUS SEE ADMIN INSTRUCTIONS
Status: DISCONTINUED | OUTPATIENT
Start: 2020-01-07 | End: 2020-01-07 | Stop reason: HOSPADM

## 2020-01-07 RX ORDER — OXYCODONE HYDROCHLORIDE 5 MG/1
5-10 TABLET ORAL
Status: DISCONTINUED | OUTPATIENT
Start: 2020-01-07 | End: 2020-01-09 | Stop reason: HOSPADM

## 2020-01-07 RX ORDER — ASPIRIN 81 MG/1
162 TABLET ORAL DAILY
Status: DISCONTINUED | OUTPATIENT
Start: 2020-01-07 | End: 2020-01-08

## 2020-01-07 RX ORDER — ONDANSETRON 4 MG/1
4 TABLET, ORALLY DISINTEGRATING ORAL EVERY 30 MIN PRN
Status: DISCONTINUED | OUTPATIENT
Start: 2020-01-07 | End: 2020-01-07 | Stop reason: HOSPADM

## 2020-01-07 RX ORDER — BUPIVACAINE HYDROCHLORIDE AND EPINEPHRINE 5; 5 MG/ML; UG/ML
INJECTION, SOLUTION PERINEURAL PRN
Status: DISCONTINUED | OUTPATIENT
Start: 2020-01-07 | End: 2020-01-07

## 2020-01-07 RX ORDER — ACETAMINOPHEN 325 MG/1
975 TABLET ORAL ONCE
Status: DISCONTINUED | OUTPATIENT
Start: 2020-01-07 | End: 2020-01-07 | Stop reason: HOSPADM

## 2020-01-07 RX ORDER — NALOXONE HYDROCHLORIDE 0.4 MG/ML
.1-.4 INJECTION, SOLUTION INTRAMUSCULAR; INTRAVENOUS; SUBCUTANEOUS
Status: DISCONTINUED | OUTPATIENT
Start: 2020-01-07 | End: 2020-01-07

## 2020-01-07 RX ORDER — DIPHENHYDRAMINE HYDROCHLORIDE 50 MG/ML
25 INJECTION INTRAMUSCULAR; INTRAVENOUS EVERY 6 HOURS PRN
Status: DISCONTINUED | OUTPATIENT
Start: 2020-01-07 | End: 2020-01-09 | Stop reason: HOSPADM

## 2020-01-07 RX ORDER — HYDROMORPHONE HYDROCHLORIDE 1 MG/ML
.3-.5 INJECTION, SOLUTION INTRAMUSCULAR; INTRAVENOUS; SUBCUTANEOUS EVERY 5 MIN PRN
Status: DISCONTINUED | OUTPATIENT
Start: 2020-01-07 | End: 2020-01-07 | Stop reason: HOSPADM

## 2020-01-07 RX ORDER — CEFAZOLIN SODIUM 2 G/100ML
2 INJECTION, SOLUTION INTRAVENOUS EVERY 8 HOURS
Status: COMPLETED | OUTPATIENT
Start: 2020-01-07 | End: 2020-01-08

## 2020-01-07 RX ORDER — NALOXONE HYDROCHLORIDE 0.4 MG/ML
.1-.4 INJECTION, SOLUTION INTRAMUSCULAR; INTRAVENOUS; SUBCUTANEOUS
Status: DISCONTINUED | OUTPATIENT
Start: 2020-01-07 | End: 2020-01-07 | Stop reason: HOSPADM

## 2020-01-07 RX ORDER — LABETALOL HYDROCHLORIDE 5 MG/ML
10 INJECTION, SOLUTION INTRAVENOUS
Status: DISCONTINUED | OUTPATIENT
Start: 2020-01-07 | End: 2020-01-07 | Stop reason: HOSPADM

## 2020-01-07 RX ORDER — LIDOCAINE HYDROCHLORIDE 20 MG/ML
INJECTION, SOLUTION INFILTRATION; PERINEURAL PRN
Status: DISCONTINUED | OUTPATIENT
Start: 2020-01-07 | End: 2020-01-07

## 2020-01-07 RX ORDER — ONDANSETRON 2 MG/ML
4 INJECTION INTRAMUSCULAR; INTRAVENOUS EVERY 30 MIN PRN
Status: DISCONTINUED | OUTPATIENT
Start: 2020-01-07 | End: 2020-01-07 | Stop reason: HOSPADM

## 2020-01-07 RX ORDER — ACETAMINOPHEN 325 MG/1
975 TABLET ORAL ONCE
Status: COMPLETED | OUTPATIENT
Start: 2020-01-07 | End: 2020-01-07

## 2020-01-07 RX ORDER — ACETAMINOPHEN 325 MG/1
975 TABLET ORAL EVERY 8 HOURS
Status: DISCONTINUED | OUTPATIENT
Start: 2020-01-07 | End: 2020-01-09 | Stop reason: HOSPADM

## 2020-01-07 RX ORDER — HYDROMORPHONE HYDROCHLORIDE 1 MG/ML
.3-.5 INJECTION, SOLUTION INTRAMUSCULAR; INTRAVENOUS; SUBCUTANEOUS
Status: DISCONTINUED | OUTPATIENT
Start: 2020-01-07 | End: 2020-01-09 | Stop reason: HOSPADM

## 2020-01-07 RX ORDER — GABAPENTIN 100 MG/1
300 CAPSULE ORAL ONCE
Status: DISCONTINUED | OUTPATIENT
Start: 2020-01-07 | End: 2020-01-07 | Stop reason: HOSPADM

## 2020-01-07 RX ORDER — FLUMAZENIL 0.1 MG/ML
0.2 INJECTION, SOLUTION INTRAVENOUS
Status: DISCONTINUED | OUTPATIENT
Start: 2020-01-07 | End: 2020-01-07 | Stop reason: HOSPADM

## 2020-01-07 RX ORDER — CEFAZOLIN SODIUM IN 0.9 % NACL 3 G/100 ML
3 INTRAVENOUS SOLUTION, PIGGYBACK (ML) INTRAVENOUS
Status: COMPLETED | OUTPATIENT
Start: 2020-01-07 | End: 2020-01-07

## 2020-01-07 RX ORDER — SODIUM CHLORIDE, SODIUM LACTATE, POTASSIUM CHLORIDE, CALCIUM CHLORIDE 600; 310; 30; 20 MG/100ML; MG/100ML; MG/100ML; MG/100ML
INJECTION, SOLUTION INTRAVENOUS CONTINUOUS PRN
Status: DISCONTINUED | OUTPATIENT
Start: 2020-01-07 | End: 2020-01-07

## 2020-01-07 RX ORDER — METHOCARBAMOL 750 MG/1
750 TABLET, FILM COATED ORAL 4 TIMES DAILY PRN
Status: DISCONTINUED | OUTPATIENT
Start: 2020-01-07 | End: 2020-01-09 | Stop reason: HOSPADM

## 2020-01-07 RX ORDER — ONDANSETRON 2 MG/ML
INJECTION INTRAMUSCULAR; INTRAVENOUS PRN
Status: DISCONTINUED | OUTPATIENT
Start: 2020-01-07 | End: 2020-01-07

## 2020-01-07 RX ADMIN — PROPOFOL 20 MG: 10 INJECTION, EMULSION INTRAVENOUS at 14:15

## 2020-01-07 RX ADMIN — LIDOCAINE HYDROCHLORIDE 100 MG: 20 INJECTION, SOLUTION INFILTRATION; PERINEURAL at 11:44

## 2020-01-07 RX ADMIN — ONDANSETRON 4 MG: 2 INJECTION INTRAMUSCULAR; INTRAVENOUS at 14:05

## 2020-01-07 RX ADMIN — FENTANYL CITRATE 50 MCG: 50 INJECTION, SOLUTION INTRAMUSCULAR; INTRAVENOUS at 12:18

## 2020-01-07 RX ADMIN — MIDAZOLAM 1 MG: 1 INJECTION INTRAMUSCULAR; INTRAVENOUS at 10:54

## 2020-01-07 RX ADMIN — BUPIVACAINE HYDROCHLORIDE AND EPINEPHRINE BITARTRATE 10 ML: 5; .005 INJECTION, SOLUTION EPIDURAL; INTRACAUDAL; PERINEURAL at 11:00

## 2020-01-07 RX ADMIN — ACETAMINOPHEN 975 MG: 325 TABLET, FILM COATED ORAL at 20:22

## 2020-01-07 RX ADMIN — PHENYLEPHRINE HYDROCHLORIDE 100 MCG: 10 INJECTION INTRAVENOUS at 12:37

## 2020-01-07 RX ADMIN — BUPIVACAINE 10 ML: 13.3 INJECTION, SUSPENSION, LIPOSOMAL INFILTRATION at 11:00

## 2020-01-07 RX ADMIN — GABAPENTIN 300 MG: 300 CAPSULE ORAL at 09:36

## 2020-01-07 RX ADMIN — CEFAZOLIN 1 G: 1 INJECTION, POWDER, FOR SOLUTION INTRAMUSCULAR; INTRAVENOUS at 13:56

## 2020-01-07 RX ADMIN — PROPOFOL 30 MG: 10 INJECTION, EMULSION INTRAVENOUS at 12:18

## 2020-01-07 RX ADMIN — PHENYLEPHRINE HYDROCHLORIDE 100 MCG: 10 INJECTION INTRAVENOUS at 12:29

## 2020-01-07 RX ADMIN — Medication 3 G: at 11:58

## 2020-01-07 RX ADMIN — SODIUM CHLORIDE, POTASSIUM CHLORIDE, SODIUM LACTATE AND CALCIUM CHLORIDE: 600; 310; 30; 20 INJECTION, SOLUTION INTRAVENOUS at 11:40

## 2020-01-07 RX ADMIN — ROCURONIUM BROMIDE 50 MG: 10 INJECTION INTRAVENOUS at 11:44

## 2020-01-07 RX ADMIN — PHENYLEPHRINE HYDROCHLORIDE 0.5 MCG/KG/MIN: 10 INJECTION INTRAVENOUS at 12:39

## 2020-01-07 RX ADMIN — DEXAMETHASONE SODIUM PHOSPHATE 10 MG: 4 INJECTION, SOLUTION INTRAMUSCULAR; INTRAVENOUS at 12:03

## 2020-01-07 RX ADMIN — FENTANYL CITRATE 50 MCG: 50 INJECTION, SOLUTION INTRAMUSCULAR; INTRAVENOUS at 10:54

## 2020-01-07 RX ADMIN — PHENYLEPHRINE HYDROCHLORIDE 100 MCG: 10 INJECTION INTRAVENOUS at 13:10

## 2020-01-07 RX ADMIN — ASPIRIN 162 MG: 81 TABLET, COATED ORAL at 20:23

## 2020-01-07 RX ADMIN — PROPOFOL 200 MG: 10 INJECTION, EMULSION INTRAVENOUS at 11:44

## 2020-01-07 RX ADMIN — SENNOSIDES AND DOCUSATE SODIUM 1 TABLET: 8.6; 5 TABLET ORAL at 20:23

## 2020-01-07 RX ADMIN — CEFAZOLIN SODIUM 2 G: 2 INJECTION, SOLUTION INTRAVENOUS at 22:11

## 2020-01-07 RX ADMIN — FENTANYL CITRATE 50 MCG: 50 INJECTION, SOLUTION INTRAMUSCULAR; INTRAVENOUS at 11:44

## 2020-01-07 RX ADMIN — ACETAMINOPHEN 975 MG: 325 TABLET, FILM COATED ORAL at 09:36

## 2020-01-07 ASSESSMENT — MIFFLIN-ST. JEOR: SCORE: 2351.38

## 2020-01-07 NOTE — LETTER
Health Information Management Services               Recipient:    [facility]      Sender:    Julianna Burks RN, BSN  Care Coordinator, 8A  Phone [phone]  Pager [phone]        Date: January 9, 2020  Patient Name:  Foreign Pond  Routing Message:      Home Care Referral    The documents accompanying this notice contain confidential information belonging to the sender.  This information is intended only for the use of the individual or entity named above.  The authorized recipient of this information is prohibited from disclosing this information to any other party and is required to destroy the information after its stated need has been fulfilled, unless otherwise required by state law.      If you are not the intended recipient, you are hereby notified that any disclosure, copy, distribution or action taken in reliance on the contents of these documents is strictly prohibited.  If you have received this document in error, please return it by fax to [phone] with a note on the cover sheet explaining why you are returning it (e.g. not your patient, not your provider, etc.).  If you need further assistance, please call [facility] Centralized Transcription at [phone].  Documents may also be returned by mail to [employer], , [facility] [address], [place], [place] [address].

## 2020-01-07 NOTE — OP NOTE
DATE OF PROCEDURE: 01/07/20    PREOPERATIVE DIAGNOSIS:    1. Left end-stage glenohumeral arthrosis.   2. Morbid obesity     POSTOPERATIVE DIAGNOSIS:   1. Left end-stage glenohumeral arthrosis.   2. Morbid obesity     PROCEDURE:  Left total shoulder arthroplasty.   NOTE ON COMPLEXITY: Modifier 22 is requested given the increased complexity and increased time necessary to safely position the patient and expose the joint given body habitus (BMI 47).     STAFF SURGEON:  Zahida Ames MD      ASSISTANT: Kev Vazquez MD; Jose Madera MD    ANESTHESIA:  General endotracheal anesthesia with supplementary interscalene block.      ESTIMATED BLOOD LOSS:  150ml.      IMPLANTS:    1. Biomet Comprehensive stem 17x55  2. Biomet Comprehensive large glenoid with Regenerex post  3. Biomet Comprehensive humeral head 46x18 with offset E at 7 pm (3 o'clock posterior)     BRIEF PATIENT HISTORY: Mr. Pond is a 59-year-old male with end stage left shoulder arthritis. He has radiographic evidence of joint space loss and a large ring humeral osteophyte. He has had non-operative treatment but this has not provided lasting relief of pain or improvement in motion.  For this reason, he wished to consider surgical intervention.  We discussed the risks and benefits of total shoulder arthroplasty and the patient wished to proceed with this surgery.  Informed consent was completed.      DESCRIPTION OF PROCEDURE:  The patient was identified in the preoperative area and correct left shoulder was marked for surgery.  He was provided an interscalene block by our anesthesia colleagues and was taken to the operating room where he was surrendered to general endotracheal anesthesia. He was moved to the operating room table in the beachchair position with all bony prominences well padded, and the head was placed in a head noe with the neck in neutral position. I added the bed extenders on the sides and used additional pillows and pads so as to  safely position the patient.  The left upper extremity was prepped and draped in the usual sterile fashion.  A timeout was held in accordance with hospital policy, confirming correct patient, side, site, procedure, placement of lower extremity, sequential compressive devices and administration of IV antibiotics prior to incision.      I completed a deltopectoral incision and approach.  I identified the cephalic vein and brought this medially.  I entered the deltopectoral interval and freed the subdeltoid adhesions.  I palpated the bursal surface of the rotator cuff and found it to be intact.  I palpated the axillary nerve medially and laterally performing a tug test confirming location and continuity of the nerve.  This was performed multiple times throughout the procedure including once prior to closure.  I opened the bicipital groove and rotator cuff interval and tenotomized the biceps, tenodesing it to the upper border of the pec.  I released the subscapularis via a lesser tuberosity osteotomy.  I released the capsule along the inferior neck of the glenoid while protecting the axillary nerve.  I then was able to dislocate the humerus anteriorly.  The shoulder was extremely tight and the patient's body habitus necessitated the extended Brown retractor to expose the proximal humerus.  I was then eventually able to enter the humeral canal and reamed up progressively to a 17.  I cut the humeral head in 30 degrees of retroversion and then impacted the trial stem in this version.  I removed the large circumferential excess humeral osteophyte. I then turned my attention to the glenoid.  I released the anterior and inferior capsule which was very thick and tight. I protected the axillary nerve during these releases. After doing so, and with additional glenoid retractors, there was excellent glenoid exposure.   I then performed an anterior inferior capsulectomy and released the labrum circumferentially.  The glenoid was  worn fairly centrally with perhaps mild increased posterior wear.  I placed my index finger along the anterior glenoid neck to guide the initial pin placement in neutral version.  I then reamed over this for a size large glenoid.  The peripheral peg holes were then drilled.  All peripheral peg holes were then sequentially prepared with thrombin and then hydrogen peroxide-soaked sponges.  The peripheral holes were then sequentially pressurized with cement.  The final implant was impacted and seated securely and excess cement was removed.  The implant was held in place while the cement cured.  I then trialed humeral head components and a 46x18 was found to be most appropriate. Without the circumferential osteophyte removed the head size was closer to a 50 but with osteophyte removal a 46 was more appropriate and the 46x18 allowed better translation and motion.  This allowed forward elevation to 165, external rotation at the side to 60 and internal rotation in abduction to 70 degrees with 50% bounce back.  I removed the trial humeral components and passed 3 sets of #2 Force Fiber around the lesser tuberosity.  The final stem was impacted through these and the final humeral head placed with maximal offset at 7:00.  The joint was then reduced with the same range of motion as previously noted.  The subscapularis was repaired back with the #2 force fiber sutures in racking hitch stitch fashion.  The biceps was then tenodesed.  The wound was copiously irrigated and a deep Hemovac drain was placed.  The wound was closed in layered fashion with monofilament sutures.  Steri-Strips and a soft sterile dressing were applied.  The arm was placed into an abduction sling.  The patient was extubated and transported to recovery room in stable condition.  There were no apparent intraoperative complications.      POSTOPERATIVE PLAN:   1.  The patient will be allowed passive and active assisted range of motion with forward elevation to  140 and external rotation at the side to 40.  He will be discharged to home when he meets discharge criteria.   2.  The patient will be allowed to transition to active range of motion at 6 weeks and discontinue the sling at that time.  He will be seen by me for wound check at 2 weeks.         CAROLYNN GARCIA MD

## 2020-01-07 NOTE — CONSULTS
HOSPITALIST INITIAL CONSULT NOTE    Referring Provider:  Zahida Ames MD      Reason for Consult         History of Present Illness     Foreign Pond is 59 year old year old male with hx of HTN, BPH, HLD,  Mild intermittent Asthma is admitted s/p Left total shoulder arthroplasty on 01/07/2020     ml    Currently doing well. Stastes he is all done,adn God will save him. Reports pain is controlled. Denies any chest pain, shortness of breath, lightheadedness or dizziness.               Past Medical History     Past Medical History:   Diagnosis Date     Asthma      BPH (benign prostatic hyperplasia)      Hypercholesteremia      Hypertension      Mild intermittent asthma 8/15/2006    Patient states he does not have asthma.  Doesn't use albuterol ever.             Past Surgical History     Past Surgical History:   Procedure Laterality Date     COLONOSCOPY  7/2006    repeat in 10 years     COLONOSCOPY N/A 12/12/2016    Procedure: COLONOSCOPY;  Surgeon: Delonte Miller MD;  Location: [facility]     CREATE EARDRUM OPENING,LOCAL ANESTH  age 15     HC ECP WITH CATARACT SURGERY       LAPAROSCOPIC CHOLECYSTECTOMY  11/8/2012    Procedure: LAPAROSCOPIC CHOLECYSTECTOMY;  Laparoscopic Cholecystectomy & repair of umbilical hernia;  Surgeon: Delonte Miller MD;  Location: [facility]     LASIK BILATERAL  2011     VASECTOMY Bilateral 2/21/2018    Procedure: VASECTOMY;  Bilateral vasectomy;  Surgeon: Kavon Everett MD;  Location:  OR          Medications     All his current medications are reviewed in current medication section of [facility].  Home medications are reviewed.  Current Facility-Administered Medications   Medication     acetaminophen (TYLENOL) tablet 975 mg     acetaminophen (TYLENOL) tablet 975 mg     bacitracin 50,000 Units, gentamicin (GARAMYCIN) 120 mg, polymyxin B 500,000 Units in sodium chloride 0.9% (bag) 1,000 mL Bag for irrigation     bupivacaine liposome (EXPAREL) LONG ACTING injection was administered into  "the infiltration site to produce postsurgical analgesia. Duration of action is up to 72 hours, and other \"irving\" medications should not be given for 96 hours with the exception of the lidocaine 5% patch (LIDODERM) and the lidocaine 10mg in potassium infusions. This entry is for INFORMATION ONLY.     ceFAZolin (ANCEF) 1 g vial to attach to  ml bag for ADULT or 50 ml bag for PEDS     fentaNYL (PF) (SUBLIMAZE) injection 25-50 mcg     fentaNYL (PF) (SUBLIMAZE) injection 25-50 mcg     fentaNYL (PF) (SUBLIMAZE) injection 25-50 mcg     flumazenil (ROMAZICON) injection 0.2 mg     flumazenil (ROMAZICON) injection 0.2 mg     gabapentin (NEURONTIN) capsule 300 mg     hydrogen peroxide 3 % solution     HYDROmorphone (PF) (DILAUDID) injection 0.3-0.5 mg     HYDROmorphone (PF) (DILAUDID) injection 0.3-0.5 mg     labetalol (NORMODYNE/TRANDATE) injection 10 mg     lactated ringers infusion     lactated ringers infusion     lidocaine 1 % 0.1-1 mL     methocarbamol (ROBAXIN) tablet 750 mg     midazolam (VERSED) injection 1-2 mg     midazolam (VERSED) injection 1-2 mg     naloxone (NARCAN) injection 0.1-0.4 mg     naloxone (NARCAN) injection 0.1-0.4 mg     ondansetron (ZOFRAN-ODT) ODT tab 4 mg    Or     ondansetron (ZOFRAN) injection 4 mg     oxyCODONE (ROXICODONE) tablet 5-10 mg     prochlorperazine (COMPAZINE) injection 10 mg     sodium chloride (PF) 0.9% PF flush 3 mL     sodium chloride (PF) 0.9% PF flush 3 mL     sodium chloride 0.9% (bottle) 500 mL with povidone-iodine (BETADINE) 5 % 30 mL sterile ophthalmic irrigation     sodium chloride 0.9% (bottle) irrigation     thrombin 5000 units vial     vancomycin (VANCOCIN) topical powder        Allergies        Allergies   Allergen Reactions     Lisinopril Cough        Family History     Family History   Problem Relation Age of Onset     Hypertension Father      C.A.D. Father      Thyroid Disease Father      Respiratory Mother         asthma     Cancer Mother      Alcohol/Drug " "Maternal Grandfather      Cerebrovascular Disease Paternal Grandfather      Alcohol/Drug Paternal Grandfather      Hypertension Brother      Heart Disease Brother           Social History     Social History     Socioeconomic History     Marital status:      Spouse name: Not on file     Number of children: Not on file     Years of education: Not on file     Highest education level: Not on file   Occupational History     Not on file   Social Needs     Financial resource strain: Not on file     Food insecurity:     Worry: Not on file     Inability: Not on file     Transportation needs:     Medical: Not on file     Non-medical: Not on file   Tobacco Use     Smoking status: Never Smoker     Smokeless tobacco: Never Used   Substance and Sexual Activity     Alcohol use: No     Drug use: No     Sexual activity: Yes     Partners: Female   Lifestyle     Physical activity:     Days per week: Not on file     Minutes per session: Not on file     Stress: Not on file   Relationships     Social connections:     Talks on phone: Not on file     Gets together: Not on file     Attends Taoist service: Not on file     Active member of club or organization: Not on file     Attends meetings of clubs or organizations: Not on file     Relationship status: Not on file     Intimate partner violence:     Fear of current or ex partner: Not on file     Emotionally abused: Not on file     Physically abused: Not on file     Forced sexual activity: Not on file   Other Topics Concern     Parent/sibling w/ CABG, MI or angioplasty before 65F 55M? Yes     Comment: father MI - unknown age    Social History Narrative     Not on file        Review of Systems   A 10 point review of systems was taken and largely negative except for that which was stated above.      Physical Exam       Vital signs:    Blood pressure (!) 141/88, pulse 86, temperature 97.9  F (36.6  C), resp. rate 18, height 1.791 m (5' 10.51\"), weight (!) 151.2 kg (333 lb 5.4 oz), " "SpO2 91 %.  Estimated body mass index is 47.14 kg/m  as calculated from the following:    Height as of this encounter: 1.791 m (5' 10.51\").    Weight as of this encounter: 151.2 kg (333 lb 5.4 oz).      Intake/Output Summary (Last 24 hours) at 1/7/2020 1455  Last data filed at 1/7/2020 1402  Gross per 24 hour   Intake 1000 ml   Output 150 ml   Net 850 ml      HEENT: No icterus, no pallor  Cardiovascular: S1, S2 normal  Respiratory: B/L CTA  Abdomen: Soft, NT, BS+  Neurology: Alert, awake,and oriented to time place person. Seemed little confused. No focal neuro deficit.   Extremities: Left shoulder dressing in place.        Laboratory and Imaging Studies     Laboratory and Imaging studies reviewed in the results review section of Epic. Pertinent studies are as below:    CMP  Recent Labs   Lab 01/07/20  0910   POTASSIUM 4.2   *     CBCNo lab results found in last 7 days.  INRNo lab results found in last 7 days.  Arterial Blood GasNo lab results found in last 7 days.       Impression/Recommendations       59 year old year old male with hx of HTN, BPH, HLD,  Mild intermittent Asthma is admitted s/p Left total shoulder arthroplasty on 01/07/2020    # s/p Left total shoulder arthroplasty on 01/07/2020  On Analgesics, and dressing in place  - Wound cares, Dressings, Surgical pain management, DVT Prophylaxis  per primary team.   - Monitor anemia, hemodynamics, Input/Output  - Monitor Capnogrphy  - Encourage Incentive spirometry  - Laxatives for constipation prophylaxis     # Anemia: Most likely due to Hemodilution, blood loss, and post surgical inflammation.   - Transfuse if Hg < 7 gm/dl     # Hx of HTN: PTA on Losartan, hydrochlorothiazide   - Hold tonight    # Hx of Urinary incontinence: PTA on Tamsulosin  - Continue    # Asthma: Not on any home inhalers  - Monitor respiratory status    # Hx of snoring: No official diagnosis of JAZMYNE. High risk for it  - Minimize narcotics  - Pulse oximetery/Capnography          "     Michael Mera  Internal Medicine/Hospitalist  [place] [phone]

## 2020-01-07 NOTE — ANESTHESIA CARE TRANSFER NOTE
Patient: Foreign Pond    Procedure(s):  Left total shoulder arthroplasty    Diagnosis: Arthritis of shoulder region, left [M19.012]  Diagnosis Additional Information: No value filed.    Anesthesia Type:   General     Note:  Airway :Face Mask  Patient transferred to:PACU  Comments: Patient is Sleepy, VSS, following commands. Patient is breathing Spontaneously with face mask with nasal trumpet. No obvious complications from anesthesia. Care report given to PACU RN, and notified of assigned Anesthesiology staff to patient for continuity of PACU care.     Adrian العلي DO.  Anesthesiology Resident CA-1, PGY-2  Pager [phone]  Handoff Report: Identifed the Patient, Identified the Reponsible Provider, Reviewed the pertinent medical history, Discussed the surgical course, Reviewed Intra-OP anesthesia mangement and issues during anesthesia, Set expectations for post-procedure period and Allowed opportunity for questions and acknowledgement of understanding      Vitals: (Last set prior to Anesthesia Care Transfer)    CRNA VITALS  1/7/2020 1412 - 1/7/2020 1452      1/7/2020             NIBP:  145/77    Pulse:  74                Electronically Signed By: Adrian العلي DO  January 7, 2020  2:52 PM

## 2020-01-07 NOTE — OR NURSING
PACU to Inpatient Nursing Handoff    Patient Foreign Pond is a 59 year old male who speaks English.   Procedure Procedure(s):  Left total shoulder arthroplasty   Surgeon(s) Primary: Zahida Ames MD  Resident - Assisting: Jose Madera MD  Fellow - Assisting: Maurice Vazquez MD     Allergies   Allergen Reactions     Lisinopril Cough       Isolation  [unfilled]     Past Medical History   has a past medical history of Asthma, BPH (benign prostatic hyperplasia), Hypercholesteremia, Hypertension, and Mild intermittent asthma (8/15/2006).    Anesthesia Combined General with Interscalene Block   Dermatome Level     Preop Meds acetaminophen (Tylenol) - time given: 0936  gabapentin (Neurontin) - time given: 0936   Nerve block Interscalene.  Location:left. Med:bupivacaine. Time given: 1000   Intraop Meds dexamethasone (Decadron)  fentanyl (Sublimaze): 100 mcg total  ondansetron (Zofran): last given at 1405   Local Meds No   Antibiotics cefazolin (Ancef) - last given at 1356     Pain Patient Currently in Pain: denies  Comfort: tolerable with discomfort  Pain Control: partially effective   PACU meds  Not applicable   PCA / epidural No   Capnography     Telemetry ECG Rhythm: Normal sinus rhythm   Inpatient Telemetry Monitor Ordered? No        Labs Glucose Lab Results   Component Value Date     01/07/2020       Hgb Lab Results   Component Value Date    HGB 14.3 12/20/2019       INR No results found for: INR   PACU Imaging Not applicable     Wound/Incision Incision/Surgical Site 11/08/12 Abdomen (Active)   Number of days: 2616       Incision/Surgical Site 02/21/18 Perineum (Active)   Number of days: 685       Incision/Surgical Site 01/07/20 Left Shoulder (Active)   Incision Assessment UTV 1/7/2020  2:50 PM   Closure Adhesive strip(s) 1/7/2020  2:16 PM   Incision Drainage Amount None 1/7/2020  2:50 PM   Dressing Intervention New dressing applied 1/7/2020  2:16 PM   Number of days: 0      CMS         Equipment shoulder sling   Other LDA       IV Access Peripheral IV 12/12/16 Right Hand (Active)   Number of days: 1121       Peripheral IV 10/17/17 Left Upper forearm (Active)   Number of days: 812       Peripheral IV 01/07/20 Right Hand (Active)   Site Assessment WDL 1/7/2020  2:50 PM   Line Status Infusing 1/7/2020  2:50 PM   Phlebitis Scale 0-->no symptoms 1/7/2020  2:50 PM   Infiltration Scale 0 1/7/2020  2:50 PM   Infiltration Site Treatment Method  None 1/7/2020  2:50 PM   Extravasation? No 1/7/2020  2:50 PM   Dressing Intervention New dressing  1/7/2020  9:28 AM   Number of days: 0      Blood Products Not applicable    mL   Intake/Output Date 01/07/20 0700 - 01/08/20 0659   Shift 4780-5405 8342-5342 2106-2015 24 Hour Total   INTAKE   I.V. 1000   1000   Shift Total(mL/kg) 1000(6.61)   1000(6.61)   OUTPUT   Blood 150   150   Shift Total(mL/kg) 150(0.99)   150(0.99)   Weight (kg) 151.2 151.2 151.2 151.2      Drains / Rodriguez Closed/Suction Drain Left Shoulder Accordion 10 [place] (Active)   Drainage Appearance Bloody/Bright Red 1/7/2020  2:50 PM   Number of days: 0      Time of void PreOp Void Prior to Procedure: 1000 (01/07/20 1100)    PostOp      Diapered? No   Bladder Scan     PO    tolerating sips     Vitals    B/P: 136/66  T: 97.9  F (36.6  C)    Temp src: Oral  P:  Pulse: 90 (01/07/20 1515)    Heart Rate: 90 (01/07/20 1515)     R: 13  O2:  SpO2: 90 %    O2 Device: None (Room air) (01/07/20 1515)    Oxygen Delivery: 8 LPM (01/07/20 1500)         Family/support present significant other   Patient belongings     Patient transported on bed   DC meds/scripts (obs/outpt) Not applicable   Inpatient Pain Meds Released? Yes       Special needs/considerations None   Tasks needing completion None       Dione Saldana RN  ASCOM [id number]

## 2020-01-07 NOTE — LETTER
Health Information Management Services               Recipient:    [facility]      Sender:  Julianna Burks RN, BSN  Care Coordinator, 8A  Phone [phone]  Pager [phone]        Date: January 9, 2020  Patient Name:  Foreign Pond  Routing Message:      Demographics      The documents accompanying this notice contain confidential information belonging to the sender.  This information is intended only for the use of the individual or entity named above.  The authorized recipient of this information is prohibited from disclosing this information to any other party and is required to destroy the information after its stated need has been fulfilled, unless otherwise required by state law.      If you are not the intended recipient, you are hereby notified that any disclosure, copy, distribution or action taken in reliance on the contents of these documents is strictly prohibited.  If you have received this document in error, please return it by fax to [phone] with a note on the cover sheet explaining why you are returning it (e.g. not your patient, not your provider, etc.).  If you need further assistance, please call [employer] Centralized Transcription at [phone].  Documents may also be returned by mail to [place], , [facility] [address], [place], [place] [address].

## 2020-01-07 NOTE — ANESTHESIA PROCEDURE NOTES
Peripheral Nerve Block Procedure Note  Date/Time: 1/7/2020 11:00 AM    Staff:     Anesthesiologist:  Slick Hoang MD  Location: Pre-op  Procedure Start/Stop TImes:      1/7/2020 10:55 AM     1/7/2020 11:05 AM    patient identified, IV checked, site marked, risks and benefits discussed, informed consent, monitors and equipment checked, pre-op evaluation, at physician/surgeon's request and post-op pain management      Correct Patient: Yes      Correct Position: Yes      Correct Site: Yes      Correct Procedure: Yes      Correct Laterality:  Yes    Site Marked:  Yes  Procedure details:     Procedure:  Interscalene    ASA:  3    Laterality:  Left    Position:  Sitting    Sterile Prep: chloraprep, mask and sterile gloves      Needle:  Short bevel and insulated    Needle gauge:  21    Needle length (inches):  4    Ultrasound: Yes      Ultrasound used to identify targeted nerve, plexus, or vascular structure and placed a needle adjacent to it      Permanent Image entered into patiient's record      Abnormal pain on injection: No      Blood Aspirated: No      Paresthesias:  No    Bleeding at site: No      Test dose negative for signs of intravascular injection: Yes      Bolus via:  Needle    Infusion Method:  Single Shot    Complications:  None  Assessment/Narrative:     Injection made incrementally with aspirations every (mL):  5

## 2020-01-07 NOTE — BRIEF OP NOTE
"   [facility], [place]    Orthopaedic Surgery  Brief Operative Note    Pre-operative diagnosis: Left glenohumeral arthrosis   Post-operative diagnosis Same   Procedure: Procedure(s):  Left total shoulder arthroplasty   Surgeon: Zahida Ames MD   Assistants(s): Moiz Madera   Anesthesia: Combined General with Interscalene Block    Estimated blood loss: 150ml   Total IV fluids: (See anesthesia record)   Blood transfusion: (See anesthesia record)   Total urine output: (See anesthesia record)   Drains: Hemovac   Specimens: * No specimens in log *   Findings: Complete loss of cartilage on humeral head and glenoid. Large humeral osteophyte   Complications: None   Implants: Biomet stem 17x55; large glenoid; humeral head 46x18 (offset \"E\" posterior-inferior)       "

## 2020-01-07 NOTE — ANESTHESIA POSTPROCEDURE EVALUATION
Anesthesia POST Procedure Evaluation    Patient: Foreign Pond   MRN:     [id number] Gender:   male   Age:    59 year old :      1960        Preoperative Diagnosis: Arthritis of shoulder region, left [M19.012]   Procedure(s):  Left total shoulder arthroplasty   Postop Comments: No value filed.       Anesthesia Type:  Not documented  General    Reportable Event: NO     PAIN: Uncomplicated   Sign Out status: Comfortable, Well controlled pain     PONV: No PONV   Sign Out status:  No Nausea or Vomiting     Neuro/Psych: Uneventful perioperative course   Sign Out Status: Preoperative baseline; Age appropriate mentation     Airway/Resp.: Uneventful perioperative course   Sign Out Status: Non labored breathing, age appropriate RR; Resp. Status within EXPECTED Parameters     CV: Uneventful perioperative course   Sign Out status: Appropriate BP and perfusion indices; Appropriate HR/Rhythm     Disposition:   Sign Out in:  PACU  Disposition:  Phase II; Home  Recovery Course: Uneventful  Follow-Up: Not required           Last Anesthesia Record Vitals:  CRNA VITALS  2020 1412 - 2020 1512      2020             NIBP:  145/77    Pulse:  74          Last PACU Vitals:  Vitals Value Taken Time   /66 2020  3:15 PM   Temp     Pulse 90 2020  3:15 PM   Resp 18 2020  3:20 PM   SpO2 94 % 2020  3:20 PM   Temp src     NIBP 145/77 2020  2:47 PM   Pulse 74 2020  2:47 PM   SpO2     Resp     Temp     Ht Rate     Temp 2     Vitals shown include unvalidated device data.      Electronically Signed By: Lluvia Driscoll MD, 2020, 3:21 PM

## 2020-01-07 NOTE — OR NURSING
Called into OR to update Dr Ames regarding the rash under her armpit.  Per Dr Ames, ok to proceed if he's had the rash for a long time.  Per Dr Hoang, he wants to wait to proceed with nerve block.

## 2020-01-08 ENCOUNTER — APPOINTMENT (OUTPATIENT)
Dept: OCCUPATIONAL THERAPY | Facility: CLINIC | Age: 60
End: 2020-01-08
Attending: ORTHOPAEDIC SURGERY
Payer: COMMERCIAL

## 2020-01-08 ENCOUNTER — APPOINTMENT (OUTPATIENT)
Dept: GENERAL RADIOLOGY | Facility: CLINIC | Age: 60
End: 2020-01-08
Attending: ORTHOPAEDIC SURGERY
Payer: COMMERCIAL

## 2020-01-08 LAB — HGB BLD-MCNC: 12.4 G/DL (ref 13.3–17.7)

## 2020-01-08 PROCEDURE — 97530 THERAPEUTIC ACTIVITIES: CPT | Mod: GO

## 2020-01-08 PROCEDURE — 97535 SELF CARE MNGMENT TRAINING: CPT | Mod: GO

## 2020-01-08 PROCEDURE — 99232 SBSQ HOSP IP/OBS MODERATE 35: CPT | Performed by: INTERNAL MEDICINE

## 2020-01-08 PROCEDURE — 25000132 ZZH RX MED GY IP 250 OP 250 PS 637: Performed by: STUDENT IN AN ORGANIZED HEALTH CARE EDUCATION/TRAINING PROGRAM

## 2020-01-08 PROCEDURE — 25000132 ZZH RX MED GY IP 250 OP 250 PS 637: Performed by: HOSPITALIST

## 2020-01-08 PROCEDURE — 97165 OT EVAL LOW COMPLEX 30 MIN: CPT | Mod: GO

## 2020-01-08 PROCEDURE — 40000986 XR SHOULDER 2 VIEW LEFT: Mod: LT

## 2020-01-08 PROCEDURE — 25000125 ZZHC RX 250: Performed by: ORTHOPAEDIC SURGERY

## 2020-01-08 PROCEDURE — 85018 HEMOGLOBIN: CPT | Performed by: ORTHOPAEDIC SURGERY

## 2020-01-08 PROCEDURE — 36415 COLL VENOUS BLD VENIPUNCTURE: CPT | Performed by: ORTHOPAEDIC SURGERY

## 2020-01-08 PROCEDURE — 25000128 H RX IP 250 OP 636: Performed by: STUDENT IN AN ORGANIZED HEALTH CARE EDUCATION/TRAINING PROGRAM

## 2020-01-08 PROCEDURE — 25000132 ZZH RX MED GY IP 250 OP 250 PS 637: Performed by: PEDIATRICS

## 2020-01-08 PROCEDURE — 25000132 ZZH RX MED GY IP 250 OP 250 PS 637: Performed by: INTERNAL MEDICINE

## 2020-01-08 PROCEDURE — 97110 THERAPEUTIC EXERCISES: CPT | Mod: GO

## 2020-01-08 PROCEDURE — 12000001 ZZH R&B MED SURG/OB UMMC

## 2020-01-08 RX ORDER — BISACODYL 10 MG
10 SUPPOSITORY, RECTAL RECTAL DAILY PRN
Status: DISCONTINUED | OUTPATIENT
Start: 2020-01-08 | End: 2020-01-09 | Stop reason: HOSPADM

## 2020-01-08 RX ORDER — ASPIRIN 325 MG
325 TABLET, DELAYED RELEASE (ENTERIC COATED) ORAL DAILY
Qty: 28 TABLET | Refills: 0 | Status: SHIPPED | OUTPATIENT
Start: 2020-01-08 | End: 2020-01-09

## 2020-01-08 RX ORDER — POLYETHYLENE GLYCOL 3350 17 G/17G
17 POWDER, FOR SOLUTION ORAL 2 TIMES DAILY PRN
Status: DISCONTINUED | OUTPATIENT
Start: 2020-01-08 | End: 2020-01-09 | Stop reason: HOSPADM

## 2020-01-08 RX ORDER — CALCIUM CARBONATE 500 MG/1
500 TABLET, CHEWABLE ORAL DAILY PRN
Status: DISCONTINUED | OUTPATIENT
Start: 2020-01-08 | End: 2020-01-09 | Stop reason: HOSPADM

## 2020-01-08 RX ORDER — OXYCODONE HYDROCHLORIDE 5 MG/1
5-10 TABLET ORAL EVERY 6 HOURS PRN
Qty: 30 TABLET | Refills: 0 | Status: SHIPPED | OUTPATIENT
Start: 2020-01-08 | End: 2020-01-09

## 2020-01-08 RX ORDER — AMOXICILLIN 250 MG
2 CAPSULE ORAL 2 TIMES DAILY
Qty: 80 TABLET | Refills: 0 | Status: SHIPPED | OUTPATIENT
Start: 2020-01-08 | End: 2020-01-09

## 2020-01-08 RX ORDER — ASPIRIN 81 MG/1
81 TABLET ORAL DAILY
Status: ON HOLD | COMMUNITY
End: 2020-01-09

## 2020-01-08 RX ADMIN — CEFAZOLIN SODIUM 2 G: 2 INJECTION, SOLUTION INTRAVENOUS at 05:53

## 2020-01-08 RX ADMIN — ACETAMINOPHEN 975 MG: 325 TABLET, FILM COATED ORAL at 13:07

## 2020-01-08 RX ADMIN — SENNOSIDES AND DOCUSATE SODIUM 2 TABLET: 8.6; 5 TABLET ORAL at 08:34

## 2020-01-08 RX ADMIN — ASPIRIN 325 MG: 325 TABLET, DELAYED RELEASE ORAL at 08:33

## 2020-01-08 RX ADMIN — POLYETHYLENE GLYCOL 3350 17 G: 17 POWDER, FOR SOLUTION ORAL at 20:07

## 2020-01-08 RX ADMIN — CALCIUM CARBONATE (ANTACID) CHEW TAB 500 MG 500 MG: 500 CHEW TAB at 00:30

## 2020-01-08 RX ADMIN — FAMOTIDINE 20 MG: 10 INJECTION, SOLUTION INTRAVENOUS at 01:04

## 2020-01-08 RX ADMIN — ACETAMINOPHEN 975 MG: 325 TABLET, FILM COATED ORAL at 05:22

## 2020-01-08 RX ADMIN — ACETAMINOPHEN 975 MG: 325 TABLET, FILM COATED ORAL at 21:50

## 2020-01-08 RX ADMIN — TAMSULOSIN HYDROCHLORIDE 0.4 MG: 0.4 CAPSULE ORAL at 08:34

## 2020-01-08 RX ADMIN — SENNOSIDES AND DOCUSATE SODIUM 2 TABLET: 8.6; 5 TABLET ORAL at 20:07

## 2020-01-08 NOTE — PLAN OF CARE
Discharge Planner PT   Patient plan for discharge: Unknown  Current status: PT orders received for PT evaluation and treatment: s/p TSA.  Per OT pt does not have any skilled inpatient PT needs. Pt is close to baseline for all mobility.   Barriers to return to prior living situation: See OT note for barriers to discharge  Recommendations for discharge: See OT note for discharge recommendations  Rationale for recommendations: Per OT pt does not have any skilled PT needs, PT order completed.         Entered by: Gisel Abreu 01/08/2020 3:38 PM

## 2020-01-08 NOTE — DISCHARGE SUMMARY
[facility]  Orthopedic Surgery Discharge Summary     Date of Admission: 1/7/2020  Date of Discharge: 1/9/2020 12:46 PM  Disposition: Home  Staff Physician: Zahida Ames, *  Primary Care Provider: Uli Owens    ADMISSION DIAGNOSIS:  Arthritis of shoulder region, left [M19.012]    DISCHARGE DIAGNOSIS:  Arthritis of shoulder region, left [M19.012]    PROCEDURES: Procedure(s):  Left total shoulder arthroplasty on 1/7/2020    BRIEF HISTORY:  Mr. Pond is a 59-year-old male with end stage left shoulder arthritis. He has radiographic evidence of joint space loss and a large ring humeral osteophyte. He has had non-operative treatment but this has not provided lasting relief of pain or improvement in motion.  For this reason, he wished to consider surgical intervention.    HOSPITAL COURSE:    The patient was admitted following the above listed procedures for pain control and rehabilitation. Foreign Pond did well post-operatively. Medicine was consulted post operatively to aid in management of medical co-morbidities. The patient received routine nursing cares and at the time of discharge was medically stable. Vital signs were stable throughout admission. The patient is tolerating a regular diet and is voiding spontaneously. All PT/OT goals have been met for safe mobility. Pain is now controlled on oral medications which will be available on discharge. Stool softeners have been used while taking pain medications to help prevent constipation. Foreign Pond is deemed medically safe to discharge on POD2.     Antibiotics:   given periop and 24 hours postop.   DVT prophylaxis:  Mechanical prophylaxis and  mg every day initiated following surgery.  PT Progress:  Has met PT/OT goals for safe mobility.    Pain Meds:  Weaned off all IV pain meds by discharge.  Inpatient Events: No significant events or complications.     PHYSICAL EXAM:    /62 (BP Location: Right arm)    "Pulse 87   Temp 96.5  F (35.8  C) (Oral)   Resp 16   Ht 1.791 m (5' 10.51\")   Wt (!) 151.2 kg (333 lb 5.4 oz)   SpO2 95%   BMI 47.14 kg/m    I/O this shift:  In: -   Out: 30 [Drains:30]  General: NAD. Resting comfortably in bed.  Respiratory: Breathing comfortably with supplementary O2 ON  Drain Output: _/210/30 over last 3 shifts.  Musculoskeletal: Left arm in UltraSling. Deltopectoral Aquacel and drain dressing C/D/I. No erythema. Minimal tenderness to palpation. Sets deltoid. Fires EPL/FPL/IOs. Makes an OK sign. SILT in A/M/R/U nerve distributions. Radial pulse 2+. Fingers WWP with capillary refill <2 seconds.       FOLLOWUP:    Follow up with Dr. Ames at 2 weeks and 6 weeks postoperatively.    Future Appointments   Date Time Provider Department Center   1/8/2020 10:15 AM Held, Donna Vallejo OT UROCANDIDA [place]   1/8/2020  2:00 PM Held, Donna Vallejo OT UROT [place]   1/8/2020  7:00 PM UR PT OVERFLOW URPT [place]   1/23/2020 10:00 AM Zahida Ames MD [facility]     Orthopedic surgery appointments are at the [facility] ([address] [place] [address]). Call [phone] to schedule a follow-up appointment at this location with your provider.     PLANNED DISCHARGE ORDERS:     DVT Prophylaxis:  mg every day x 28 days.     Activity: See below.     Wound Care: See below.      Discharge Medication List as of 1/9/2020 11:37 AM      START taking these medications    Details   aspirin (ASA) 81 MG chewable tablet Take 2 tablets (162 mg) by mouth daily, Disp-28 tablet, R-0, E-Prescribe      polyethylene glycol (MIRALAX/GLYCOLAX) packet Take 17 g by mouth 2 times daily as needed (constipation), Disp-7 packet, R-0, E-Prescribe         CONTINUE these medications which have CHANGED    Details   oxyCODONE (ROXICODONE) 5 MG tablet Take 1-2 tablets (5-10 mg) by mouth every 6 hours as needed for moderate to severe pain, Disp-30 tablet, R-0, E-Prescribe    "   senna-docusate (SENOKOT-S/PERICOLACE) 8.6-50 MG tablet Take 2 tablets by mouth 2 times daily, Disp-40 tablet, R-0, E-Prescribe         CONTINUE these medications which have NOT CHANGED    Details   acetaminophen (TYLENOL) 325 MG tablet Take 650 mg by mouth every 6 hours as needed for mild pain, Historical      diclofenac (VOLTAREN) 1 % topical gel Place 2 g onto the skin 4 times daily, Disp-100 g, R-0, E-Prescribe      Ginger, Zingiber officinalis, (GINGER ROOT PO) Take 1 tablet by mouth daily, Historical      hydrochlorothiazide (HYDRODIURIL) 25 MG tablet Take 1 tablet (25 mg) by mouth daily, Disp-90 tablet, R-3, E-Prescribe      losartan (COZAAR) 100 MG tablet Take 1 tablet (100 mg) by mouth daily, Disp-90 tablet, R-3, E-Prescribe      Probiotic Product (PROBIOTIC PO) Take 4 capsules by mouth daily, Historical      tamsulosin (FLOMAX) 0.4 MG capsule TAKE 1 CAPSULE BY MOUTH ONCE A DAY, Disp-90 capsule, R-3, E-Prescribe      TURMERIC PO Take 1 tablet by mouth daily, Historical         STOP taking these medications       aspirin (ASA) 325 MG EC tablet Comments:   Reason for Stopping:         aspirin 81 MG EC tablet Comments:   Reason for Stopping:         aspirin 81 MG tablet Comments:   Reason for Stopping:         ibuprofen (ADVIL/MOTRIN) 200 MG tablet Comments:   Reason for Stopping:                 Discharge Procedure Orders   Reason for your hospital stay   Order Comments: Postoperative care for shoulder surgery.     Adult [facility] Follow-up and recommended labs and tests   Order Comments: Follow-up with Dr. Ames in 2 weeks and 6 weeks as previously scheduled.     Activity   Order Comments: In order to ensure your total shoulder arthroplasty heals appropriately, you will need to follow some guidelines. Please wear your shoulder sling at all times except for therapy and hygiene. Do not use your operative shoulder to bear weight. You may progress with therapy exercises as instructed in the hospital.      Order Specific Question Answer Comments   Is discharge order? Yes      When to contact your care team   Order Comments: Please call or return if you experience the following:  - Fevers (temperature greater than 100.4 degrees Fahrenheit)  - Pain that is getting worse or does not respond to pain medications  - Drainage from your wound  - Increasing redness around the wound  - Any other worrisome symptoms    You may reach the clinic by dialing [phone]. After hours, you may reach the resident on call by dialing [phone].     Wound care and dressings   Order Comments: You have had a surgery involving your shoulder. You have a dressing called an Aquacel on your incision which can be worn in the shower. You may leave this Aquacel in place for 7-10 days following surgery. After the Aquacel has been removed, you do not need a dressing. It is okay to shower and wash gently with soap and water. Do not soak in the bath. No pools, hot tubs, or lakes for 6 weeks.    After surgery, you may have a sensitive scar. When the dressing has been removed, you may massage the scar to decrease sensitivity and help break down scar tissue. Do this up to 4 times per day.     Full Code     Order Specific Question Answer Comments   Code status determined by: Discussion with patient/legal decision maker      Diet   Order Comments: When you get home, you may resume your normal diet as tolerated. You may not be very hungry but try to eat small healthy meals to help you heal. Remember to drink plenty of water/fluids to help keep you hydrated.     Order Specific Question Answer Comments   Is discharge order? Yes      Jose Madera MD  Orthopedic Surgery PGY-1  [phone]

## 2020-01-08 NOTE — PROGRESS NOTES
01/08/20 1100   Quick Adds   Type of Visit Initial Occupational Therapy Evaluation   Living Environment   Lives With spouse   Living Arrangements house   Home Accessibility no concerns   Transportation Anticipated family or friend will provide   Living Environment Comment Patient lives in one level home, no stairs. Handicap accessible. Higher toilets. Walk in shower.    Self-Care   Usual Activity Tolerance good   Current Activity Tolerance moderate   Equipment Currently Used at Home none   Activity/Exercise/Self-Care Comment Patient works as , active job. Has been dealing with shoulder pain for past 2 months.    Functional Level   Ambulation 0-->independent   Transferring 0-->independent   Toileting 0-->independent   Bathing 0-->independent   Dressing 0-->independent   Eating 0-->independent   Communication 0-->understands/communicates without difficulty   Swallowing 0-->swallows foods/liquids without difficulty   Cognition 0 - no cognition issues reported   Fall history within last six months no   Prior Functional Level Comment IND with ADLs, but dressing tasks were becoming more difficult.    General Information   Onset of Illness/Injury or Date of Surgery - Date 01/07/20   Referring Physician Dr. Ames   Patient/Family Goals Statement To lose more weight, functional use of arm again   Additional Occupational Profile Info/Pertinent History of Current Problem s/p L TSA   Precautions/Limitations other (see comments)  (TSA protocol )   Weight-Bearing Status - LUE nonweight-bearing   Cognitive Status Examination   Cognitive Comment no concerns    Visual Perception   Visual Perception No deficits were identified   Sensory Examination   Sensory Comments NT from block    Pain Assessment   Patient Currently in Pain Yes, see Vital Sign flowsheet   Range of Motion (ROM)   ROM Comment R UE WFL. L UE passive FE to 140* and ER to 40*   Strength   Strength Comments R UE WFL. L UE not tested secondary to  precautions.    Muscle Tone Assessment   Muscle Tone Quick Adds No deficits were identified   Coordination   Upper Extremity Coordination Left UE impaired   Functional Limitations Impaired ability to perform bilateral tasks   Mobility   Bed Mobility Comments IND   Transfer Skill: Bed to Chair/Chair to Bed   Level of [place]: Bed to Chair stand-by assist   Transfer Skill: Sit to Stand   Level of [place]: Sit/Stand stand-by assist   Transfer Skill: Toilet Transfer   Level of [place]: Toilet stand-by assist   Assistive Device grab bars   Upper Body Dressing   Level of [place]: Dress Upper Body moderate assist (50% patients effort)   Physical Assist/Nonphysical Assist: Dress Upper Body set-up required;verbal cues   Lower Body Dressing   Level of [place]: Dress Lower Body minimum assist (75% patients effort)   Physical Assist/Nonphysical Assist: Dress Lower Body set-up required;verbal cues   Toileting   Level of [place]: Toilet stand-by assist   Grooming   Level of [place]: Grooming stand-by assist   Eating/Self Feeding   Level of [place]: Eating independent   Activities of Daily Living Analysis   Impairments Contributing to Impaired Activities of Daily Living pain;post surgical precautions;ROM decreased;strength decreased   General Therapy Interventions   Planned Therapy Interventions ADL retraining;home program guidelines   Clinical Impression   Criteria for Skilled Therapeutic Interventions Met yes, treatment indicated   OT Diagnosis Decreased IND with ADLs   Influenced by the following impairments pain, post op precautions   Assessment of Occupational Performance 3-5 Performance Deficits   Identified Performance Deficits dressing, bathing, toileting, home mgmt   Clinical Decision Making (Complexity) Low complexity   Therapy Frequency 2x/day   Predicted Duration of Therapy Intervention (days/wks) 2 days   Anticipated Equipment Needs at Discharge other (see comments)  (pulleys )  "  Anticipated Discharge Disposition Home with Assist   Risks and Benefits of Treatment have been explained. Yes   Patient, Family & other staff in agreement with plan of care Yes   [facility] TM \"6 Clicks\"   2016, Trustees of [facility], under license to [employer].  All rights reserved.   6 Clicks Short Forms Daily Activity Inpatient Short Form   [facility]  \"6 Clicks\" Daily Activity Inpatient Short Form   1. Putting on and taking off regular lower body clothing? 3 - A Little   2. Bathing (including washing, rinsing, drying)? 2 - A Lot   3. Toileting, which includes using toilet, bedpan or urinal? 4 - None   4. Putting on and taking off regular upper body clothing? 2 - A Lot   5. Taking care of personal grooming such as brushing teeth? 4 - None   6. Eating meals? 4 - None   Daily Activity Raw Score (Score out of 24.Lower scores equate to lower levels of function) 19   Total Evaluation Time   Total Evaluation Time (Minutes) 8     "

## 2020-01-08 NOTE — PLAN OF CARE
VS: VSS, pt denies nausea, CP or SOB.   O2: Room air sat. >90%.   Output: Voiding adequate amount in the bathroom.    Last BM: 01/06/20   Activity: Up independent in the room and walked on the larsen.    Skin: Intact except surgical incision.    Pain: Comfortably manageable with schedule tylenol.    CMS: CMS and neuro intact.    Dressing: CDI.    Diet: Regular tolerating without N/V.    LDA: PIV SL, Hemovac drain.   Equipment: IV pole, PCD and personal belongings.    Plan: TBD.    Additional Info:

## 2020-01-08 NOTE — PLAN OF CARE
Pt. A&Ox4. VSS. Afebrile. Lung sounds CTA. Maintaining sats w/ 1L O2 via Oxy plus mask, pt has apneic periods while asleep. IS encouraged. Bowel sounds active, LBM 1/6, flatus +. PP+ DP+. CMS and neuro's are intact. Reports mild numbness in L hand. Denies nausea, shortness of breath, and chest pain. C/o indigestion overnight, overnight hospitalst paged, given TUMS and IV pepcid. Pt not yet reporting pain in LUE, given scheduled Tylenol and ice applied. Voids spontaneously without difficulty in the BR. Tolerating regular diet. LUE incisional dressing is CDI. Hemovac in place, new bag applied overnight, output 120mL. Pt up with SBA. Ambulated in hallway overnight. PIV is patent and SL between abx. PCD's on BLE's. Bilateral heels are elevated off the bed. Call light is within reach, pt able to make needs known and is resting comfortably between cares. Will continue to monitor. Pt's wife is at bedside.

## 2020-01-08 NOTE — PLAN OF CARE
Discharge Planner OT   Patient plan for discharge: home tomorrow am   Current status: Educated on post op precautions and impact on ADLs. Mod-max A to don/doff sling. Patient completed HEP using pulleys and wand, achieving 140* FE and 40* ER with assist for technique. Patient min A for LB dressing. Patient ambulated in hallway with SBA.   Barriers to return to prior living situation: pain, post op precautions  Recommendations for discharge: home with assist from wife as needed for ADLs/household tasks  Rationale for recommendations: will continue with OT to maximize IND with one handed ADLs and HEP       Entered by: ROBERTO LINDA HELD 01/08/2020 11:54 AM

## 2020-01-08 NOTE — PLAN OF CARE
VS: VSS, pt denies nausea, CP or SOB.   O2: On 2 L NC sat. > 90%.   Output: Voided good amount x one.   Last BM: 01/06/19.   Activity: Up at bedside using urinal.   Skin: Intact except surgical incision.    Pain: Denies pain or discomfort.    CMS: CMS and neuro intact except some numbness on LUE from block.    Dressing: CDI.    Diet: Regular tolerating without N/V.    LDA: PIV infusing.    Equipment: IV pole, PCD, shoulder sling and personal belongings.    Plan: TBD.    Additional Info:

## 2020-01-08 NOTE — PROGRESS NOTES
"Orthopedic Surgery Progress Note: 01/08/2020    Subjective:   No acute events overnight. Block no longer in effect. Pain well-controlled. Tolerating a regular diet. Voiding spontaneously. Not yet moving bowels, but passing gas. Denies CP/SOB/N/V.  No new concerns or complaints.    Objective:   /73   Pulse 87   Temp 95.8  F (35.4  C) (Oral)   Resp 19   Ht 1.791 m (5' 10.51\")   Wt (!) 151.2 kg (333 lb 5.4 oz)   SpO2 94%   BMI 47.14 kg/m    I/O this shift:  In: -   Out: 120 [Drains:120]  General: NAD. Resting comfortably in bed.  Respiratory: Breathing comfortably with supplementary O2 ON  Drain Output: 280/120 over last 2 shifts.  Musculoskeletal: Left arm in UltraSling. Deltopectoral Aquacel and drain dressing C/D/I. No erythema. Minimal tenderness to palpation. Sets deltoid. Fires EPL/FPL/IOs. Makes an OK sign. SILT in A/M/R/U nerve distributions. Radial pulse 2+. Fingers WWP with capillary refill <2 seconds.    Laboratory Data:  Lab Results   Component Value Date    WBC 6.8 12/20/2019    HGB 14.3 12/20/2019     12/20/2019       Images:  Portable radiographs of the left shoulder were ordered this morning. They will be reviewed with Dr. Ames.     Assessment & Plan:   Foreign Pond is a 59 year old male with PMH including  HTN, BPH, HLD,  Mild intermittent Asthma, and morbid obesity  now s/p left Total shoulder replacement on 1/7/20 with Dr. Ames.     Goals for 01/08/20:  - Mobilize with PT/OT/nursing  - Leave drain; document output     - Orthopedics Primary  - Activity: Up with assist until independent.   - Weightbearing status: NWB LUE.  - Pain management: Transition from IV to PO as tolerated.   - Antibiotics: Cefazolin 1 g x 2 doses postoperatively.  - Diet: regular  - DVT prophylaxis: SCDs on.  mg daily x 4 weeks.  - Imaging: Right shoulder AP & Grashey on floor.   - Labs: HGB POD1.  - Bracing/Splinting: Keep sling on at all times except PT and hygiene.  - Dressings: Keep " Aquacel C/D/I x 7 days.  - Drains: Document output per shift, will be discontinued at orthopedic surgery discretion.   - Physical Therapy/Occupational Therapy: Evaluation and treatment. Follow TSA protocol. Passive ROM and AAROM of the shoulder with FE to 140 degrees and ER at the side to 40 degrees.  - Consults: Hospitalist, PT, OT  - Follow-up: Clinic in 2 weeks for wound check and repeat radiographs with Dr. Ames      - Disposition: Pending progress with therapies, pain control on orals, and medical stability; anticipate discharge to home today.     Orthopedic surgery staff for this patient is Dr. Ames.     ------------------------------------------------------------------------------------------    Jose Madera MD  Orthopedic Surgery PGY1  [phone]    Please page me directly with any questions/concerns during regular weekday hours before 5 pm. If there is no response, if it is a weekend, or if it is during evening hours then please page the orthopedic surgery resident on call.    FOLLOWUP:    Future Appointments   Date Time Provider Department Center   1/8/2020 10:15 AM Held, Donna Vallejo OT UROT [place]   1/8/2020  2:00 PM Held, Donna Vallejo OT UROT [place]   1/8/2020  7:00 PM UR PT OVERFLOW URPT [place]   1/23/2020 10:00 AM Zahida Ames MD [facility]

## 2020-01-08 NOTE — PROGRESS NOTES
"[facility], [place]   Internal Medicine Daily Note           Interval History/Events     Overnight events reviewed  Reports doing overall okay  Pain is reasonably controlled  No nausea, vomiting, lightheadedness or dizziness  No BM yet, but passing gas  NO issues with urination           Review of Systems        4 point ROS including Respiratory, CV, GI and , other than that noted above is negative      Medications   I have reviewed current medications  in the \"current medication\" section of Epic.  Relevant changes include:     Physical Exam   General:       Vital signs:    Blood pressure 123/73, pulse 87, temperature 96.2  F (35.7  C), temperature source Oral, resp. rate 16, height 1.791 m (5' 10.51\"), weight (!) 151.2 kg (333 lb 5.4 oz), SpO2 93 %.  Estimated body mass index is 47.14 kg/m  as calculated from the following:    Height as of this encounter: 1.791 m (5' 10.51\").    Weight as of this encounter: 151.2 kg (333 lb 5.4 oz).      Intake/Output Summary (Last 24 hours) at 1/8/2020 1134  Last data filed at 1/8/2020 0356  Gross per 24 hour   Intake 1000 ml   Output 1200 ml   Net -200 ml      HEENT: No icterus, no pallor  Cardiovascular: S1, S2 normal  Respiratory: B/L CTA  Abdomen: Soft, NT, BS+  Neurology: Alert, awake,and oriented to time place person. Seemed little confused. No focal neuro deficit.   Extremities: Left shoulder dressing in place.            Laboratory and Imaging Studies     I have reviewed  laboratory and imaging studies in the Epic. Pertinent findings are as below:    BMP  Recent Labs   Lab 01/07/20  0910   POTASSIUM 4.2   *     CBC  Recent Labs   Lab 01/08/20  0559   HGB 12.4*     INRNo lab results found in last 7 days.  LFTsNo lab results found in last 7 days.   PANCNo lab results found in last 7 days.        Impression/Plan          59 year old year old male with hx of HTN, BPH, HLD,  Mild intermittent Asthma is admitted s/p Left total shoulder " arthroplasty on 01/07/2020     # s/p Left total shoulder arthroplasty on 01/07/2020  On Analgesics, and dressing in place  - Wound cares, Dressings, Surgical pain management, DVT Prophylaxis  per primary team.   - Monitor anemia, hemodynamics, Input/Output  - Monitor Capnogrphy  - Encourage Incentive spirometry  - Laxatives for constipation prophylaxis     # Anemia: Most likely due to Hemodilution, blood loss, and post surgical inflammation  Hg 12.4 gm/dl    - Transfuse if Hg < 7 gm/dl      # Hx of HTN: PTA on Losartan, hydrochlorothiazide   BP normal.   - Hold for now     # Hx of Urinary incontinence: PTA on Tamsulosin  - Continue     # Asthma: Not on any home inhalers  - Monitor respiratory status     # Hx of snoring: No official diagnosis of JAZMYNE. High risk for it  - Minimize narcotics  - Pulse oximetery/Capnography             Pt's care was discussed with bedside RN, patient and  during Care Team Rounds.               Michael Mera MD  Hospitalist ( Internal medicine)  Pager: [phone]

## 2020-01-08 NOTE — PHARMACY-ADMISSION MEDICATION HISTORY
Admission medication history interview status for the 1/7/2020 admission is complete. See Epic admission navigator for allergy information, pharmacy, prior to admission medications and immunization status.     Medication history interview sources:  Patient, RxBenefits    Changes made to PTA medication list (reason)  Added: Turmeric, ginger root, probiotic, aspirin  Deleted: none  Changed:    Diclofenac QID > QID prn    Additional medication history information (including reliability of information, actions taken by pharmacist):   1. Patient was a reliable historian. Knew medication names, doses, and frequencies. Did not know doses of OTC medications nor the type of probiotic he takes.   2. Patient confirmed allergies and reactions. They have no additional allergies to report.    3. Patient has not received the influenza vaccine for the current flu season.    Prior to Admission medications    Medication Sig Last Dose Taking? Auth Provider   acetaminophen (TYLENOL) 325 MG tablet Take 650 mg by mouth every 6 hours as needed for mild pain 1/6/2020 at Unknown time Yes Reported, Patient   aspirin (ASA) 325 MG EC tablet Take 1 tablet (325 mg) by mouth daily for 28 days  Yes Jose Madera MD   aspirin 81 MG EC tablet Take 81 mg by mouth daily Past Month at Unknown time Yes Unknown, Entered By History   diclofenac (VOLTAREN) 1 % topical gel Place 2 g onto the skin 4 times daily  Patient taking differently: Place 2 g onto the skin 4 times daily as needed  Past Week at Unknown time Yes Zahida Ames MD   Ginger, Zingiber officinalis, (GINGER ROOT PO) Take 1 tablet by mouth daily  Yes Unknown, Entered By History   hydrochlorothiazide (HYDRODIURIL) 25 MG tablet Take 1 tablet (25 mg) by mouth daily Past Week at Unknown time Yes Joleen Garcia APRN CNP   losartan (COZAAR) 100 MG tablet Take 1 tablet (100 mg) by mouth daily 1/6/2020 at 0800 Yes Joleen Garcia APRN CNP   oxyCODONE (ROXICODONE) 5 MG tablet Take 1-2  tablets (5-10 mg) by mouth every 6 hours as needed for moderate to severe pain  Yes Jose Madera MD   Probiotic Product (PROBIOTIC PO) Take 4 capsules by mouth daily  Yes Unknown, Entered By History   senna-docusate (SENOKOT-S/PERICOLACE) 8.6-50 MG tablet Take 2 tablets by mouth 2 times daily for 20 days  Yes Jose Madera MD   tamsulosin (FLOMAX) 0.4 MG capsule TAKE 1 CAPSULE BY MOUTH ONCE A DAY 1/6/2020 at Unknown time Yes Joleen Garcia APRN CNP   TURMERIC PO Take 1 tablet by mouth daily  Yes Unknown, Entered By History         Medication history completed by: Sonia Donald, PharmD

## 2020-01-09 ENCOUNTER — PATIENT OUTREACH (OUTPATIENT)
Dept: CARE COORDINATION | Facility: CLINIC | Age: 60
End: 2020-01-09

## 2020-01-09 ENCOUNTER — APPOINTMENT (OUTPATIENT)
Dept: OCCUPATIONAL THERAPY | Facility: CLINIC | Age: 60
End: 2020-01-09
Attending: ORTHOPAEDIC SURGERY
Payer: COMMERCIAL

## 2020-01-09 VITALS
TEMPERATURE: 97 F | WEIGHT: 315 LBS | HEIGHT: 71 IN | SYSTOLIC BLOOD PRESSURE: 127 MMHG | BODY MASS INDEX: 44.1 KG/M2 | DIASTOLIC BLOOD PRESSURE: 68 MMHG | OXYGEN SATURATION: 95 % | RESPIRATION RATE: 15 BRPM | HEART RATE: 76 BPM

## 2020-01-09 LAB — HGB BLD-MCNC: 11.6 G/DL (ref 13.3–17.7)

## 2020-01-09 PROCEDURE — 25000132 ZZH RX MED GY IP 250 OP 250 PS 637: Performed by: STUDENT IN AN ORGANIZED HEALTH CARE EDUCATION/TRAINING PROGRAM

## 2020-01-09 PROCEDURE — 99232 SBSQ HOSP IP/OBS MODERATE 35: CPT | Performed by: INTERNAL MEDICINE

## 2020-01-09 PROCEDURE — 85018 HEMOGLOBIN: CPT | Performed by: ORTHOPAEDIC SURGERY

## 2020-01-09 PROCEDURE — 36415 COLL VENOUS BLD VENIPUNCTURE: CPT | Performed by: ORTHOPAEDIC SURGERY

## 2020-01-09 PROCEDURE — 25000132 ZZH RX MED GY IP 250 OP 250 PS 637: Performed by: INTERNAL MEDICINE

## 2020-01-09 PROCEDURE — 97110 THERAPEUTIC EXERCISES: CPT | Mod: GO

## 2020-01-09 PROCEDURE — 97535 SELF CARE MNGMENT TRAINING: CPT | Mod: GO

## 2020-01-09 RX ORDER — ASPIRIN 325 MG
325 TABLET, DELAYED RELEASE (ENTERIC COATED) ORAL DAILY
Qty: 28 TABLET | Refills: 0 | Status: SHIPPED | OUTPATIENT
Start: 2020-01-09 | End: 2020-01-09

## 2020-01-09 RX ORDER — POLYETHYLENE GLYCOL 3350 17 G/17G
17 POWDER, FOR SOLUTION ORAL 2 TIMES DAILY PRN
Qty: 7 PACKET | Refills: 0 | Status: SHIPPED | OUTPATIENT
Start: 2020-01-09

## 2020-01-09 RX ORDER — ASPIRIN 81 MG/1
162 TABLET, CHEWABLE ORAL DAILY
Qty: 28 TABLET | Refills: 0 | Status: SHIPPED | OUTPATIENT
Start: 2020-01-09

## 2020-01-09 RX ORDER — AMOXICILLIN 250 MG
2 CAPSULE ORAL 2 TIMES DAILY
Qty: 40 TABLET | Refills: 0 | Status: SHIPPED | OUTPATIENT
Start: 2020-01-09

## 2020-01-09 RX ORDER — HYDROCHLOROTHIAZIDE 25 MG/1
25 TABLET ORAL DAILY
Status: DISCONTINUED | OUTPATIENT
Start: 2020-01-09 | End: 2020-01-09 | Stop reason: HOSPADM

## 2020-01-09 RX ORDER — OXYCODONE HYDROCHLORIDE 5 MG/1
5-10 TABLET ORAL EVERY 6 HOURS PRN
Qty: 30 TABLET | Refills: 0 | Status: SHIPPED | OUTPATIENT
Start: 2020-01-09 | End: 2020-10-02

## 2020-01-09 RX ORDER — LOSARTAN POTASSIUM 100 MG/1
100 TABLET ORAL DAILY
Status: DISCONTINUED | OUTPATIENT
Start: 2020-01-09 | End: 2020-01-09 | Stop reason: HOSPADM

## 2020-01-09 RX ADMIN — TAMSULOSIN HYDROCHLORIDE 0.4 MG: 0.4 CAPSULE ORAL at 07:51

## 2020-01-09 RX ADMIN — SENNOSIDES AND DOCUSATE SODIUM 2 TABLET: 8.6; 5 TABLET ORAL at 07:51

## 2020-01-09 RX ADMIN — ASPIRIN 325 MG: 325 TABLET, DELAYED RELEASE ORAL at 07:51

## 2020-01-09 RX ADMIN — LOSARTAN POTASSIUM 100 MG: 100 TABLET, FILM COATED ORAL at 11:20

## 2020-01-09 RX ADMIN — HYDROCHLOROTHIAZIDE 25 MG: 25 TABLET ORAL at 11:20

## 2020-01-09 RX ADMIN — ACETAMINOPHEN 975 MG: 325 TABLET, FILM COATED ORAL at 07:50

## 2020-01-09 ASSESSMENT — ACTIVITIES OF DAILY LIVING (ADL)
FALL_HISTORY_WITHIN_LAST_SIX_MONTHS: NO
RETIRED_COMMUNICATION: 0-->UNDERSTANDS/COMMUNICATES WITHOUT DIFFICULTY
AMBULATION: 0-->INDEPENDENT
TOILETING: 0-->INDEPENDENT
DRESS: 0-->INDEPENDENT
BATHING: 0-->INDEPENDENT
SWALLOWING: 0-->SWALLOWS FOODS/LIQUIDS WITHOUT DIFFICULTY
RETIRED_EATING: 0-->INDEPENDENT
COGNITION: 0 - NO COGNITION ISSUES REPORTED
TRANSFERRING: 0-->INDEPENDENT

## 2020-01-09 NOTE — PLAN OF CARE
Discharge Planner OT   Patient plan for discharge: home   Current status: Patient's wife assists with sling mgmt, provides max A. Mod A for UB dressing using one handed technique. Patient completed HEP with increased difficultly this date, was able to achieve ROM goals but needed assist for ER. Discussed following up with home PT or outpatient PT to ensure progress/ease with HEP.   Barriers to return to prior living situation: pain  Recommendations for discharge: home with assist from wife for ADLs as needed/household tasks and home PT/outpatient PT  Rationale for recommendations: patient plans to discharge home today with assist from wife. Has met inpatient goals, has some difficulty with HEP, in particular technique for ER.        Entered by: ROBERTO LOMBARDI 01/09/2020 10:49 AM        Occupational Therapy Discharge Summary    Reason for therapy discharge:    Discharged to home with home therapy.    Progress towards therapy goal(s). See goals on Care Plan in [facility] electronic health record for goal details.  Most goals met, all goals addressed. Patient needs some assist for technique with ER, however able to achieve ROM goals with assist.     Therapy recommendation(s):    Continue home exercise program.  Home PT/outpatient PT follow up to progress HEP

## 2020-01-09 NOTE — PROGRESS NOTES
Care Coordinator Progress Note    Admission Date/Time:  1/7/2020  Attending MD:  Zahida Ames, *    Data  Chart reviewed, discussed with interdisciplinary team.   Patient was admitted for:    Arthritis of shoulder region, left  Arthritis of shoulder region, left  Status post total shoulder replacement, left.    Concerns with insurance coverage for discharge needs: None.  Current Living Situation: Patient lives with spouse.  Support System: Supportive and Involved  Services Involved: NA  Transportation at Discharge: Family or friend will provide  Transportation to Medical Appointments:   - Name of caregiver: Ciera wife  Barriers to Discharge: NA       Assessment  Per chart review no home care or outpatient therapies indicated at this time. RNCC available as needed.     Update: After this mornings therapy session patient would be appropriate for home care. This writer met with patient at bedside to discuss home care options. A referral was sent to [facility] for skilled nursing and physical therapy. All therapy orders completed. RNCC available as needed.    [facility]  Phone [phone]  Fax [phone]     Plan  Anticipated Discharge Date:  1/9/2020  Anticipated Discharge Plan:  Home with family assist.    Julianna Burks RN, BSN  Care Coordinator, 8A  Phone [phone]  Pager [phone]

## 2020-01-09 NOTE — PROGRESS NOTES
"Orthopedic Surgery Progress Note: 01/09/2020    Subjective:   No acute events overnight. Block no longer in effect. Pain well-controlled. Tolerating a regular diet. Voiding spontaneously. Small BM overnight. Denies CP/SOB/N/V.  No new concerns or complaints. All questions answered at bedside.     Objective:   /62 (BP Location: Right arm)   Pulse 87   Temp 96.5  F (35.8  C) (Oral)   Resp 16   Ht 1.791 m (5' 10.51\")   Wt (!) 151.2 kg (333 lb 5.4 oz)   SpO2 95%   BMI 47.14 kg/m    I/O this shift:  In: -   Out: 30 [Drains:30]  General: NAD. Resting comfortably in bed.  Respiratory: Breathing comfortably with supplementary O2 ON  Drain Output: _/210/30 over last 3 shifts.  Musculoskeletal: Left arm in UltraSling. Deltopectoral Aquacel and drain dressing C/D/I. No erythema. Minimal tenderness to palpation. Sets deltoid. Fires EPL/FPL/IOs. Makes an OK sign. SILT in A/M/R/U nerve distributions. Radial pulse 2+. Fingers WWP with capillary refill <2 seconds.    Laboratory Data:  Lab Results   Component Value Date    WBC 6.8 12/20/2019    HGB 12.4 (L) 01/08/2020     12/20/2019       Images:  XR of left shoulder reveals good positioning/allignment of left TSA. No evidence of fracture or implant loosening.    Assessment & Plan:   Foreign Pond is a 59 year old male with PMH including  HTN, BPH, HLD,  Mild intermittent Asthma, and morbid obesity  now s/p left Total shoulder replacement on 1/7/20 with Dr. Ames.     Goals for 01/08/20:  - Remove Drain this am  - Discharge today.      - Orthopedics Primary  - Activity: Up with assist until independent.   - Weightbearing status: NWB LUE.  - Pain management: Transition from IV to PO as tolerated.   - Antibiotics: Cefazolin 1 g x 2 doses postoperatively.  - Diet: regular  - DVT prophylaxis: SCDs on.  mg daily x 4 weeks.  - Imaging: Right shoulder AP & Grashey on floor.   - Labs: HGB POD1.  - Bracing/Splinting: Keep sling on at all times except PT and " hygiene.  - Dressings: Keep Aquacel C/D/I x 7 days.  - Drains: Discontinue today  - Physical Therapy/Occupational Therapy: Evaluation and treatment. Follow TSA protocol. Passive ROM and AAROM of the shoulder with FE to 140 degrees and ER at the side to 40 degrees.  - Consults: Hospitalist, PT, OT  - Follow-up: Clinic in 2 weeks for wound check and repeat radiographs with Dr. Ames      - Disposition: . Anticipate discharge to home today.     Orthopedic surgery staff for this patient is Dr. Ames.     ------------------------------------------------------------------------------------------    Jose Madera MD  Orthopedic Surgery PGY1  [phone]    Please page me directly with any questions/concerns during regular weekday hours before 5 pm. If there is no response, if it is a weekend, or if it is during evening hours then please page the orthopedic surgery resident on call.    FOLLOWUP:    Future Appointments   Date Time Provider Department Center   1/8/2020 10:15 AM Donna Sanchez OT UROT [place]   1/8/2020  2:00 PM Donna Sanchez OT UROT [place]   1/8/2020  7:00 PM UR PT OVERFLOW URPT [place]   1/23/2020 10:00 AM Zahida Ames MD [facility]

## 2020-01-09 NOTE — PROGRESS NOTES
A/Ox's 4. Pt rated pain as tolerable. Tylenol and Ice packs given for pain control. Dressing CDI. Pt has a drain. CMS intact. Tolerated regular diet. Denied any nausea, CP, SOB, lightheadedness or dizziness. Voiding without pain or difficulty. Passing flatus and had a small BM per patient. Up with SBA. Resting in bed at this time with call light in reach. Able to make needs known. Continue to monitor.

## 2020-01-09 NOTE — PLAN OF CARE
Pt A/O X 4. Afebrile. VSS. Lungs-Clear bilaterally with both anterior and posterior. Bowels-Hyperactive in all four quadrants. Voids spontaneously without difficulty in the bathroom. Denies nausea and vomiting. CMS and Neuro's are intact. Has slight numbness in the left fingers. Has pain in the left shoulder and given Tylenol, and pain is manageable. Is on a Regular diet and appetite was Good this shift. Left shoulder incisional dressing is C/D/I and in a sling. Pt up in room independently. PIV removed for discharge. Bilateral heels are elevated off the bed. Pt is able to make needs known, and call light is within reach. Pt. discharged at 12:45 to home, was accompanied by his spouse, and left with personal belongings. Pt. received complete discharge paperwork and PO medications as filled by discharge pharmacy. Pt. was given times of last dose for all discharge medications in writing on discharge medication sheets. Discharge teaching included PO medication, pain management, activity restrictions, dressing changes, and signs and symptoms of infection. Pt. had no further questions at the time of discharge and no unmet needs were identified.

## 2020-01-09 NOTE — PROGRESS NOTES
"[facility], [place]   Internal Medicine Daily Note           Interval History/Events     Overnight events reviewed  Reports doing well  Feels full in the abdomen, has some nausea  Pain is controlled with analgesics  No chest pain, shortness of breath  No fever, chills.  No lightheadedness or dizziness           Review of Systems        4 point ROS including Respiratory, CV, GI and , other than that noted above is negative      Medications   I have reviewed current medications  in the \"current medication\" section of Epic.  Relevant changes include:     Physical Exam   General:       Vital signs:    Blood pressure (!) 137/99, pulse 87, temperature 98.4  F (36.9  C), temperature source Oral, resp. rate 15, height 1.791 m (5' 10.51\"), weight (!) 151.2 kg (333 lb 5.4 oz), SpO2 95 %.  Estimated body mass index is 47.14 kg/m  as calculated from the following:    Height as of this encounter: 1.791 m (5' 10.51\").    Weight as of this encounter: 151.2 kg (333 lb 5.4 oz).      Intake/Output Summary (Last 24 hours) at 1/8/2020 1134  Last data filed at 1/8/2020 0356  Gross per 24 hour   Intake 1000 ml   Output 1200 ml   Net -200 ml      Constitutional: Laying on bed in no acute distress  Eye: No icterus, no pallor  Mouth/ENT: Moist oral mucosa  Respiratory: B/l  CTA  Cardiovascular: S1, S2 normal. No pretibial edema  GI: Soft, NT, BS+  : No carey's catheter  Neuro: Alert, awake, and conversing  Psych: Normal mood  Integumentary: No rashes  MSK:  left UE sling in place             Laboratory and Imaging Studies     I have reviewed  laboratory and imaging studies in the Epic. Pertinent findings are as below:    BMP  Recent Labs   Lab 01/07/20  0910   POTASSIUM 4.2   *     CBC  Recent Labs   Lab 01/09/20  0619   HGB 11.6*     INRNo lab results found in last 7 days.  LFTsNo lab results found in last 7 days.   PANCNo lab results found in last 7 days.        Impression/Plan          59 year old " year old male with hx of HTN, BPH, HLD,  Mild intermittent Asthma is admitted s/p Left total shoulder arthroplasty on 01/07/2020     # s/p Left total shoulder arthroplasty on 01/07/2020  On Analgesics, and dressing in place  - Wound cares, Dressings, Surgical pain management, DVT Prophylaxis  per primary team.   - Monitor anemia, hemodynamics, Input/Output  - Monitor Capnogrphy  - Encourage Incentive spirometry  - Laxatives for constipation prophylaxis     # Anemia: Most likely due to Hemodilution, blood loss, and post surgical inflammation  Hg 11.6 gm/dl on 01/09  - Transfuse if Hg < 7 gm/dl      # Hx of HTN: PTA on Losartan, hydrochlorothiazide   /99 this AM  - Restart hydrochlorothiazide, and Losartan     # Hx of Urinary incontinence: PTA on Tamsulosin  - Continue     # Asthma: Not on any home inhalers  - Monitor respiratory status     # Hx of snoring: No official diagnosis of JAZMYNE. High risk for it  - Minimize narcotics  - Pulse oximetery/Capnography             Pt's care was discussed with bedside RN, patient and  during Care Team Rounds.               Michael Mera MD  Hospitalist ( Internal medicine)  Pager: [phone]

## 2020-01-10 ENCOUNTER — MEDICAL CORRESPONDENCE (OUTPATIENT)
Dept: HEALTH INFORMATION MANAGEMENT | Facility: CLINIC | Age: 60
End: 2020-01-10

## 2020-01-19 ENCOUNTER — TRANSFERRED RECORDS (OUTPATIENT)
Dept: HEALTH INFORMATION MANAGEMENT | Facility: CLINIC | Age: 60
End: 2020-01-19

## 2020-01-23 ENCOUNTER — OFFICE VISIT (OUTPATIENT)
Dept: ORTHOPEDICS | Facility: CLINIC | Age: 60
End: 2020-01-23
Payer: COMMERCIAL

## 2020-01-23 VITALS — DIASTOLIC BLOOD PRESSURE: 78 MMHG | SYSTOLIC BLOOD PRESSURE: 106 MMHG | HEART RATE: 80 BPM | OXYGEN SATURATION: 96 %

## 2020-01-23 DIAGNOSIS — Z96.612 STATUS POST REPLACEMENT OF LEFT SHOULDER JOINT: Primary | ICD-10-CM

## 2020-01-23 PROCEDURE — 99024 POSTOP FOLLOW-UP VISIT: CPT | Performed by: ORTHOPAEDIC SURGERY

## 2020-01-23 ASSESSMENT — PAIN SCALES - GENERAL: PAINLEVEL: EXTREME PAIN (8)

## 2020-01-23 NOTE — PATIENT INSTRUCTIONS
Thanks for coming today.  [facility]tho[facility] Medicine Clinic  [address] [place] [address]    To schedule future appointments in Ortho Clinic, you may call [phone].    To schedule ordered imaging by your provider:   Call Central Imaging [phone]    To schedule an injection ordered by your provider:  Call Central Imaging Injection scheduling line: [phone]  [email or web address] available online at:  [email or web address]    Please call if any further questions or concerns ([phone]).  Clinic hours 8 am to 5 pm.    Return to clinic (call) if symptoms worsen or fail to improve.

## 2020-01-23 NOTE — LETTER
1/23/2020         RE: Foreign Pond  [address] [facility] [facility] [address]        Dear Colleague,    Thank you for referring your patient, Foreign Pond, to the [facility]. Please see a copy of my visit note below.    CHIEF CONCERN: Status post left total shoulder arthroplasty  DATE OF SURGERY: 07/07/2020    HISTORY OF PRESENT ILLNESS: Foreign is an extremely pleasant 59 year-old left hand dominant male who is two weeks status post the above procedure.  He did not take any oxycodone following surgery and has only been taking Tylenol 1000 mg every 8 hours and Aspiring 162 mg daily.  Typically pain is between a 5-7 on a scale of 1-10.  Pain today is an 8 on a scale of 1-10.  He states the incision looks good.  He has been wearing the sling 100% of the time with the exception of bathing and when doing PT exercises.  He has a physical therapist coming to he house twice a week.      EXAM:  Pleasant adult male in NAD. Seen with his wife.  Respirations even and unlabored.  Left upper extremity: Incision clean, dry, and intact. Distally neurovascularly intact without deficits. Shoulder range of motion is assisted FE to 135 and ER at side to 30    IMAGING: None new    ASSESSMENT:  1. Two weeks status post above procedure    PLAN:    Range of Motion: Continue passive and active assisted ROM of the shoulder per operative note.     Sling: Continue sling except for bathing/dressing/rehab    Pain medication: Reviewed plan to be off all narcotic pain medications. NSAIDs and Tylenol PRN    Follow up: in 4 weeks.  Will discontinue sling at that time and initiate AROM. Plan to advance to strengthening at 3 months         Again, thank you for allowing me to participate in the care of your patient.        Sincerely,        Zahida Ames MD

## 2020-01-23 NOTE — NURSING NOTE
Foreign Pond's chief complaint for this visit includes:  Chief Complaint   Patient presents with     Left Shoulder - Total Joint Post-op     2 wks S/p Left total shoulder arthroplasty DOS: 01/07/2020     PCP: Uli Owens    Referring Provider:  No referring provider defined for this encounter.    /78 (BP Location: Right arm, Patient Position: Sitting, Cuff Size: Adult Regular)   Pulse 80   SpO2 96%   Extreme Pain (8)     Do you need any medication refills at today's visit? No    Charlene Brady, CMA

## 2020-01-23 NOTE — PROGRESS NOTES
CHIEF CONCERN: Status post left total shoulder arthroplasty  DATE OF SURGERY: 07/07/2020    HISTORY OF PRESENT ILLNESS: Foreign is an extremely pleasant 59 year-old left hand dominant male who is two weeks status post the above procedure.  He did not take any oxycodone following surgery and has only been taking Tylenol 1000 mg every 8 hours and Aspiring 162 mg daily.  Typically pain is between a 5-7 on a scale of 1-10.  Pain today is an 8 on a scale of 1-10.  He states the incision looks good.  He has been wearing the sling 100% of the time with the exception of bathing and when doing PT exercises.  He has a physical therapist coming to he house twice a week.      EXAM:  Pleasant adult male in [place]. Seen with his wife.  Respirations even and unlabored.  Left upper extremity: Incision clean, dry, and intact. Distally neurovascularly intact without deficits. Shoulder range of motion is assisted FE to 135 and ER at side to 30    IMAGING: None new    ASSESSMENT:  1. Two weeks status post above procedure    PLAN:    Range of Motion: Continue passive and active assisted ROM of the shoulder per operative note.     Sling: Continue sling except for bathing/dressing/rehab    Pain medication: Reviewed plan to be off all narcotic pain medications. NSAIDs and Tylenol PRN    Follow up: in 4 weeks.  Will discontinue sling at that time and initiate AROM. Plan to advance to strengthening at 3 months

## 2020-01-28 ENCOUNTER — TELEPHONE (OUTPATIENT)
Dept: ORTHOPEDICS | Facility: CLINIC | Age: 60
End: 2020-01-28

## 2020-01-28 DIAGNOSIS — Z96.612 STATUS POST REPLACEMENT OF LEFT SHOULDER JOINT: Primary | ICD-10-CM

## 2020-01-28 NOTE — TELEPHONE ENCOUNTER
[facility] Call Center    Phone Message    May a detailed message be left on voicemail: yes    Reason for Call: Other: Yaneli is requesting a call back to clarify restrictions for pt, progression of therapy and need orders for outpatient therapy.      Action Taken: Message routed to:  [facility] Ortho

## 2020-01-28 NOTE — TELEPHONE ENCOUNTER
Per LOV note on 1/23/20: Continue passive and active assisted ROM of the shoulder per operative note, f/u at 6 weeks post op, will discontinue sling and initiate AROM at that time, advancing to strengthening at 3 months.      Currently doing 140 shoulder flexion, 40 degrees of should external rotation at neutral.  Message sent to Dr. Ames to clarify PT order, will then place and fax to below.    Attn: ANKIT Groves  Fax: [phone]    Marcos Ford RN

## 2020-02-03 NOTE — TELEPHONE ENCOUNTER
[facility] Call Center    Phone Message    May a detailed message be left on voicemail: yes    Reason for Call: Other: Yaneli at [facility] called as she has not heard back about PT orders, from Dr. Ames.  PT orders had to be clarified and then faxed to [phone].  Please follow up with Yaneli at [phone].  Thank you!     Action Taken: Message routed to:  [facility]

## 2020-02-06 NOTE — TELEPHONE ENCOUNTER
Discussed with Dr. Ames in clinic today. No restrictions on almost all movements. Only thing restricted right now is no internal rotation up the back until 3 months post op. New order signed and will fax to PT. Called to update as well.    Marcos Ford RN

## 2020-02-10 NOTE — TELEPHONE ENCOUNTER
Yaneli left a VM on our back line to have OP PT orders faxed to [facility], where Pt will be doing OP PT after discharged from home care.     Orders printed and faxed.    Marcos Ford RN

## 2020-02-20 ENCOUNTER — TRANSFERRED RECORDS (OUTPATIENT)
Dept: HEALTH INFORMATION MANAGEMENT | Facility: CLINIC | Age: 60
End: 2020-02-20

## 2020-02-20 ENCOUNTER — OFFICE VISIT (OUTPATIENT)
Dept: ORTHOPEDICS | Facility: CLINIC | Age: 60
End: 2020-02-20
Payer: COMMERCIAL

## 2020-02-20 VITALS
HEART RATE: 81 BPM | OXYGEN SATURATION: 94 % | SYSTOLIC BLOOD PRESSURE: 134 MMHG | WEIGHT: 315 LBS | BODY MASS INDEX: 47.8 KG/M2 | DIASTOLIC BLOOD PRESSURE: 79 MMHG

## 2020-02-20 DIAGNOSIS — Z96.612 STATUS POST REPLACEMENT OF LEFT SHOULDER JOINT: Primary | ICD-10-CM

## 2020-02-20 PROCEDURE — 99024 POSTOP FOLLOW-UP VISIT: CPT | Performed by: ORTHOPAEDIC SURGERY

## 2020-02-20 RX ORDER — SODIUM PHOSPHATE,MONO-DIBASIC 19G-7G/118
1 ENEMA (ML) RECTAL DAILY
COMMUNITY

## 2020-02-20 ASSESSMENT — PAIN SCALES - GENERAL: PAINLEVEL: MODERATE PAIN (5)

## 2020-02-20 NOTE — NURSING NOTE
Foreign Pond's chief complaint for this visit includes:  Chief Complaint   Patient presents with     Left Shoulder - Total Joint Post-op     6 wks S/p Left total shoulder arthroplasty DOS: 01/07/2020     PCP: Uli Owens    Referring Provider:  No referring provider defined for this encounter.    /79 (BP Location: Right arm, Patient Position: Sitting, Cuff Size: Adult Regular)   Pulse 81   Wt (!) 153.3 kg (338 lb)   SpO2 94%   BMI 47.80 kg/m    Moderate Pain (5)     Do you need any medication refills at today's visit? No    Charlene Brady CMA

## 2020-02-20 NOTE — LETTER
2/20/2020         RE: Foreign Pond  [address] [address]        Dear Colleague,    Thank you for referring your patient, Foreign Pond, to the [facility]. Please see a copy of my visit note below.    CHIEF CONCERN: Status post left total shoulder arthroplasty  DATE OF SURGERY: 07/07/2020     HISTORY OF PRESENT ILLNESS: Foreign is an extremely pleasant 59 year-old left hand dominant male who is six weeks status post the above procedure.  He did not take any oxycodone following surgery and has only been taking Tylenol 1000 mg at bedtime and Aspirin 162 mg daily.  He also ices his shoulder at bedtime.  Pain today is a 4-5 on a scale of 1-10.  Upon waking waking this morning pain was an 8 on a scale of 1-10.  He has been wearing the sling 70% of the time. He has started going to a physical therapist in [place] and will be going 3 times a week.  Wants to discuss using essential oils on the incision and would like to know when he can do pool therapy and if an infrared sauna is safe at this time.     EXAM:  Pleasant adult male in [facility]. Seen with his wife.  Respirations even and unlabored.  Left upper extremity: Incision clean, dry, and intact. Distally neurovascularly intact without deficits. Shoulder range of motion is assisted FE to 135 and ER at side to 30     IMAGING: None new     ASSESSMENT:  1. Six weeks status post above procedure     PLAN:    Range of Motion: Continue passive and active assisted ROM of the shoulder per operative note.     Sling: Continue sling except for bathing/dressing/rehab    Pain medication: Reviewed plan to be off all narcotic pain medications. NSAIDs and Tylenol PRN    Follow up: in 4 weeks.  Will discontinue sling at that time and initiate AROM. Plan to advance to strengthening at 3 months         CHIEF CONCERN: Status post left total shoulder arthroplasty  DATE OF SURGERY: 01/07/2020    HISTORY OF PRESENT ILLNESS: Foreign is an extremely pleasant 59  year-old left hand dominant male who is six weeks status post the above procedure.  He notes he is doing PT and progressing well.    EXAM:  Pleasant adult male in NAD.  Respirations even and unlabored.  Left upper extremity: Incision clean, dry, and intact. Distally neurovascularly intact without deficits. Shoulder range of motion is assisted FE to 135 and ER at side to 30    IMAGING: None new    ASSESSMENT:  1. Six weeks status post above procedure    PLAN:    Range of Motion: Progress to active ROM of the shoulder and light strengthening per operative note. No IR stretching up the back until 3 months    Sling: Discontinue sling    Pain medication:  NSAIDs and Tylenol PRN    Follow up: in 6 weeks with new shoulder xrays           Again, thank you for allowing me to participate in the care of your patient.        Sincerely,        Zahida Ames MD

## 2020-02-20 NOTE — PROGRESS NOTES
CHIEF CONCERN: Status post left total shoulder arthroplasty  DATE OF SURGERY: 01/07/2020    HISTORY OF PRESENT ILLNESS: Foreign is an extremely pleasant 59 year-old left hand dominant male who is six weeks status post the above procedure.  He notes he is doing PT and progressing well.    EXAM:  Pleasant adult male in NAD.  Respirations even and unlabored.  Left upper extremity: Incision clean, dry, and intact. Distally neurovascularly intact without deficits. Shoulder range of motion is assisted FE to 135 and ER at side to 30    IMAGING: None new    ASSESSMENT:  1. Six weeks status post above procedure    PLAN:    Range of Motion: Progress to active ROM of the shoulder and light strengthening per operative note. No IR stretching up the back until 3 months    Sling: Discontinue sling    Pain medication:  NSAIDs and Tylenol PRN    Follow up: in 6 weeks with new shoulder xrays

## 2020-02-20 NOTE — PATIENT INSTRUCTIONS
Thanks for coming today.  [facility]  [address] [place] [address]    To schedule future appointments in Ortho Clinic, you may call [phone].    To schedule ordered imaging by your provider:   Call Central Imaging [phone]    To schedule an injection ordered by your provider:  Call [facility] Injection scheduling line: [phone]  OX MEDIAhart available online at:  [email or web address]    Please call if any further questions or concerns ([phone]).  Clinic hours 8 am to 5 pm.    Return to clinic (call) if symptoms worsen or fail to improve.

## 2020-02-20 NOTE — PROGRESS NOTES
CHIEF CONCERN: Status post left total shoulder arthroplasty  DATE OF SURGERY: 07/07/2020     HISTORY OF PRESENT ILLNESS: Foreign is an extremely pleasant 59 year-old left hand dominant male who is six weeks status post the above procedure.  He did not take any oxycodone following surgery and has only been taking Tylenol 1000 mg at bedtime and Aspirin 162 mg daily.  He also ices his shoulder at bedtime.  Pain today is a 4-5 on a scale of 1-10.  Upon waking waking this morning pain was an 8 on a scale of 1-10.  He has been wearing the sling 70% of the time. He has started going to a physical therapist in [place] and will be going 3 times a week.  Wants to discuss using essential oils on the incision and would like to know when he can do pool therapy and if an infrared sauna is safe at this time.     EXAM:  Pleasant adult male in [place]. Seen with his wife.  Respirations even and unlabored.  Left upper extremity: Incision clean, dry, and intact. Distally neurovascularly intact without deficits. Shoulder range of motion is assisted FE to 135 and ER at side to 30     IMAGING: None new     ASSESSMENT:  1. Six weeks status post above procedure     PLAN:    Range of Motion: Continue passive and active assisted ROM of the shoulder per operative note.     Sling: Continue sling except for bathing/dressing/rehab    Pain medication: Reviewed plan to be off all narcotic pain medications. NSAIDs and Tylenol PRN    Follow up: in 4 weeks.  Will discontinue sling at that time and initiate AROM. Plan to advance to strengthening at 3 months

## 2020-02-20 NOTE — NURSING NOTE
CHIEF CONCERN: Status post left total shoulder arthroplasty  DATE OF SURGERY: 07/07/2020     HISTORY OF PRESENT ILLNESS: Foreign is an extremely pleasant 59 year-old left hand dominant male who is six weeks status post the above procedure.  He did not take any oxycodone following surgery and has only been taking Tylenol 1000 mg every 8 hours and Aspiring 162 mg daily.  Typically pain is between a 5-7 on a scale of 1-10.  Pain today is an 8 on a scale of 1-10.  He states the incision looks good.  He has been wearing the sling 100% of the time with the exception of bathing and when doing PT exercises.  He has a physical therapist coming to he house twice a week.       EXAM:  Pleasant adult male in [place]. Seen with his wife.  Respirations even and unlabored.  Left upper extremity: Incision clean, dry, and intact. Distally neurovascularly intact without deficits. Shoulder range of motion is assisted FE to 135 and ER at side to 30     IMAGING: None new     ASSESSMENT:  1. Six weeks status post above procedure     PLAN:    Range of Motion: Continue passive and active assisted ROM of the shoulder per operative note.     Sling: Continue sling except for bathing/dressing/rehab    Pain medication: Reviewed plan to be off all narcotic pain medications. NSAIDs and Tylenol PRN    Follow up: in 4 weeks.  Will discontinue sling at that time and initiate AROM. Plan to advance to strengthening at 3 months

## 2020-03-12 ENCOUNTER — TELEPHONE (OUTPATIENT)
Dept: ORTHOPEDICS | Facility: CLINIC | Age: 60
End: 2020-03-12

## 2020-03-12 NOTE — TELEPHONE ENCOUNTER
[facility] Call Center    Phone Message    May a detailed message be left on voicemail: yes     Reason for Call: Other: Patient will fax over form for return back to work date, pt needs form to reflect a date of May as pt doesn't see Dr. Ames until April 9th and work has return date of April 1st. Form will need to be faxed to [phone] and pt would also like a copy mailed to address on file. please advise     Action Taken: Message routed to:  Adult Clinics: Orthopedics p [id number]

## 2020-03-12 NOTE — TELEPHONE ENCOUNTER
S/p Left total shoulder arthroplasty, sx: 1/7/20    Pt's next appt for 12-araceli wk f/u is on 4/7/20. OK to put down 4/8/20 for now. Can update forms when Pt is here for appt.     Marcos Ford RN

## 2020-03-17 NOTE — TELEPHONE ENCOUNTER
[facility] Call Center    Phone Message    May a detailed message be left on voicemail: yes     Reason for Call: Other: Needs Continuation of Disability Claim Form Completed      Action Taken: Message routed to:  Adult Clinics: Orthopedics p [id number]    Travel Screening: Negative

## 2020-03-19 ENCOUNTER — TRANSFERRED RECORDS (OUTPATIENT)
Dept: HEALTH INFORMATION MANAGEMENT | Facility: CLINIC | Age: 60
End: 2020-03-19

## 2020-03-24 NOTE — TELEPHONE ENCOUNTER
Called Pt back to discuss because he was requesting to be off work until middle or end of May. Pt is already 11 weeks post op. Typically, we don't need to do any restrictions for longer than 12 weeks post op.     Pt reported that he has not been doing hardly any strengthening with physical therapy right now and that there is no way he would be able to return to work in 1-2 weeks. I advised that typically, he should be much further along at this point and that I would have to get the physical therapy notes and gives these to Dr. Ames to review. Dr. Ames would then advise on what to do. Pt was agreeable with plan.    Called [facility] to get PT notes and was advised that they would be able to send the care plan, but the specific notes would need an DAHLIA. I advised that Dr. Ames is the provider that performed surgery and signed the PT order for which he is currently doing PT. This stands as the DAHLIA.  only stated that she has gotten in trouble in the past for releasing records and that she would talk with her manager or supervisor. I gave her our callback number and I will try again this afternoon.     Marcos Ford, RN

## 2020-03-25 NOTE — TELEPHONE ENCOUNTER
Discussed with Dr. Ames. OK to extending current paperwork until appt on 4/9/20 (only adds 8 days), as this was the original plan anyway. Dr. Ames needs to review PT notes, but she was ok with releasing Pt back to work with current function. To be determined at next OV.     Called [employer] (Pt's disability company) and discussed concerns about Pt not being completely disabled and only has restrictions. The rep explained that they don't have a partial vs complete criteria. Pt needs to be able to do all things listed on their job requirement form and if they cannot, then they classify them as being disabled. Rep also stated that Pt may work with his employer to find another job within the company that would be more appropriate based on his restrictions, but that the Pt would need to work that out with their employer.     Called Pt back to discuss, stating that Dr. Ames was ok with extending to just past current f/u appt, but that further restrictions would need to be discussed at next OV.  Pt also stated that he signed DAHLIA with PT place so we should be getting records soon.    Faxed in paperwork and placed copy for scanning.    Marcos Ford RN

## 2020-03-31 ENCOUNTER — TELEPHONE (OUTPATIENT)
Dept: ORTHOPEDICS | Facility: CLINIC | Age: 60
End: 2020-03-31

## 2020-03-31 NOTE — TELEPHONE ENCOUNTER
[facility] Call Center    Phone Message    May a detailed message be left on voicemail: no     Reason for Call: Other: Pt returning call to ortho, regarding 4.9.20 appt.  Pt asking to talk with dept.      Action Taken: Message routed to:  Adult Clinics: Orthopedics p [id number]    Travel Screening:

## 2020-04-01 NOTE — TELEPHONE ENCOUNTER
Returned call to patient regarding upcoming appointment scheduled with Dr. Ames on 4/9/2020.    Patient does not have access for video visit.  He is agreeable to a telephone visit at the same time as already scheduled appointment.

## 2020-04-09 ENCOUNTER — VIRTUAL VISIT (OUTPATIENT)
Dept: ORTHOPEDICS | Facility: CLINIC | Age: 60
End: 2020-04-09
Payer: COMMERCIAL

## 2020-04-09 DIAGNOSIS — Z96.612 STATUS POST REPLACEMENT OF LEFT SHOULDER JOINT: Primary | ICD-10-CM

## 2020-04-09 PROCEDURE — 99212 OFFICE O/P EST SF 10 MIN: CPT | Mod: TEL | Performed by: ORTHOPAEDIC SURGERY

## 2020-04-09 NOTE — PROGRESS NOTES
"Foreign Pond is a 59 year old male who is being evaluated via a billable telephone visit.      The patient has been notified of following:     \"This telephone visit will be conducted via a call between you and your physician/provider. We have found that certain health care needs can be provided without the need for a physical exam.  This service lets us provide the care you need with a short phone conversation.  If a prescription is necessary we can send it directly to your pharmacy.  If lab work is needed we can place an order for that and you can then stop by our lab to have the test done at a later time.    Telephone visits are billed at different rates depending on your insurance coverage. During this emergency period, for some insurers they may be billed the same as an in-person visit.  Please reach out to your insurance provider with any questions.    If during the course of the call the physician/provider feels a telephone visit is not appropriate, you will not be charged for this service.\"    Patient has given verbal consent for Telephone visit?  Yes    How would you like to obtain your AVS? Mail a copy    Foreign Pond complains of    Chief Complaint   Patient presents with     Surgical Followup     12 wks S/p Left total shoulder arthroplasty DOS: 01/07/2020       I have reviewed and updated the patient's Past Medical History, Social History, Family History and Medication List.    ALLERGIES  Lisinopril    CHIEF CONCERN: Status post left total shoulder arthroplasty  DATE OF SURGERY: 01/07/2020    HISTORY OF PRESENT ILLNESS: Foreign is an extremely pleasant 59 year-old left hand dominant male who is 3 months status post the above procedure.  PT notes were eventually sent as requested and scanned into Epic. Patient only just started doing strengthening about 2 weeks ago. He would therefore like to extend disability form (also scanned in). Currently off until 4/13/20. PT 3/25 notes report FE to 152 and ER to " 66.    IMAGING: None new    ASSESSMENT:  1. Three months status post above procedure    PLAN:    Activity:     Range of motion as tolerated    Strengthening progressing; focus on this to improve home and work functions      Work: Activities reviewed and note provided     Follow up: 5 weeks to update workability, in clinic with XR      Phone call duration: 12 minutes    Zahida Ames MD

## 2020-04-09 NOTE — PATIENT INSTRUCTIONS
Thanks for coming today.  Ortho/Sports Medicine Clinic  [address] [place] [address]    To schedule future appointments in Ortho Clinic, you may call [phone].    To schedule ordered imaging by your provider:   Call Central Imaging [phone]    To schedule an injection ordered by your provider:  Call Central Imaging Injection scheduling line: [phone]  Per[employer]hart available online at:  [email or web address]    Please call if any further questions or concerns ([phone]).  Clinic hours 8 am to 5 pm.    Return to clinic (call) if symptoms worsen or fail to improve.

## 2020-04-13 ENCOUNTER — TELEPHONE (OUTPATIENT)
Dept: ORTHOPEDICS | Facility: CLINIC | Age: 60
End: 2020-04-13

## 2020-04-13 NOTE — TELEPHONE ENCOUNTER
The patient called with questions on his disability paperwork. His paperwork was completed with return to work as of 6/1/20 and faxed to the given number. The patient also requested a copy mailed to his home address.

## 2020-05-14 ENCOUNTER — OFFICE VISIT (OUTPATIENT)
Dept: ORTHOPEDICS | Facility: CLINIC | Age: 60
End: 2020-05-14
Payer: COMMERCIAL

## 2020-05-14 ENCOUNTER — ANCILLARY PROCEDURE (OUTPATIENT)
Dept: GENERAL RADIOLOGY | Facility: CLINIC | Age: 60
End: 2020-05-14
Attending: ORTHOPAEDIC SURGERY
Payer: COMMERCIAL

## 2020-05-14 VITALS — DIASTOLIC BLOOD PRESSURE: 92 MMHG | SYSTOLIC BLOOD PRESSURE: 144 MMHG | HEART RATE: 68 BPM

## 2020-05-14 DIAGNOSIS — Z96.612 STATUS POST REPLACEMENT OF LEFT SHOULDER JOINT: ICD-10-CM

## 2020-05-14 DIAGNOSIS — Z96.612 STATUS POST REPLACEMENT OF LEFT SHOULDER JOINT: Primary | ICD-10-CM

## 2020-05-14 PROCEDURE — 73030 X-RAY EXAM OF SHOULDER: CPT | Mod: LT | Performed by: RADIOLOGY

## 2020-05-14 PROCEDURE — 99213 OFFICE O/P EST LOW 20 MIN: CPT | Performed by: ORTHOPAEDIC SURGERY

## 2020-05-14 NOTE — LETTER
"    5/14/2020         RE: Foreign Pond  [address] [address]        Dear Colleague,    Thank you for referring your patient, Foreign Pond, to the [facility]. Please see a copy of my visit note below.    Foreign Pond is a 59 year old male who is being evaluated via a billable telephone visit.      The patient has been notified of following:     \"This telephone visit will be conducted via a call between you and your physician/provider. We have found that certain health care needs can be provided without the need for a physical exam.  This service lets us provide the care you need with a short phone conversation.  If a prescription is necessary we can send it directly to your pharmacy.  If lab work is needed we can place an order for that and you can then stop by our lab to have the test done at a later time.    Telephone visits are billed at different rates depending on your insurance coverage. During this emergency period, for some insurers they may be billed the same as an in-person visit.  Please reach out to your insurance provider with any questions.    If during the course of the call the physician/provider feels a telephone visit is not appropriate, you will not be charged for this service.\"    Patient has given verbal consent for Telephone visit?  Yes    How would you like to obtain your AVS? Mail a copy    Foreign Pond complains of    Chief Complaint   Patient presents with     Follow Up      5 wk clinic f/u, 4 months S/p Left total shoulder arthroplasty,       I have reviewed and updated the patient's Past Medical History, Social History, Family History and Medication List.    ALLERGIES  Lisinopril    CHIEF CONCERN: Status post left total shoulder arthroplasty  DATE OF SURGERY: 01/07/2020    HISTORY OF PRESENT ILLNESS: Foreign is an extremely pleasant 59 year-old left hand dominant male who is 5 months status post the above procedure. He notes his motion and function " continue to improve.    EXAM:  Adult male in no acute distress. Articulates and communicates with normal affect.  Respirations even and unlabored  Focused upper extremity exam: Left shoulder active motion is FE to 155, ER at side to 65, and IR only to L5.    IMAGING:   Left shoulder XR demonstrates components in very good position. Joint concentrically reduced.     ASSESSMENT:  1. Five months status post above procedure    PLAN:    Range of Motion: Active ROM of the shoulder and strengthening. May do IR up the back as tolerated.    Pain medication: NSAIDs and Tylenol PRN    WORK: Patient to return with 50lb lifting restriction on June 1 (6 month postop point) as previously planned.     Follow up: in 6 months.  Discussed lifetime limitations of no repetitive lifting over 50 pounds, no heavy impact activities (ie. chopping wood, jackhammer, etc).      Zahida Ames MD    Again, thank you for allowing me to participate in the care of your patient.        Sincerely,        Zahida Ames MD

## 2020-05-14 NOTE — PATIENT INSTRUCTIONS
Thanks for coming today.  Ortho/Sports Medicine Clinic  [address] [place] [address]    To schedule future appointments in Ortho Clinic, you may call [phone].    To schedule ordered imaging by your provider:   Call Central Imaging [phone]    To schedule an injection ordered by your provider:  Call Central Imaging Injection scheduling line: [phone]  [email or web address] available online at:  [email or web address]    Please call if any further questions or concerns ([phone]).  Clinic hours 8 am to 5 pm.    Return to clinic (call) if symptoms worsen or fail to improve.

## 2020-05-14 NOTE — LETTER
Foreign Pond     1960  Encounter date/time:  05/14/20      Report of Workability    Physician:  Zahida Ames MD    Diagnosis:  Left shoulder surgery    Limitations:  No heavy repetitive impact activities (post-, jackhammer) with left shoulder.  No lift/carry more than 50 pounds left shoulder.    Return to work status: Return to work WITH limitations.    Follow-Up:   Return to clinic in 6 months.

## 2020-05-15 NOTE — PROGRESS NOTES
"Foreign Pond is a 59 year old male who is being evaluated via a billable telephone visit.      The patient has been notified of following:     \"This telephone visit will be conducted via a call between you and your physician/provider. We have found that certain health care needs can be provided without the need for a physical exam.  This service lets us provide the care you need with a short phone conversation.  If a prescription is necessary we can send it directly to your pharmacy.  If lab work is needed we can place an order for that and you can then stop by our lab to have the test done at a later time.    Telephone visits are billed at different rates depending on your insurance coverage. During this emergency period, for some insurers they may be billed the same as an in-person visit.  Please reach out to your insurance provider with any questions.    If during the course of the call the physician/provider feels a telephone visit is not appropriate, you will not be charged for this service.\"    Patient has given verbal consent for Telephone visit?  Yes    How would you like to obtain your AVS? Mail a copy    Foreign Pond complains of    Chief Complaint   Patient presents with     Follow Up      5 wk clinic f/u, 4 months S/p Left total shoulder arthroplasty,       I have reviewed and updated the patient's Past Medical History, Social History, Family History and Medication List.    ALLERGIES  Lisinopril    CHIEF CONCERN: Status post left total shoulder arthroplasty  DATE OF SURGERY: 01/07/2020    HISTORY OF PRESENT ILLNESS: Foreign is an extremely pleasant 59 year-old left hand dominant male who is 5 months status post the above procedure. He notes his motion and function continue to improve.    EXAM:  Adult male in no acute distress. Articulates and communicates with normal affect.  Respirations even and unlabored  Focused upper extremity exam: Left shoulder active motion is FE to 155, ER at side to 65, and " IR only to L5.    IMAGING:   Left shoulder XR demonstrates components in very good position. Joint concentrically reduced.     ASSESSMENT:  1. Five months status post above procedure    PLAN:    Range of Motion: Active ROM of the shoulder and strengthening. May do IR up the back as tolerated.    Pain medication: NSAIDs and Tylenol PRN    WORK: Patient to return with 50lb lifting restriction on June 1 (6 month postop point) as previously planned.     Follow up: in 6 months.  Discussed lifetime limitations of no repetitive lifting over 50 pounds, no heavy impact activities (ie. chopping wood, jackhammer, etc).      Zahida Ames MD

## 2020-07-08 ENCOUNTER — MEDICAL CORRESPONDENCE (OUTPATIENT)
Dept: HEALTH INFORMATION MANAGEMENT | Facility: CLINIC | Age: 60
End: 2020-07-08

## 2020-07-20 DIAGNOSIS — I10 BENIGN ESSENTIAL HYPERTENSION: ICD-10-CM

## 2020-07-20 DIAGNOSIS — R35.0 URINARY FREQUENCY: ICD-10-CM

## 2020-07-20 NOTE — LETTER
[facility]  [address] [address]  Phone: [phone]        July 23, 2020      Foreign Pond                                                                                                                                [address] [address]            Dear Mr. Pond,    We recently provided you with 30-day medication refills.  These medications require routine follow-up with your care provider.    Please schedule an office visit for follow up before this refill supply is out.    Thank you,        Joleen Garcia, MEG / lar        lar

## 2020-07-22 NOTE — TELEPHONE ENCOUNTER
"Requested Prescriptions   Pending Prescriptions Disp Refills     hydrochlorothiazide (HYDRODIURIL) 25 MG tablet [Pharmacy Med Name: HYDROCHLOROTHIAZIDE 25MG TAB] 90 tablet 3     Sig: TAKE 1 TABLET (25 MG) BY MOUTH DAILY       Diuretics (Including Combos) Protocol Failed - 7/20/2020  1:04 AM        Failed - Blood pressure under 140/90 in past 12 months     BP Readings from Last 3 Encounters:   05/14/20 (!) 144/92   02/20/20 134/79   01/23/20 106/78                 Passed - Recent (12 mo) or future (30 days) visit within the authorizing provider's specialty     Patient has had an office visit with the authorizing provider or a provider within the authorizing providers department within the previous 12 mos or has a future within next 30 days. See \"Patient Info\" tab in inbasket, or \"Choose Columns\" in Meds & Orders section of the refill encounter.              Passed - Medication is active on med list        Passed - Patient is age 18 or older        Passed - Normal serum creatinine on file in past 12 months     Recent Labs   Lab Test 12/20/19  1108   CR 0.83              Passed - Normal serum potassium on file in past 12 months     Recent Labs   Lab Test 01/07/20  0910   POTASSIUM 4.2                    Passed - Normal serum sodium on file in past 12 months     Recent Labs   Lab Test 12/20/19  1108                    losartan (COZAAR) 100 MG tablet [Pharmacy Med Name: LOSARTAN 100MG TABLET] 90 tablet 3     Sig: TAKE 1 TABLET (100 MG) BY MOUTH DAILY       Angiotensin-II Receptors Failed - 7/20/2020  1:04 AM        Failed - Last blood pressure under 140/90 in past 12 months     BP Readings from Last 3 Encounters:   05/14/20 (!) 144/92   02/20/20 134/79   01/23/20 106/78                 Passed - Recent (12 mo) or future (30 days) visit within the authorizing provider's specialty     Patient has had an office visit with the authorizing provider or a provider within the authorizing providers department within the " "previous 12 mos or has a future within next 30 days. See \"Patient Info\" tab in inbasket, or \"Choose Columns\" in Meds & Orders section of the refill encounter.              Passed - Medication is active on med list        Passed - Patient is age 18 or older        Passed - Normal serum creatinine on file in past 12 months     Recent Labs   Lab Test 12/20/19  1108   CR 0.83       Ok to refill medication if creatinine is low          Passed - Normal serum potassium on file in past 12 months     Recent Labs   Lab Test 01/07/20  0910   POTASSIUM 4.2                       tamsulosin (FLOMAX) 0.4 MG capsule [Pharmacy Med Name: TAMSULOSIN 0.4MG CAPSULE] 90 capsule 3     Sig: TAKE 1 CAPSULE BY MOUTH ONCE A DAY       Alpha Blockers Failed - 7/20/2020  1:04 AM        Failed - Blood pressure under 140/90 in past 12 months     BP Readings from Last 3 Encounters:   05/14/20 (!) 144/92   02/20/20 134/79   01/23/20 106/78                 Passed - Recent (12 mo) or future (30 days) visit within the authorizing provider's specialty     Patient has had an office visit with the authorizing provider or a provider within the authorizing providers department within the previous 12 mos or has a future within next 30 days. See \"Patient Info\" tab in inbasket, or \"Choose Columns\" in Meds & Orders section of the refill encounter.              Passed - Patient does not have Tadalafil, Vardenafil, or Sildenafil on their medication list        Passed - Medication is active on med list        Passed - Patient is 18 years of age or older             "

## 2020-07-23 RX ORDER — TAMSULOSIN HYDROCHLORIDE 0.4 MG/1
CAPSULE ORAL
Qty: 30 CAPSULE | Refills: 0 | Status: SHIPPED | OUTPATIENT
Start: 2020-07-23 | End: 2020-10-02

## 2020-07-23 RX ORDER — LOSARTAN POTASSIUM 100 MG/1
100 TABLET ORAL DAILY
Qty: 30 TABLET | Refills: 0 | Status: SHIPPED | OUTPATIENT
Start: 2020-07-23 | End: 2020-10-02

## 2020-07-23 RX ORDER — HYDROCHLOROTHIAZIDE 25 MG/1
25 TABLET ORAL DAILY
Qty: 30 TABLET | Refills: 0 | Status: SHIPPED | OUTPATIENT
Start: 2020-07-23 | End: 2020-10-02

## 2020-07-23 NOTE — TELEPHONE ENCOUNTER
Call patient.  He is due for a clinic visit with me.  Meds were refilled for 1 month.  Joleen Garcia, CNP

## 2020-10-02 ENCOUNTER — OFFICE VISIT (OUTPATIENT)
Dept: FAMILY MEDICINE | Facility: CLINIC | Age: 60
End: 2020-10-02
Payer: COMMERCIAL

## 2020-10-02 VITALS
HEART RATE: 86 BPM | TEMPERATURE: 97.3 F | DIASTOLIC BLOOD PRESSURE: 86 MMHG | BODY MASS INDEX: 45.1 KG/M2 | WEIGHT: 315 LBS | OXYGEN SATURATION: 96 % | HEIGHT: 70 IN | SYSTOLIC BLOOD PRESSURE: 136 MMHG

## 2020-10-02 DIAGNOSIS — K43.9 VENTRAL HERNIA WITHOUT OBSTRUCTION OR GANGRENE: ICD-10-CM

## 2020-10-02 DIAGNOSIS — R35.0 URINARY FREQUENCY: ICD-10-CM

## 2020-10-02 DIAGNOSIS — I10 BENIGN ESSENTIAL HYPERTENSION: Primary | ICD-10-CM

## 2020-10-02 DIAGNOSIS — Z12.5 SCREENING FOR PROSTATE CANCER: ICD-10-CM

## 2020-10-02 LAB
ANION GAP SERPL CALCULATED.3IONS-SCNC: 5 MMOL/L (ref 3–14)
BUN SERPL-MCNC: 18 MG/DL (ref 7–30)
CALCIUM SERPL-MCNC: 9.3 MG/DL (ref 8.5–10.1)
CHLORIDE SERPL-SCNC: 100 MMOL/L (ref 94–109)
CO2 SERPL-SCNC: 32 MMOL/L (ref 20–32)
CREAT SERPL-MCNC: 0.99 MG/DL (ref 0.66–1.25)
GFR SERPL CREATININE-BSD FRML MDRD: 84 ML/MIN/{1.73_M2}
GLUCOSE SERPL-MCNC: 139 MG/DL (ref 70–99)
POTASSIUM SERPL-SCNC: 3.6 MMOL/L (ref 3.4–5.3)
PSA SERPL-ACNC: 0.64 UG/L (ref 0–4)
SODIUM SERPL-SCNC: 137 MMOL/L (ref 133–144)

## 2020-10-02 PROCEDURE — 80048 BASIC METABOLIC PNL TOTAL CA: CPT | Performed by: NURSE PRACTITIONER

## 2020-10-02 PROCEDURE — G0103 PSA SCREENING: HCPCS | Performed by: NURSE PRACTITIONER

## 2020-10-02 PROCEDURE — 99214 OFFICE O/P EST MOD 30 MIN: CPT | Performed by: NURSE PRACTITIONER

## 2020-10-02 PROCEDURE — 36415 COLL VENOUS BLD VENIPUNCTURE: CPT | Performed by: NURSE PRACTITIONER

## 2020-10-02 RX ORDER — ASCORBIC ACID 100 MG
TABLET,CHEWABLE ORAL DAILY
COMMUNITY

## 2020-10-02 RX ORDER — TAMSULOSIN HYDROCHLORIDE 0.4 MG/1
CAPSULE ORAL
Qty: 90 CAPSULE | Refills: 3 | Status: SHIPPED | OUTPATIENT
Start: 2020-10-02 | End: 2021-07-09

## 2020-10-02 RX ORDER — LOSARTAN POTASSIUM 100 MG/1
100 TABLET ORAL DAILY
Qty: 90 TABLET | Refills: 3 | Status: SHIPPED | OUTPATIENT
Start: 2020-10-02 | End: 2021-07-09

## 2020-10-02 RX ORDER — HYDROCHLOROTHIAZIDE 25 MG/1
25 TABLET ORAL DAILY
Qty: 90 TABLET | Refills: 3 | Status: SHIPPED | OUTPATIENT
Start: 2020-10-02 | End: 2021-07-09

## 2020-10-02 RX ORDER — FAMOTIDINE 20 MG
TABLET ORAL
COMMUNITY

## 2020-10-02 ASSESSMENT — MIFFLIN-ST. JEOR: SCORE: 2445.59

## 2020-10-02 NOTE — LETTER
October 2, 2020      Foreign Pond  [address] [place] [address]        Dear ,    We are writing to inform you of your test results.    PSA was normal.   Kidney function and electrolytes are normal.     Resulted Orders   Basic metabolic panel  (Ca, Cl, CO2, Creat, Gluc, K, Na, BUN)   Result Value Ref Range    Sodium 137 133 - 144 mmol/L    Potassium 3.6 3.4 - 5.3 mmol/L    Chloride 100 94 - 109 mmol/L    Carbon Dioxide 32 20 - 32 mmol/L    Anion Gap 5 3 - 14 mmol/L    Glucose 139 (H) 70 - 99 mg/dL    Urea Nitrogen 18 7 - 30 mg/dL    Creatinine 0.99 0.66 - 1.25 mg/dL    GFR Estimate 84 >60 mL/min/[1.73_m2]      Comment:      Non  GFR Calc  Starting 12/18/2018, serum creatinine based estimated GFR (eGFR) will be   calculated using the Chronic Kidney Disease Epidemiology Collaboration   (CKD-EPI) equation.      GFR Estimate If Black >90 >60 mL/min/[1.73_m2]      Comment:       GFR Calc  Starting 12/18/2018, serum creatinine based estimated GFR (eGFR) will be   calculated using the Chronic Kidney Disease Epidemiology Collaboration   (CKD-EPI) equation.      Calcium 9.3 8.5 - 10.1 mg/dL   PSA, screen   Result Value Ref Range    PSA 0.64 0 - 4 ug/L      Comment:      Assay Method:  Chemiluminescence using Siemens Vista analyzer       If you have any questions or concerns, please call the clinic at the number listed above.       Sincerely,        RAVEN Graham CNP

## 2020-10-02 NOTE — PROGRESS NOTES
"Subjective     Foreign Pond is a 60 year old male who presents to clinic today for the following health issues:    HPI         Concern - lump in abdomen  Onset: couple weeks  Description: patient has noticed a bulging in upper abdomen, above naval  Wife noticed it  Asymptomatic   Intensity: mild  Progression of Symptoms:  same  Accompanying Signs & Symptoms: denies pain  Previous history of similar problem: no  Precipitating factors:        Worsened by: unknown  Alleviating factors:        Improved by: nothing  Therapies tried and outcome:  none       Hypertension Follow-up      Do you check your blood pressure regularly outside of the clinic? No     Are you following a low salt diet? Yes    Are your blood pressures ever more than 140 on the top number (systolic) OR more   than 90 on the bottom number (diastolic), for example 140/90? No      Urinary frequency:  Well controlled with tamsulosin  No side effects.      Review of Systems   Constitutional, HEENT, cardiovascular, pulmonary, gi and gu systems are negative, except as otherwise noted.      Objective    /86 (BP Location: Right arm, Cuff Size: Adult Large)   Pulse 86   Temp 97.3  F (36.3  C) (Tympanic)   Ht 1.778 m (5' 10\")   Wt (!) 161.9 kg (357 lb)   SpO2 96%   BMI 51.22 kg/m    Body mass index is 51.22 kg/m .  Physical Exam   GENERAL: healthy, alert and no distress  RESP: lungs clear to auscultation - no rales, rhonchi or wheezes  CV: regular rate and rhythm, normal S1 S2, no S3 or S4, no murmur, click or rub, no peripheral edema and peripheral pulses strong  ABDOMEN: Central obesity. Soft, nontender. Large, midline hernia noted - non tender to palpation, no palpation of any bowel protuberance within the defect.             Assessment & Plan     Benign essential hypertension  Well controlled.  - hydrochlorothiazide (HYDRODIURIL) 25 MG tablet; Take 1 tablet (25 mg) by mouth daily  - losartan (COZAAR) 100 MG tablet; Take 1 tablet (100 mg) by " "mouth daily  - Basic metabolic panel  (Ca, Cl, CO2, Creat, Gluc, K, Na, BUN)    Urinary frequency  Well controlled  - tamsulosin (FLOMAX) 0.4 MG capsule; TAKE 1 CAPSULE BY MOUTH ONCE A DAY    Ventral hernia without obstruction or gangrene  Asymptomatic.  Recommended weight loss and monitoring.    Screening for prostate cancer  - PSA, screen     BMI:   Estimated body mass index is 51.22 kg/m  as calculated from the following:    Height as of this encounter: 1.778 m (5' 10\").    Weight as of this encounter: 161.9 kg (357 lb).   Weight management plan: Discussed healthy diet and exercise guidelines             Return in about 1 year (around 10/2/2021).    The risks, benefits and treatment options of prescribed medications or other treatments have been discussed with the patient. The patient verbalized their understanding and should call or follow up if no improvement or if they develop further problems.    RAVEN Graham [facility]    "

## 2020-11-19 ENCOUNTER — TELEPHONE (OUTPATIENT)
Dept: FAMILY MEDICINE | Facility: CLINIC | Age: 60
End: 2020-11-19

## 2020-11-19 NOTE — TELEPHONE ENCOUNTER
Routed to provider pool for second level triage since these symptoms require virtual visit and given that his symptoms started two days ago, he should be evaluated today for shingles outbreak.    Caro Edwards RN

## 2020-11-19 NOTE — TELEPHONE ENCOUNTER
I have a couple of 40 minute appointments this afternoon that may not need that full time.  You can book him as a video appointment in the second half of one of those slots.    Thanks,  Juanjo Pascual MD

## 2020-11-19 NOTE — TELEPHONE ENCOUNTER
Left non-detailed message for patient to return a call to the clinic RN.   JOSE ENRIQUE Edwards RN

## 2020-11-20 NOTE — TELEPHONE ENCOUNTER
Spoke to the patient, he was seen at [facility] for this, he received valtrex for this, patient had just lost his father and is going to have a . The patient had questions on shingles, reviewed information and that is contagious till crusted over, the patient was told to call back if has concerns with pain, the patient says he is not having pain at this time.    EKATERINA Hodge

## 2020-11-20 NOTE — TELEPHONE ENCOUNTER
Looks like no one else has openings today, so I can see him at some point.    Thanks,  Juanjo Pascual MD

## 2021-03-31 NOTE — PROGRESS NOTES
[address] [address]  [phone]  Dept: [phone]    PRE-OP EVALUATION:  Today's date: 2019    [place] (: 1960) presents for pre-operative evaluation assessment as requested by Dr. Zahida Ames.  He requires evaluation and anesthesia risk assessment prior to undergoing surgery/procedure for treatment of left shoulder pain.    Proposed Surgery/ Procedure: Left total shoulder arthroplasty  Date of Surgery/ Procedure: 20  Time of Surgery/ Procedure: 2:30 pm  Hospital/Surgical Facility: [facility]  Fax number for surgical facility: available electronically  Primary Physician: Uli Owens  Type of Anesthesia Anticipated: to be determined    Patient has a Health Care Directive or Living Will:  NO    Flu Vaccine - offered, patient declined.    1. NO - Do you have a history of heart attack, stroke, stent, bypass or surgery on an artery in the head, neck, heart or legs?  2. NO - Do you ever have any pain or discomfort in your chest?  3. NO - Do you have a history of  Heart Failure?  4. NO - Are you troubled by shortness of breath when: walking on the level, up a slight hill or at night?  5. NO - Do you currently have a cold, bronchitis or other respiratory infection?  6. NO - Do you have a cough, shortness of breath or wheezing?  7. NO - Do you sometimes get pains in the calves of your legs when you walk?  8. NO - Do you or anyone in your family have previous history of blood clots?  9. NO - Do you or does anyone in your family have a serious bleeding problem such as prolonged bleeding following surgeries or cuts?  10. NO - Have you ever had problems with anemia or been told to take iron pills?  11. NO - Have you had any abnormal blood loss such as black, tarry or bloody stools, or abnormal vaginal bleeding?  12. NO - Have you ever had a blood transfusion?  13. NO - Have you or any of your  relatives ever had problems with anesthesia?  14. YES - DO YOU HAVE SLEEP APNEA, EXCESSIVE SNORING OR DAYTIME DROWSINESS?  Snoring  15. NO - Do you have any prosthetic heart valves?  16. NO - Do you have prosthetic joints?  17. NO - Is there any chance that you may be pregnant?      HPI:     HPI related to upcoming procedure: Patient with past medical history significant for chronic left shoulder pain from left osteoarthritis shoulder, hypertension, morbid obesity is here for preop clearance for his upcoming left shoulder arthroplasty      HYPERTENSION - Patient has longstanding history of HTN , currently denies any symptoms referable to elevated blood pressure. Specifically denies chest pain, palpitations, dyspnea, orthopnea, PND or peripheral edema. Blood pressure readings have been in normal range. Current medication regimen is as listed below. Patient denies any side effects of medication.       MEDICAL HISTORY:     Patient Active Problem List    Diagnosis Date Noted     Incomplete RBBB 12/20/2019     Priority: Medium     Snoring 12/20/2019     Priority: Medium     Arthritis of shoulder region, left 10/28/2019     Priority: Medium     Added automatically from request for surgery [id number]       Morbid obesity with BMI of 40.0-44.9, adult (H) 08/17/2018     Priority: Medium     CARDIOVASCULAR SCREENING; LDL GOAL LESS THAN 160 08/05/2018     Priority: Medium     Testicular hypofunction 04/04/2014     Priority: Medium     Problem list name updated by automated process. Provider to review       Urinary frequency 05/18/2011     Priority: Medium     Benign essential hypertension 05/18/2011     Priority: Medium     Generalized anxiety disorder 05/13/2006     Priority: Medium     Currently having intermittent panic attacks.  Chose to discontinue Lexapro as he did not feel it was working and did not want to increase the dose.  He is not wanting medication for this now.       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  Doesn't use albuterol ever.        Past Surgical History:   Procedure Laterality Date     COLONOSCOPY  7/2006    repeat in 10 years     COLONOSCOPY N/A 12/12/2016    Procedure: COLONOSCOPY;  Surgeon: Delonte Miller MD;  Location: [facility]     CREATE EARDRUM OPENING,LOCAL ANESTH  age 15     HC ECP WITH CATARACT SURGERY       LAPAROSCOPIC CHOLECYSTECTOMY  11/8/2012    Procedure: LAPAROSCOPIC CHOLECYSTECTOMY;  Laparoscopic Cholecystectomy & repair of umbilical hernia;  Surgeon: Delonte Miller MD;  Location: [facility]     LASIK BILATERAL  2011     VASECTOMY Bilateral 2/21/2018    Procedure: VASECTOMY;  Bilateral vasectomy;  Surgeon: Kavon Everett MD;  Location: [facility]     Current Outpatient Medications   Medication Sig Dispense Refill     aspirin 81 MG tablet Take 1 tablet by mouth daily. 1 tablet 3     diclofenac (VOLTAREN) 1 % topical gel Place 2 g onto the skin 4 times daily 100 g 0     hydrochlorothiazide (HYDRODIURIL) 25 MG tablet Take 1 tablet (25 mg) by mouth daily 90 tablet 3     losartan (COZAAR) 100 MG tablet Take 1 tablet (100 mg) by mouth daily 90 tablet 3     tamsulosin (FLOMAX) 0.4 MG capsule TAKE 1 CAPSULE BY MOUTH ONCE A DAY 90 capsule 3     UNABLE TO FIND Take 5 tablets by mouth 2 times daily \"Isogenic vitamins\"       OTC products: Aspirin and omega-3 fish oil and multivitamins    Allergies   Allergen Reactions     Lisinopril Cough      Latex Allergy: NO    Social History     Tobacco Use     Smoking status: Never Smoker     Smokeless tobacco: Never Used   Substance Use Topics     Alcohol use: No     History   Drug Use No       REVIEW OF SYSTEMS:   CONSTITUTIONAL: NEGATIVE for fever, chills, change in weight  INTEGUMENTARY/SKIN: " "NEGATIVE for worrisome rashes, moles or lesions  EYES: NEGATIVE for vision changes or irritation  ENT/MOUTH: NEGATIVE for ear, mouth and throat problems  RESP: NEGATIVE for significant cough or SOB  BREAST: NEGATIVE for masses, tenderness or discharge  CV: NEGATIVE for chest pain, palpitations or peripheral edema  CV: Hx HTN  GI: NEGATIVE for nausea, abdominal pain, heartburn, or change in bowel habits  : NEGATIVE for frequency, dysuria, or hematuria  MUSCULOSKELETAL: Chronic left shoulder pain from osteoarthritis  NEURO: NEGATIVE for weakness, dizziness or paresthesias  ENDOCRINE: NEGATIVE for temperature intolerance, skin/hair changes  HEME: NEGATIVE for bleeding problems  PSYCHIATRIC: NEGATIVE for changes in mood or affect    EXAM:   /84   Pulse 77   Temp 97.9  F (36.6  C) (Oral)   Ht 1.791 m (5' 10.5\")   Wt (!) 155.3 kg (342 lb 4.8 oz)   SpO2 96%   BMI 48.42 kg/m      GENERAL APPEARANCE: healthy, alert and no distress    GENERAL APPEARANCE: Morbidly obese     EYES: EOMI,  PERRL     HENT: ear canals and TM's normal and nose and mouth without ulcers or lesions     NECK: no adenopathy, no asymmetry, masses, or scars and thyroid normal to palpation     RESP: lungs clear to auscultation - no rales, rhonchi or wheezes     CV: regular rates and rhythm, normal S1 S2, no S3 or S4 and no murmur, click or rub     ABDOMEN:  soft, nontender, no HSM or masses and bowel sounds normal     MS: extremities normal- no gross deformities noted, no evidence of inflammation in joints, FROM in all extremities.     SKIN: no suspicious lesions or rashes     NEURO: Normal strength and tone, sensory exam grossly normal, mentation intact and speech normal     PSYCH: mentation appears normal. and affect normal/bright     LYMPHATICS: No cervical adenopathy    DIAGNOSTICS:   EKG: appears normal, NSR, incomplete right  bundle Branch Block, unchanged from previous tracings  Results for orders placed or performed in visit on " 12/20/19   CBC with platelets and differential     Status: None   Result Value Ref Range    WBC 6.8 4.0 - 11.0 10e9/L    RBC Count 5.10 4.4 - 5.9 10e12/L    Hemoglobin 14.3 13.3 - 17.7 g/dL    Hematocrit 43.1 40.0 - 53.0 %    MCV 85 78 - 100 fl    MCH 28.0 26.5 - 33.0 pg    MCHC 33.2 31.5 - 36.5 g/dL    RDW 13.2 10.0 - 15.0 %    Platelet Count 220 150 - 450 10e9/L    Diff Method Automated Method     % Neutrophils 70.3 %    % Lymphocytes 18.6 %    % Monocytes 6.9 %    % Eosinophils 3.1 %    % Basophils 0.7 %    % Immature Granulocytes 0.4 %    Absolute Neutrophil 4.8 1.6 - 8.3 10e9/L    Absolute Lymphocytes 1.3 0.8 - 5.3 10e9/L    Absolute Monocytes 0.5 0.0 - 1.3 10e9/L    Absolute Eosinophils 0.2 0.0 - 0.7 10e9/L    Absolute Basophils 0.1 0.0 - 0.2 10e9/L    Abs Immature Granulocytes 0.0 0 - 0.4 10e9/L   Basic metabolic panel  (Ca, Cl, CO2, Creat, Gluc, K, Na, BUN)     Status: Abnormal   Result Value Ref Range    Sodium 140 133 - 144 mmol/L    Potassium 3.5 3.4 - 5.3 mmol/L    Chloride 106 94 - 109 mmol/L    Carbon Dioxide 30 20 - 32 mmol/L    Anion Gap 4 3 - 14 mmol/L    Glucose 118 (H) 70 - 99 mg/dL    Urea Nitrogen 22 7 - 30 mg/dL    Creatinine 0.83 0.66 - 1.25 mg/dL    GFR Estimate >90 >60 mL/min/[1.73_m2]    GFR Estimate If Black >90 >60 mL/min/[1.73_m2]    Calcium 9.2 8.5 - 10.1 mg/dL         Recent Labs   Lab Test 09/14/19  0825 08/17/18  0913 10/17/17  1140   HGB  --  15.2 15.5   PLT  --  213 236    141 137   POTASSIUM 3.8 3.5 3.4   CR 0.84 0.95 1.02        IMPRESSION:   Reason for surgery/procedure: Chronic left shoulder pain from left shoulder osteoarthritis/left shoulder total arthroplasty  Diagnosis/reason for consult: Preop clearance    ASSESSMENT / PLAN:  (Z01.818) Preop general physical exam  (primary encounter diagnosis)  Comment:   Plan: EKG 12-lead complete w/read - Clinics, CBC with        platelets and differential, Basic metabolic         panel  (Ca, Cl, CO2, Creat, Gluc, K, Na, BUN)         Patient is cleared for the procedure  Recommended to hold baby aspirin and omega-3 fish oil for at least 7 to 10 days before the procedure  Will hold hydrochlorothiazide on the morning of the procedure    (M19.012) Arthritis of shoulder region, left  Comment:   Plan: EKG 12-lead complete w/read - Clinics, CBC with        platelets and differential, Basic metabolic         panel  (Ca, Cl, CO2, Creat, Gluc, K, Na, BUN)        as above    (R06.83) Snoring  Comment:   Plan: SLEEP EVALUATION & MANAGEMENT REFERRAL - [facility]          [phone] (Age 15 and up)        Patient has a chronic history of loud snoring.  He never had a sleep study done in the past.  Given his morbid obesity and snoring, recommended to get a sleep study done to evaluate for sleep apnea, though this is not going to prevent him from getting the surgery done  Patient verbalised understanding and is agreeable to the plan.      (E66.01,  Z68.41) Morbid obesity with BMI of 40.0-44.9, adult (H)  Comment:   Plan: SLEEP EVALUATION & MANAGEMENT REFERRAL - [facility]          [phone] (Age 15 and up)          Wt Readings from Last 5 Encounters:   12/20/19 (!) 155.3 kg (342 lb 4.8 oz)   10/28/19 (!) 152.4 kg (335 lb 14.4 oz)   10/17/19 (!) 153.3 kg (338 lb)   07/26/19 (!) 153.3 kg (338 lb)   08/17/18 137.6 kg (303 lb 6.4 oz)     Emphasized on weight loss, portion control, low calorie and low fat diet, healthy eating, regular exercises. Offered dietary consult, declined. Encouraged to enroll in a weight loss program for tracking on meal planning and weight.      (I10) Benign essential hypertension  Comment:   Plan:   BP Readings from Last 6 Encounters:   12/20/19 137/84   10/28/19 (!) 140/94   07/26/19 150/90   08/17/18 136/84   08/01/18 137/86   03/05/18 122/77     Initial blood pressure was 149/84, rechecked-137/84  Recommended to start taking hydrochlorothiazide  25 mg in the morning and taking Zoloft 100 mg in the evening instead of both at the same time    (R35.0) Urinary frequency  Comment:   Plan: On Flomax 0.4 mg daily    (I45.10) Incomplete RBBB  Comment:   Plan: Compared with a EKG from 2017 showing the same findings.  Patient has not had an echo since 2003  Patient never had cardiac symptoms in the past 10+ years  We will monitor for now, emphasized on weight loss, optimal blood pressure control        The proposed surgical procedure is considered INTERMEDIATE risk.    REVISED CARDIAC RISK INDEX  The patient has the following serious cardiovascular risks for perioperative complications such as (MI, PE, VFib and 3  AV Block):  No serious cardiac risks  INTERPRETATION: 1 risks: Class II (low risk - 0.9% complication rate)    The patient has the following additional risks for perioperative complications:  Morbid obesity      ICD-10-CM    1. Preop general physical exam Z01.818 EKG 12-lead complete w/read - Clinics     CBC with platelets and differential     Basic metabolic panel  (Ca, Cl, CO2, Creat, Gluc, K, Na, BUN)   2. Arthritis of shoulder region, left M19.012 EKG 12-lead complete w/read - Clinics     CBC with platelets and differential     Basic metabolic panel  (Ca, Cl, CO2, Creat, Gluc, K, Na, BUN)   3. Snoring R06.83 SLEEP EVALUATION & MANAGEMENT REFERRAL - [facility]  [phone] (Age 15 and up)   4. Morbid obesity with BMI of 40.0-44.9, adult (H) E66.01 SLEEP EVALUATION & MANAGEMENT REFERRAL - [facility]  [phone] (Age 15 and up)    Z68.41    5. Benign essential hypertension I10    6. Urinary frequency R35.0    7. Incomplete RBBB I45.10        RECOMMENDATIONS:       Obstructive Sleep Apnea (or suspected sleep apnea)  Hospital staff are advised to monitor for sleep related oxygen desaturations due to suspicion of JAZMYNE      --Patient is to take all scheduled medications on the day of  surgery EXCEPT for modifications listed below.    Anticoagulant or Antiplatelet Medication Use  ASPIRIN: Discontinue ASA 7-10 days prior to procedure to reduce bleeding risk.  It should be resumed post-operatively.        APPROVAL GIVEN to proceed with proposed procedure, without further diagnostic evaluation       Signed Electronically by: Uli Owens MD    Copy of this evaluation report is provided to requesting physician.    [facility] Preop Guidelines    Revised Cardiac Risk Index  Chart documentation done in part with Dragon Voice recognition Software. Although reviewed after completion, some word and grammatical error may remain.     Sski Pregnancy And Lactation Text: This medication is Pregnancy Category D and isn't considered safe during pregnancy. It is excreted in breast milk.

## 2021-04-06 ENCOUNTER — TELEPHONE (OUTPATIENT)
Dept: FAMILY MEDICINE | Facility: CLINIC | Age: 61
End: 2021-04-06

## 2021-04-06 NOTE — TELEPHONE ENCOUNTER
Reason for call:    Symptom or request:     Patient called stating that he was diagnosis with covid 04/01/2021. And he tested positive however unsure of recommendations and protocols.     Reports sx as: joint pains, eyes, breathing--denies SOB currently; HA      Best Time:  any    Can we leave a detailed message on this number?  YES     Marsha QUISPE  Station

## 2021-04-06 NOTE — TELEPHONE ENCOUNTER
RN returned call. Patient denies severe symptoms such as breathing problems or chest pain.  Both he and spouse have COVID-19.   RN reviewed home care measures such as adequate fluids, rest, nutrition.  Reviewed isolation measures and duration per CDC guidelines, as well as symptoms to watch for and those that would necessitate emergency care. Understanding voiced.    He will reach out as needed with questions/concerns and was instructed to schedule a virtual visit as needed should symptoms worsen or fail to improve.     Juanita Rader RN  [facility]

## 2021-04-10 ENCOUNTER — NURSE TRIAGE (OUTPATIENT)
Dept: NURSING | Facility: CLINIC | Age: 61
End: 2021-04-10

## 2021-04-10 NOTE — TELEPHONE ENCOUNTER
Patient says he wants to be seen because he still feels tired every day since the start of his symptoms from April 1st.    Patient reports fatigue and body and head aches. Patient report pain level is about 5/10 and pain level has been the same since the start of symptoms but doesn't last all day as it did previously.    Reviewed care advice with caller per RN triage protocol guideline. Advised home care and message can be routed to his provider if he wants to be retested or he can schedule virtual follow-up.  Caller verbalized understanding and declines follow-up to be scheduled or message to be sent.                Reason for Disposition    [1] COVID-19 diagnosed by positive lab test AND [2] mild symptoms (e.g., cough, fever, others) AND [3] no complications or SOB    Additional Information    Negative: SEVERE difficulty breathing (e.g., struggling for each breath, speaks in single words)    Negative: Difficult to awaken or acting confused (e.g., disoriented, slurred speech)    Negative: Bluish (or gray) lips or face now    Negative: Shock suspected (e.g., cold/pale/clammy skin, too weak to stand, low BP, rapid pulse)    Negative: Sounds like a life-threatening emergency to the triager    Negative: SEVERE or constant chest pain or pressure (Exception: mild central chest pain, present only when coughing)    Negative: MODERATE difficulty breathing (e.g., speaks in phrases, SOB even at rest, pulse 100-120)    Negative: [1] Headache AND [2] stiff neck (can't touch chin to chest)    Negative: MILD difficulty breathing (e.g., minimal/no SOB at rest, SOB with walking, pulse <100)    Negative: Chest pain or pressure    Negative: Patient sounds very sick or weak to the triager    Negative: Fever > 103 F (39.4 C)    Negative: [1] Fever > 101 F (38.3 C) AND [2] age > 60    Negative: [1] Fever > 100.0 F (37.8 C) AND [2] bedridden (e.g., nursing home patient, CVA, chronic illness, recovering from surgery)    Negative: [1] HIGH  RISK patient (e.g., age > 64 years, diabetes, heart or lung disease, weak immune system) AND [2] new or worsening symptoms    Negative: [1] HIGH RISK patient AND [2] influenza is widespread in the community AND [3] ONE OR MORE respiratory symptoms: cough, sore throat, runny or stuffy nose    Negative: [1] HIGH RISK patient AND [2] influenza exposure within the last 7 days AND [3] ONE OR MORE respiratory symptoms: cough, sore throat, runny or stuffy nose    Negative: Fever present > 3 days (72 hours)    Negative: [1] Fever returns after gone for over 24 hours AND [2] symptoms worse or not improved    Negative: [1] Continuous (nonstop) coughing interferes with work or school AND [2] no improvement using cough treatment per protocol    Negative: [1] COVID-19 infection suspected by caller or triager AND [2] mild symptoms (cough, fever, or others) AND [3] no complications or SOB    Negative: Cough present > 3 weeks    Protocols used: CORONAVIRUS (COVID-19) DIAGNOSED OR [id number] 1.3

## 2021-04-14 NOTE — TELEPHONE ENCOUNTER
Call to pt  Advise per Dr Beck note.  Verb understanding.    The patient was contacted today to discuss his left shoulder MRI.  Dr. Boss is recommending a consultation with Dr. Ames. The patient is agreeable and an appointment was offer and scheduled.

## 2021-07-08 NOTE — PROGRESS NOTES
SUBJECTIVE:   CC: [place] is an 61 year old male who presents for preventative health visit.       Patient has been advised of split billing requirements and indicates understanding: Yes  Healthy Habits:    Getting at least 3 servings of Calcium per day:  Yes    Bi-annual eye exam:  Yes    Dental care twice a year:  Yes    Sleep apnea or symptoms of sleep apnea:  Excessive snoring    Diet:  Other (low carb/ low red meat)    Frequency of exercise:  2-3 days/week    Duration of exercise:  30-45 minutes    Taking medications regularly:  Yes    Barriers to taking medications:  None    Medication side effects:  None    PHQ-2 Total Score:    Additional concerns today:  Yes        PROBLEMS TO ADD ON...    Feet turn purple on and off  Concerned about circulation issue  Started recently -  After effect from covid?        Medication Followup of flomax-   Does he still need to take this    Taking Medication as prescribed: yes    Side Effects:  None    Medication Helping Symptoms:  Yes, but he wasn't sure why he was taking it.     Hypertension Follow-up      Do you check your blood pressure regularly outside of the clinic? Yes  Once in a while    Are you following a low salt diet? Yes    Are your blood pressures ever more than 140 on the top number (systolic) OR more   than 90 on the bottom number (diastolic), for example 140/90? Yes   When he was sick with covid      Today's PHQ-2 Score:   PHQ-2 ( 1999 Pfizer) 7/9/2021   Q1: Little interest or pleasure in doing things 0   Q2: Feeling down, depressed or hopeless 0   PHQ-2 Score 0       Abuse: Current or Past(Physical, Sexual or Emotional)- No  Do you feel safe in your environment? Yes    Have you ever done Advance Care Planning? (For example, a Health Directive, POLST, or a discussion with a medical provider or your loved ones about your wishes): No, advance care planning information given to patient to review.  Advanced care planning was discussed at today's  "visit.    Social History     Tobacco Use     Smoking status: Never Smoker     Smokeless tobacco: Never Used   Substance Use Topics     Alcohol use: No     If you drink alcohol do you typically have >3 drinks per day or >7 drinks per week? Not applicable    Alcohol Use 5/12/2017   Prescreen: >3 drinks/day or >7 drinks/week? The patient does not drink >3 drinks per day nor >7 drinks per week.       Last PSA:   PSA   Date Value Ref Range Status   10/02/2020 0.64 0 - 4 ug/L Final     Comment:     Assay Method:  Chemiluminescence using Siemens Vista analyzer       Reviewed orders with patient. Reviewed health maintenance and updated orders accordingly - Yes      Reviewed and updated as needed this visit by clinical staff  Tobacco  Allergies  Meds   Med Hx  Surg Hx  Fam Hx  Soc Hx        Reviewed and updated as needed this visit by Provider                    Review of Systems  CONSTITUTIONAL: NEGATIVE for fever, chills, change in weight  INTEGUMENTARY/SKIN: NEGATIVE for worrisome rashes, moles or lesions  EYES: NEGATIVE for vision changes or irritation  ENT: NEGATIVE for ear, mouth and throat problems  RESP: NEGATIVE for significant cough or SOB  CV: NEGATIVE for chest pain, palpitations or peripheral edema  GI: NEGATIVE for nausea, abdominal pain, heartburn, or change in bowel habits   male: negative for dysuria, hematuria, decreased urinary stream, erectile dysfunction, urethral discharge  MUSCULOSKELETAL: NEGATIVE for significant arthralgias or myalgia  NEURO: NEGATIVE for weakness, dizziness or paresthesias  PSYCHIATRIC: NEGATIVE for changes in mood or affect    OBJECTIVE:   /86 (BP Location: Right arm, Cuff Size: Adult Large)   Pulse 70   Temp 97.5  F (36.4  C) (Tympanic)   Ht 1.778 m (5' 10\")   Wt (!) 163.3 kg (360 lb)   SpO2 96%   BMI 51.65 kg/m      Physical Exam  GENERAL: healthy, alert and no distress  EYES: Eyes grossly normal to inspection, PERRL and conjunctivae and sclerae normal  HENT: " "ear canals and TM's normal, nose and mouth without ulcers or lesions  NECK: no adenopathy, no asymmetry, masses, or scars and thyroid normal to palpation  RESP: lungs clear to auscultation - no rales, rhonchi or wheezes  CV: regular rate and rhythm, normal S1 S2, no S3 or S4, no murmur, click or rub, no peripheral edema and peripheral pulses strong  ABDOMEN: soft, nontender, no hepatosplenomegaly, no masses and bowel sounds normal  MS: no gross musculoskeletal defects noted, no edema  SKIN: no suspicious lesions or rashes  NEURO: Normal strength and tone, mentation intact and speech normal  PSYCH: mentation appears normal, affect normal/bright        ASSESSMENT/PLAN:       ICD-10-CM    1. Routine general medical examination at a health care facility    Z00.00    2. Benign essential hypertension  I10 Well controlled.  hydrochlorothiazide (HYDRODIURIL) 25 MG tablet     losartan (COZAAR) 100 MG tablet     OFFICE/OUTPT VISIT,EST,LEVL III     3. Urinary frequency  R35.0 Well controlled.  tamsulosin (FLOMAX) 0.4 MG capsule     OFFICE/OUTPT VISIT,EST,LEVL III       Patient has been advised of split billing requirements and indicates understanding: Yes by AFR and CMA    COUNSELING:   Reviewed preventive health counseling, as reflected in patient instructions    Estimated body mass index is 51.65 kg/m  as calculated from the following:    Height as of this encounter: 1.778 m (5' 10\").    Weight as of this encounter: 163.3 kg (360 lb).     Weight management plan: Discussed healthy diet and exercise guidelines    He reports that he has never smoked. He has never used smokeless tobacco.      The risks, benefits and treatment options of prescribed medications or other treatments have been discussed with the patient. The patient verbalized their understanding and should call or follow up if no improvement or if they develop further problems.    RAVEN Graham [facility]  "

## 2021-07-09 ENCOUNTER — OFFICE VISIT (OUTPATIENT)
Dept: FAMILY MEDICINE | Facility: CLINIC | Age: 61
End: 2021-07-09
Payer: COMMERCIAL

## 2021-07-09 VITALS
HEIGHT: 70 IN | SYSTOLIC BLOOD PRESSURE: 118 MMHG | DIASTOLIC BLOOD PRESSURE: 86 MMHG | WEIGHT: 315 LBS | TEMPERATURE: 97.5 F | HEART RATE: 70 BPM | BODY MASS INDEX: 45.1 KG/M2 | OXYGEN SATURATION: 96 %

## 2021-07-09 DIAGNOSIS — I10 BENIGN ESSENTIAL HYPERTENSION: ICD-10-CM

## 2021-07-09 DIAGNOSIS — R35.0 URINARY FREQUENCY: ICD-10-CM

## 2021-07-09 DIAGNOSIS — Z00.00 ROUTINE GENERAL MEDICAL EXAMINATION AT A HEALTH CARE FACILITY: Primary | ICD-10-CM

## 2021-07-09 PROCEDURE — 99396 PREV VISIT EST AGE 40-64: CPT | Performed by: NURSE PRACTITIONER

## 2021-07-09 PROCEDURE — 99213 OFFICE O/P EST LOW 20 MIN: CPT | Mod: 25 | Performed by: NURSE PRACTITIONER

## 2021-07-09 RX ORDER — TAMSULOSIN HYDROCHLORIDE 0.4 MG/1
CAPSULE ORAL
Qty: 90 CAPSULE | Refills: 3 | Status: SHIPPED | OUTPATIENT
Start: 2021-07-09 | End: 2022-08-10

## 2021-07-09 RX ORDER — HYDROCHLOROTHIAZIDE 25 MG/1
25 TABLET ORAL DAILY
Qty: 90 TABLET | Refills: 3 | Status: SHIPPED | OUTPATIENT
Start: 2021-07-09 | End: 2022-08-10

## 2021-07-09 RX ORDER — LOSARTAN POTASSIUM 100 MG/1
100 TABLET ORAL DAILY
Qty: 90 TABLET | Refills: 3 | Status: SHIPPED | OUTPATIENT
Start: 2021-07-09 | End: 2022-08-10

## 2021-07-09 ASSESSMENT — MIFFLIN-ST. JEOR: SCORE: 2454.2

## 2022-01-10 ENCOUNTER — TELEPHONE (OUTPATIENT)
Dept: FAMILY MEDICINE | Facility: CLINIC | Age: 62
End: 2022-01-10
Payer: COMMERCIAL

## 2022-01-10 DIAGNOSIS — Z12.5 SCREENING FOR PROSTATE CANCER: ICD-10-CM

## 2022-01-10 DIAGNOSIS — I10 BENIGN ESSENTIAL HYPERTENSION: Primary | ICD-10-CM

## 2022-01-10 DIAGNOSIS — Z13.6 CARDIOVASCULAR SCREENING; LDL GOAL LESS THAN 160: ICD-10-CM

## 2022-01-10 NOTE — TELEPHONE ENCOUNTER
Reason for Call: Request for an order or referral:    Order or referral being requested: Patient is calling asking for lab orders he has his routine in July 2021 but did not get labs done    Date needed: at your convenience    Has the patient been seen by the PCP for this problem? YES    Phone number Patient can be reached at:  Home number on file [phone] (home)    Best Time:  any    Can we leave a detailed message on this number?  YES    Call taken on 1/10/2022 at 10:57 AM by Adelia Ramirez

## 2022-01-25 NOTE — TELEPHONE ENCOUNTER
Forwarding below request for lab orders to provider. Nothing due per say per HM, but appears patient last had BMP and PSA in 2020, hasn't had lipid panel done since 2019.  Would you like to place orders for fasting labs?  Orders pended for consideration.     Juanita Rader RN  [facility]

## 2022-02-08 ENCOUNTER — LAB (OUTPATIENT)
Dept: LAB | Facility: CLINIC | Age: 62
End: 2022-02-08
Payer: COMMERCIAL

## 2022-02-08 DIAGNOSIS — Z13.6 CARDIOVASCULAR SCREENING; LDL GOAL LESS THAN 160: ICD-10-CM

## 2022-02-08 DIAGNOSIS — I10 BENIGN ESSENTIAL HYPERTENSION: ICD-10-CM

## 2022-02-08 DIAGNOSIS — Z12.5 SCREENING FOR PROSTATE CANCER: ICD-10-CM

## 2022-02-08 LAB
ANION GAP SERPL CALCULATED.3IONS-SCNC: 4 MMOL/L (ref 3–14)
BUN SERPL-MCNC: 18 MG/DL (ref 7–30)
CALCIUM SERPL-MCNC: 9.2 MG/DL (ref 8.5–10.1)
CHLORIDE BLD-SCNC: 103 MMOL/L (ref 94–109)
CHOLEST SERPL-MCNC: 209 MG/DL
CO2 SERPL-SCNC: 32 MMOL/L (ref 20–32)
CREAT SERPL-MCNC: 0.99 MG/DL (ref 0.66–1.25)
FASTING STATUS PATIENT QL REPORTED: YES
GFR SERPL CREATININE-BSD FRML MDRD: 88 ML/MIN/1.73M2
GLUCOSE BLD-MCNC: 104 MG/DL (ref 70–99)
HDLC SERPL-MCNC: 39 MG/DL
LDLC SERPL CALC-MCNC: 140 MG/DL
NONHDLC SERPL-MCNC: 170 MG/DL
POTASSIUM BLD-SCNC: 3.7 MMOL/L (ref 3.4–5.3)
PSA SERPL-MCNC: 0.77 UG/L (ref 0–4)
SODIUM SERPL-SCNC: 139 MMOL/L (ref 133–144)
TRIGL SERPL-MCNC: 152 MG/DL

## 2022-02-08 PROCEDURE — 36415 COLL VENOUS BLD VENIPUNCTURE: CPT

## 2022-02-08 PROCEDURE — 80061 LIPID PANEL: CPT

## 2022-02-08 PROCEDURE — G0103 PSA SCREENING: HCPCS

## 2022-02-08 PROCEDURE — 80048 BASIC METABOLIC PNL TOTAL CA: CPT

## 2022-02-09 DIAGNOSIS — E78.5 HYPERLIPIDEMIA LDL GOAL <100: Primary | ICD-10-CM

## 2022-02-09 RX ORDER — ATORVASTATIN CALCIUM 10 MG/1
10 TABLET, FILM COATED ORAL DAILY
Qty: 90 TABLET | Refills: 1 | Status: SHIPPED | OUTPATIENT
Start: 2022-02-09 | End: 2022-07-27

## 2022-02-09 NOTE — RESULT ENCOUNTER NOTE
Please inform patient that test result showed high cholesterol.I would recommend starting medication to help control the cholesterol. Patient should let us know his preference.    Thank you.     Silvio Hernandes M.D.

## 2022-05-13 ENCOUNTER — TELEPHONE (OUTPATIENT)
Dept: FAMILY MEDICINE | Facility: CLINIC | Age: 62
End: 2022-05-13
Payer: COMMERCIAL

## 2022-05-13 NOTE — TELEPHONE ENCOUNTER
Patient is on hydrochlorothiazide and wants to know if it can cause him cancer.  Writer explained that he has not heard that hydrochlorothiazide causes cancer but that he could not say definitively that it does note.   Writer discussed the reasons why patient takes the medication and writer and patient agreed that it is important the patient continues to take his medication as prescribed so that he doesn't develop high blood pressure and incur the risks associated with that.  Patient stated understanding and thanked writer for his time.    Daniel TOBAR [facility]

## 2022-07-27 DIAGNOSIS — I10 BENIGN ESSENTIAL HYPERTENSION: ICD-10-CM

## 2022-07-27 DIAGNOSIS — R35.0 URINARY FREQUENCY: ICD-10-CM

## 2022-07-27 DIAGNOSIS — E78.5 HYPERLIPIDEMIA LDL GOAL <100: ICD-10-CM

## 2022-07-27 RX ORDER — TAMSULOSIN HYDROCHLORIDE 0.4 MG/1
CAPSULE ORAL
Qty: 90 CAPSULE | Refills: 3 | OUTPATIENT
Start: 2022-07-27

## 2022-07-27 RX ORDER — LOSARTAN POTASSIUM 100 MG/1
100 TABLET ORAL DAILY
Qty: 90 TABLET | Refills: 3 | OUTPATIENT
Start: 2022-07-27

## 2022-07-27 NOTE — TELEPHONE ENCOUNTER
Patient has an appointment with me on August 10.  Does he have enough medications to last until his appointment?  If not you may refill for 30 days so he does not run out.  Joleen Garcia, CNP

## 2022-07-27 NOTE — TELEPHONE ENCOUNTER
Patient only needs Atrovastatin refilled to get to his 8/10/22 appt. He has plenty of the other 3 medications. Natalya Dasilva on 7/27/2022 at 4:13 PM

## 2022-07-27 NOTE — TELEPHONE ENCOUNTER
"Requested Prescriptions   Pending Prescriptions Disp Refills    tamsulosin (FLOMAX) 0.4 MG capsule [Pharmacy Med Name: TAMSULOSIN 0.4MG CAPSULE] 90 capsule 3     Sig: TAKE 1 CAPSULE BY MOUTH ONCE A DAY        Alpha Blockers Failed - 7/27/2022  1:00 AM        Failed - Blood pressure under 140/90 in past 12 months       BP Readings from Last 3 Encounters:   07/09/21 118/86   10/02/20 136/86   05/14/20 (!) 144/92                 Passed - Recent (12 mo) or future (30 days) visit within the authorizing provider's specialty     Patient has had an office visit with the authorizing provider or a provider within the authorizing providers department within the previous 12 mos or has a future within next 30 days. See \"Patient Info\" tab in in[facility]et, or \"Choose Columns\" in Meds & Orders section of the refill encounter.              Passed - Patient does not have Tadalafil, Vardenafil, or Sildenafil on their medication list        Passed - Medication is active on med list        Passed - Patient is 18 years of age or older           losartan (COZAAR) 100 MG tablet [Pharmacy Med Name: LOSARTAN 100MG TABLET] 90 tablet 3     Sig: Take 1 tablet (100 mg) by mouth daily        Angiotensin-II Receptors Failed - 7/27/2022  1:00 AM        Failed - Last blood pressure under 140/90 in past 12 months       BP Readings from Last 3 Encounters:   07/09/21 118/86   10/02/20 136/86   05/14/20 (!) 144/92                 Passed - Recent (12 mo) or future (30 days) visit within the authorizing provider's specialty     Patient has had an office visit with the authorizing provider or a provider within the authorizing providers department within the previous 12 mos or has a future within next 30 days. See \"Patient Info\" tab in [facility], or \"Choose Columns\" in Meds & Orders section of the refill encounter.              Passed - Medication is active on med list        Passed - Patient is age 18 or older        Passed - Normal serum creatinine on file in " "past 12 months     Recent Labs   Lab Test 02/08/22  0837   CR 0.99       Ok to refill medication if creatinine is low          Passed - Normal serum potassium on file in past 12 months       Recent Labs   Lab Test 02/08/22  0837   POTASSIUM 3.7                       hydrochlorothiazide (HYDRODIURIL) 25 MG tablet [Pharmacy Med Name: HYDROCHLOROTHIAZIDE 25MG TAB] 90 tablet 3     Sig: Take 1 tablet (25 mg) by mouth daily        Diuretics (Including Combos) Protocol Failed - 7/27/2022  1:00 AM        Failed - Blood pressure under 140/90 in past 12 months       BP Readings from Last 3 Encounters:   07/09/21 118/86   10/02/20 136/86   05/14/20 (!) 144/92                 Passed - Recent (12 mo) or future (30 days) visit within the authorizing provider's specialty     Patient has had an office visit with the authorizing provider or a provider within the authorizing providers department within the previous 12 mos or has a future within next 30 days. See \"Patient Info\" tab in inbasket, or \"Choose Columns\" in Meds & Orders section of the refill encounter.              Passed - Medication is active on med list        Passed - Patient is age 18 or older        Passed - Normal serum creatinine on file in past 12 months       Recent Labs   Lab Test 02/08/22  0837   CR 0.99              Passed - Normal serum potassium on file in past 12 months       Recent Labs   Lab Test 02/08/22  0837   POTASSIUM 3.7                    Passed - Normal serum sodium on file in past 12 months       Recent Labs   Lab Test 02/08/22  0837                       "

## 2022-07-28 RX ORDER — HYDROCHLOROTHIAZIDE 25 MG/1
25 TABLET ORAL DAILY
Qty: 90 TABLET | Refills: 3 | OUTPATIENT
Start: 2022-07-28

## 2022-07-28 RX ORDER — ATORVASTATIN CALCIUM 10 MG/1
10 TABLET, FILM COATED ORAL DAILY
Qty: 30 TABLET | Refills: 0 | Status: SHIPPED | OUTPATIENT
Start: 2022-07-28 | End: 2022-08-10

## 2022-08-10 ENCOUNTER — OFFICE VISIT (OUTPATIENT)
Dept: FAMILY MEDICINE | Facility: CLINIC | Age: 62
End: 2022-08-10
Payer: COMMERCIAL

## 2022-08-10 VITALS
SYSTOLIC BLOOD PRESSURE: 138 MMHG | OXYGEN SATURATION: 95 % | RESPIRATION RATE: 20 BRPM | WEIGHT: 315 LBS | DIASTOLIC BLOOD PRESSURE: 88 MMHG | TEMPERATURE: 98.1 F | HEART RATE: 72 BPM | BODY MASS INDEX: 45.1 KG/M2 | HEIGHT: 70 IN

## 2022-08-10 DIAGNOSIS — E78.5 HYPERLIPIDEMIA LDL GOAL <100: ICD-10-CM

## 2022-08-10 DIAGNOSIS — R35.0 URINARY FREQUENCY: ICD-10-CM

## 2022-08-10 DIAGNOSIS — R53.83 FATIGUE, UNSPECIFIED TYPE: Primary | ICD-10-CM

## 2022-08-10 DIAGNOSIS — I10 BENIGN ESSENTIAL HYPERTENSION: ICD-10-CM

## 2022-08-10 DIAGNOSIS — E29.1 TESTICULAR HYPOFUNCTION: ICD-10-CM

## 2022-08-10 LAB
ANION GAP SERPL CALCULATED.3IONS-SCNC: 5 MMOL/L (ref 3–14)
B BURGDOR IGG+IGM SER QL: 0.05
BUN SERPL-MCNC: 18 MG/DL (ref 7–30)
CALCIUM SERPL-MCNC: 9.5 MG/DL (ref 8.5–10.1)
CHLORIDE BLD-SCNC: 106 MMOL/L (ref 94–109)
CHOLEST SERPL-MCNC: 165 MG/DL
CO2 SERPL-SCNC: 30 MMOL/L (ref 20–32)
CREAT SERPL-MCNC: 0.89 MG/DL (ref 0.66–1.25)
ERYTHROCYTE [DISTWIDTH] IN BLOOD BY AUTOMATED COUNT: 13.9 % (ref 10–15)
FASTING STATUS PATIENT QL REPORTED: YES
GFR SERPL CREATININE-BSD FRML MDRD: >90 ML/MIN/1.73M2
GLUCOSE BLD-MCNC: 113 MG/DL (ref 70–99)
HCT VFR BLD AUTO: 45.5 % (ref 40–53)
HDLC SERPL-MCNC: 35 MG/DL
HGB BLD-MCNC: 14.7 G/DL (ref 13.3–17.7)
LDLC SERPL CALC-MCNC: 93 MG/DL
MCH RBC QN AUTO: 28.2 PG (ref 26.5–33)
MCHC RBC AUTO-ENTMCNC: 32.3 G/DL (ref 31.5–36.5)
MCV RBC AUTO: 87 FL (ref 78–100)
NONHDLC SERPL-MCNC: 130 MG/DL
PLATELET # BLD AUTO: 208 10E3/UL (ref 150–450)
POTASSIUM BLD-SCNC: 4.1 MMOL/L (ref 3.4–5.3)
RBC # BLD AUTO: 5.21 10E6/UL (ref 4.4–5.9)
SODIUM SERPL-SCNC: 141 MMOL/L (ref 133–144)
TRIGL SERPL-MCNC: 183 MG/DL
TSH SERPL DL<=0.005 MIU/L-ACNC: 2.69 MU/L (ref 0.4–4)
WBC # BLD AUTO: 6.8 10E3/UL (ref 4–11)

## 2022-08-10 PROCEDURE — 80048 BASIC METABOLIC PNL TOTAL CA: CPT | Performed by: NURSE PRACTITIONER

## 2022-08-10 PROCEDURE — 99214 OFFICE O/P EST MOD 30 MIN: CPT | Performed by: NURSE PRACTITIONER

## 2022-08-10 PROCEDURE — 36415 COLL VENOUS BLD VENIPUNCTURE: CPT | Performed by: NURSE PRACTITIONER

## 2022-08-10 PROCEDURE — 84443 ASSAY THYROID STIM HORMONE: CPT | Performed by: NURSE PRACTITIONER

## 2022-08-10 PROCEDURE — 84403 ASSAY OF TOTAL TESTOSTERONE: CPT | Performed by: NURSE PRACTITIONER

## 2022-08-10 PROCEDURE — 86618 LYME DISEASE ANTIBODY: CPT | Performed by: NURSE PRACTITIONER

## 2022-08-10 PROCEDURE — 85027 COMPLETE CBC AUTOMATED: CPT | Performed by: NURSE PRACTITIONER

## 2022-08-10 PROCEDURE — 80061 LIPID PANEL: CPT | Performed by: NURSE PRACTITIONER

## 2022-08-10 RX ORDER — ATORVASTATIN CALCIUM 10 MG/1
10 TABLET, FILM COATED ORAL DAILY
Qty: 90 TABLET | Refills: 3 | Status: SHIPPED | OUTPATIENT
Start: 2022-08-10 | End: 2023-08-16

## 2022-08-10 RX ORDER — LOSARTAN POTASSIUM 100 MG/1
100 TABLET ORAL DAILY
Qty: 90 TABLET | Refills: 3 | Status: SHIPPED | OUTPATIENT
Start: 2022-08-10 | End: 2023-08-16

## 2022-08-10 RX ORDER — HYDROCHLOROTHIAZIDE 25 MG/1
25 TABLET ORAL DAILY
Qty: 90 TABLET | Refills: 3 | Status: SHIPPED | OUTPATIENT
Start: 2022-08-10 | End: 2023-09-11

## 2022-08-10 RX ORDER — TAMSULOSIN HYDROCHLORIDE 0.4 MG/1
CAPSULE ORAL
Qty: 90 CAPSULE | Refills: 3 | Status: SHIPPED | OUTPATIENT
Start: 2022-08-10 | End: 2023-09-11

## 2022-08-10 ASSESSMENT — PAIN SCALES - GENERAL: PAINLEVEL: NO PAIN (0)

## 2022-08-10 NOTE — PROGRESS NOTES
Assessment & Plan     Fatigue, unspecified type  Etiology unclear.   lab work-up today:  - CBC with platelets; Future  - TSH with free T4 reflex; Future  - Lyme Disease Total Abs Bld with Reflex to Confirm CLIA; Future  - CBC with platelets  - TSH with free T4 reflex  - Lyme Disease Total Abs Bld with Reflex to Confirm CLIA    Hyperlipidemia LDL goal <100  Continue statin:  - atorvastatin (LIPITOR) 10 MG tablet; Take 1 tablet (10 mg) by mouth daily  - Lipid panel reflex to direct LDL Fasting; Future  - Lipid panel reflex to direct LDL Fasting    Benign essential hypertension  Blood pressure is mildly high today.  Patient states it was because he had to wear a mask in clinic.  For now continue current medications:  - hydrochlorothiazide (HYDRODIURIL) 25 MG tablet; Take 1 tablet (25 mg) by mouth daily  - losartan (COZAAR) 100 MG tablet; Take 1 tablet (100 mg) by mouth daily  - Basic metabolic panel  (Ca, Cl, CO2, Creat, Gluc, K, Na, BUN); Future  - Basic metabolic panel  (Ca, Cl, CO2, Creat, Gluc, K, Na, BUN)    Urinary frequency  Well-controlled  - tamsulosin (FLOMAX) 0.4 MG capsule; TAKE 1 CAPSULE BY MOUTH ONCE A DAY    Testicular hypofunction  Due for recheck  - Testosterone, total; Future  - Testosterone, total      The risks, benefits and treatment options of prescribed medications or other treatments have been discussed with the patient. The patient verbalized their understanding and should call or follow up if no improvement or if they develop further problems.    RAVEN Graham CNP  [facility]          Salud Carrasquillo is a 62 year old, presenting for the following health issues:  Fatigue, Musculoskeletal Problem (Concerns about the palm of his hand- contractures?), Medication Reconciliation (Concerns about hydrochlorothiazide, does this cause cancer?), and Health Maintenance (Vaccines declined today)      HPI     Concern - fatigue  Onset: 3-4 months  Description: patient  "reporting fatigue concerns  After work he comes home and has to take a nap for an hour  Intensity: mild  Progression of Symptoms:  same  Accompanying Signs & Symptoms: nothing  Previous history of similar problem: yes  Precipitating factors:        Worsened by: unknown  Alleviating factors:        Improved by: nothing  Therapies tried and outcome: would like his testosterone checked and blood test for  Lyme disease.  Has had low testosterone levels in the past.        Chief Complaint   Patient presents with     Fatigue     Musculoskeletal Problem     Concerns about the palm of his hand- contractures?  Does not want steroid injections or surgery.     Medication Reconciliation     Concerns about hydrochlorothiazide, does this cause cancer?     Hyperlipidemia Follow-Up      Are you regularly taking any medication or supplement to lower your cholesterol?   Yes- atorvastatin    Are you having muscle aches or other side effects that you think could be caused by your cholesterol lowering medication?  No    Hypertension Follow-up      Do you check your blood pressure regularly outside of the clinic? No     Are you following a low salt diet? Yes    Are your blood pressures ever more than 140 on the top number (systolic) OR more   than 90 on the bottom number (diastolic), for example 140/90? No      Medication Followup of tamsulosin    Taking Medication as prescribed: yes    Side Effects:  None    Medication Helping Symptoms:  yes             Review of Systems   Constitutional, HEENT, cardiovascular, pulmonary, GI, , musculoskeletal, neuro, skin, endocrine and psych systems are negative, except as otherwise noted.      Objective    /88   Pulse 72   Temp 98.1  F (36.7  C) (Tympanic)   Resp 20   Ht 1.778 m (5' 10\")   Wt (!) 167.4 kg (369 lb)   SpO2 95%   BMI 52.95 kg/m    Body mass index is 52.95 kg/m .  Physical Exam   GENERAL: healthy, alert and no distress  NECK: no adenopathy, no asymmetry, masses, or scars " and thyroid normal to palpation  RESP: lungs clear to auscultation - no rales, rhonchi or wheezes  CV: regular rate and rhythm, normal S1 S2, no S3 or S4, no murmur, click or rub, no peripheral edema and peripheral pulses strong  MS: no gross musculoskeletal defects noted, no edema                    .  ..

## 2022-08-10 NOTE — LETTER
August 12, 2022      Foreign ZUÑIGA Dejah  [address] [address]            Foreign    Lyme test was negative.   Thyroid is normal.   Cholesterol is much improved.   Kidney function and electrolytes are  normal.   Blood counts are normal.     Testosterone is very low.   Please schedule a consultation with urology - I placed a referral.       Resulted Orders   CBC with platelets   Result Value Ref Range    WBC Count 6.8 4.0 - 11.0 10e3/uL    RBC Count 5.21 4.40 - 5.90 10e6/uL    Hemoglobin 14.7 13.3 - 17.7 g/dL    Hematocrit 45.5 40.0 - 53.0 %    MCV 87 78 - 100 fL    MCH 28.2 26.5 - 33.0 pg    MCHC 32.3 31.5 - 36.5 g/dL    RDW 13.9 10.0 - 15.0 %    Platelet Count 208 150 - 450 10e3/uL   TSH with free T4 reflex   Result Value Ref Range    TSH 2.69 0.40 - 4.00 mU/L   Lyme Disease Total Abs Bld with Reflex to Confirm CLIA   Result Value Ref Range    Lyme Disease Antibodies Total 0.05 <0.90      Comment:      Non-reactive, Absence of detectable Borrelia burgdorferi antibodies. A non-reactive result does not exclude the possibility of Borrelia burgdorferi infection. If early Lyme disease is suspected, a second sample should be collected and tested 2 to 4 weeks later.   Testosterone, total   Result Value Ref Range    Testosterone Total 88 (L) 240 - 950 ng/dL   Lipid panel reflex to direct LDL Fasting   Result Value Ref Range    Cholesterol 165 <200 mg/dL    Triglycerides 183 (H) <150 mg/dL    Direct Measure HDL 35 (L) >=40 mg/dL    LDL Cholesterol Calculated 93 <=100 mg/dL    Non HDL Cholesterol 130 (H) <130 mg/dL    Patient Fasting > 8hrs? Yes       Comment:      Fasting since  Fasting since  Fasting since  Fasting since  Fasting since  Fasting since  Fasting since    Narrative    Cholesterol  Desirable:  <200 mg/dL    Triglycerides  Normal:  Less than 150 mg/dL  Borderline High:  150-199 mg/dL  High:  200-499 mg/dL  Very High:  Greater than or equal to 500 mg/dL    Direct Measure HDL  Female:  Greater than or  equal to 50 mg/dL   Male:  Greater than or equal to 40 mg/dL    LDL Cholesterol  Desirable:  <100mg/dL  Above Desirable:  100-129 mg/dL   Borderline High:  130-159 mg/dL   High:  160-189 mg/dL   Very High:  >= 190 mg/dL    Non HDL Cholesterol  Desirable:  130 mg/dL  Above Desirable:  130-159 mg/dL  Borderline High:  160-189 mg/dL  High:  190-219 mg/dL  Very High:  Greater than or equal to 220 mg/dL   Basic metabolic panel  (Ca, Cl, CO2, Creat, Gluc, K, Na, BUN)   Result Value Ref Range    Sodium 141 133 - 144 mmol/L    Potassium 4.1 3.4 - 5.3 mmol/L    Chloride 106 94 - 109 mmol/L    Carbon Dioxide (CO2) 30 20 - 32 mmol/L    Anion Gap 5 3 - 14 mmol/L    Urea Nitrogen 18 7 - 30 mg/dL    Creatinine 0.89 0.66 - 1.25 mg/dL    Calcium 9.5 8.5 - 10.1 mg/dL    Glucose 113 (H) 70 - 99 mg/dL    GFR Estimate >90 >60 mL/min/1.73m2      Comment:      Effective December 21, 2021 eGFRcr in adults is calculated using the 2021 CKD-EPI creatinine equation which includes age and gender (Nakia et al., NEJM, DOI: 10.1056/JBAOhd6765189)       If you have any questions or concerns, please call the clinic at the number listed above.       Sincerely,      RAVEN Graham CNP/aw

## 2022-08-11 ENCOUNTER — TELEPHONE (OUTPATIENT)
Dept: FAMILY MEDICINE | Facility: CLINIC | Age: 62
End: 2022-08-11

## 2022-08-11 NOTE — TELEPHONE ENCOUNTER
Pt called asking for lab results from recent visit?  I see that testosterone level is not back yet.  Pt is aware that provider may be waiting on testosterone before providing interpretation regarding his recent battery of tests.    Routed to provider.    RORO Edwards RN

## 2022-08-12 DIAGNOSIS — R79.89 LOW TESTOSTERONE: Primary | ICD-10-CM

## 2022-08-12 LAB — TESTOST SERPL-MCNC: 88 NG/DL (ref 240–950)

## 2023-01-19 ENCOUNTER — TELEPHONE (OUTPATIENT)
Dept: FAMILY MEDICINE | Facility: CLINIC | Age: 63
End: 2023-01-19
Payer: COMMERCIAL

## 2023-01-19 DIAGNOSIS — R79.89 LOW TESTOSTERONE: Primary | ICD-10-CM

## 2023-01-19 NOTE — TELEPHONE ENCOUNTER
Order/Referral Request    Who is requesting:  Patient      Orders being requested: New referral needed to Endocrinology.  Patient called number on the referral and they sent him back to us to get a new referral    Reason service is needed/diagnosis: Low testosterone     When are orders needed by: asap    Has this been discussed with Provider: Yes    Does patient have a preference on a Group/Provider/Facility? [place]    Does patient have an appointment scheduled?: No    Where to send orders: N/A    Okay to leave a detailed message?: Yes at Cell number on file:    Telephone Information:   Mobile [phone]

## 2023-01-20 NOTE — TELEPHONE ENCOUNTER
Left message for patient to return call to clinic.     *noted pt had OV 8/2022 & ILIANA Garcia put in urology referral for low testosterone. Phone number to call for appt: [phone].    Peggy Godinez RN

## 2023-01-20 NOTE — TELEPHONE ENCOUNTER
Pt calls back & he did call the referral number & was told that urology does not deal with low testosterone. Needs referral to endocrinology.    Routing to provider for review.    Peggy Godinez RN

## 2023-01-20 NOTE — TELEPHONE ENCOUNTER
Joleen,    Please see telephone note.  Pt asking for endocrinology referral for low testosterone.  I have pended the referral for your consideration.    Peggy Godinez RN

## 2023-02-01 ENCOUNTER — OFFICE VISIT (OUTPATIENT)
Dept: ENDOCRINOLOGY | Facility: CLINIC | Age: 63
End: 2023-02-01
Attending: NURSE PRACTITIONER
Payer: COMMERCIAL

## 2023-02-01 VITALS — DIASTOLIC BLOOD PRESSURE: 101 MMHG | HEART RATE: 76 BPM | RESPIRATION RATE: 18 BRPM | SYSTOLIC BLOOD PRESSURE: 157 MMHG

## 2023-02-01 DIAGNOSIS — R06.83 SNORING: Primary | ICD-10-CM

## 2023-02-01 DIAGNOSIS — R79.89 LOW TESTOSTERONE: ICD-10-CM

## 2023-02-01 LAB
FSH SERPL IRP2-ACNC: 11 MIU/ML (ref 1.5–12.4)
LH SERPL-ACNC: 6 MIU/ML (ref 1.7–8.6)
PROLACTIN SERPL 3RD IS-MCNC: 8 NG/ML (ref 4–15)
SHBG SERPL-SCNC: 18 NMOL/L (ref 11–80)

## 2023-02-01 PROCEDURE — 84270 ASSAY OF SEX HORMONE GLOBUL: CPT | Performed by: INTERNAL MEDICINE

## 2023-02-01 PROCEDURE — 36415 COLL VENOUS BLD VENIPUNCTURE: CPT | Performed by: INTERNAL MEDICINE

## 2023-02-01 PROCEDURE — 83001 ASSAY OF GONADOTROPIN (FSH): CPT | Performed by: INTERNAL MEDICINE

## 2023-02-01 PROCEDURE — 99204 OFFICE O/P NEW MOD 45 MIN: CPT | Performed by: INTERNAL MEDICINE

## 2023-02-01 PROCEDURE — 84403 ASSAY OF TOTAL TESTOSTERONE: CPT | Performed by: INTERNAL MEDICINE

## 2023-02-01 PROCEDURE — 83002 ASSAY OF GONADOTROPIN (LH): CPT | Performed by: INTERNAL MEDICINE

## 2023-02-01 PROCEDURE — 84146 ASSAY OF PROLACTIN: CPT | Performed by: INTERNAL MEDICINE

## 2023-02-01 NOTE — PROGRESS NOTES
Endocrine Consult note    Attending Assessment/Plan :        Low testosterone  Snoring    Assessment:  -needs a second testosterone check as low testosterone diagnosis requires two low morning testosterone values  -also needs basic secondary workup done with FSH/LH, prolactin  -sleep study should also be part of initial workup for hypogonadism given risk factors  -pt feels he would be unable to fit in MRI machine so would decline this test if labs pointed to pituitary source    Plan:  -will start with repeating testosterone today to get second value  -will draw FSH/LH and prolactin today  -send referral for sleep study  -if starting TRT will plan to start topical androgel and get on stable dose and discharge back to PCP for continued management    RTC 6 months    I have independently reviewed and interpreted labs, imaging as indicated.      Chief complaint:  Foreign is a 62 year old male seen in consultation for low testosterone      HISTORY OF PRESENT ILLNESS    Foreign is a 62M with htn, hx of testosterone therapy >10 years ago who is referred to endocrinology for low testosterone.     He reports that he had been getting testosterone injections but states that last was about 10 years ago.      He was found on lab workup to have low testosterone <100 x1.  Endorses low libido and ED.  States that energy level is okay.     Does endorse snoring.  Has not had sleep study.     We discussed needing to screen for underlying etiology first before starting TRT and that would need to be sleep study and further labs today.  If labs pointed to pituitary source, would typically need to consider MRI but he is concerned he would be too large for MRI so would decline this test.       Endocrine relevant labs and/or imaging are as follows:  Component      Latest Ref Rng & Units 2/8/2022 8/10/2022   WBC      4.0 - 11.0 10e3/uL  6.8   RBC Count      4.40 - 5.90 10e6/uL  5.21   Hemoglobin      13.3 - 17.7 g/dL  14.7   Hematocrit       40.0 - 53.0 %  45.5   MCV      78 - 100 fL  87   MCH      26.5 - 33.0 pg  28.2   MCHC      31.5 - 36.5 g/dL  32.3   RDW      10.0 - 15.0 %  13.9   Platelet Count      150 - 450 10e3/uL  208   PSA      0.00 - 4.00 ug/L 0.77    TSH      0.40 - 4.00 mU/L  2.69   Testosterone Total      240 - 950 ng/dL  88 (L)     REVIEW OF SYSTEMS    10 system ROS otherwise as per the HPI or negative    Past Medical History  Past Medical History:   Diagnosis Date     Asthma      BPH (benign prostatic hyperplasia)      Hypercholesteremia      Hypertension      Mild intermittent asthma 8/15/2006    Patient states he does not have asthma.  Doesn't use albuterol ever.          Medications  Current Outpatient Medications   Medication Sig Dispense Refill     acetaminophen (TYLENOL) 325 MG tablet Take 650 mg by mouth every 6 hours as needed for mild pain       Ascorbic Acid (VITAMIN C) 100 MG CHEW        aspirin (ASA) 81 MG chewable tablet Take 2 tablets (162 mg) by mouth daily 28 tablet 0     atorvastatin (LIPITOR) 10 MG tablet Take 1 tablet (10 mg) by mouth daily 90 tablet 3     Ginger, Zingiber officinalis, (GINGER ROOT PO) Take 1 tablet by mouth daily       glucosamine-chondroitin 500-400 MG PO CAPS per capsule Take 1 capsule by mouth daily       hydrochlorothiazide (HYDRODIURIL) 25 MG tablet Take 1 tablet (25 mg) by mouth daily 90 tablet 3     losartan (COZAAR) 100 MG tablet Take 1 tablet (100 mg) by mouth daily 90 tablet 3     polyethylene glycol (MIRALAX/GLYCOLAX) packet Take 17 g by mouth 2 times daily as needed (constipation) 7 packet 0     Probiotic Product (PROBIOTIC PO) Take 4 capsules by mouth daily       senna-docusate (SENOKOT-S/PERICOLACE) 8.6-50 MG tablet Take 2 tablets by mouth 2 times daily 40 tablet 0     tamsulosin (FLOMAX) 0.4 MG capsule TAKE 1 CAPSULE BY MOUTH ONCE A DAY 90 capsule 3     TURMERIC PO Take 1 tablet by mouth daily       Vitamin D, Cholecalciferol, 25 MCG (1000 UT) CAPS          Allergies  Allergies    Allergen Reactions     Lisinopril Cough         Family History  family history includes Alcohol/Drug in his maternal grandfather and paternal grandfather; C.A.D. in his father; Cancer in his mother; Cerebrovascular Disease in his paternal grandfather; Heart Disease in his brother; Hypertension in his brother and father; Kidney Cancer in his father; Respiratory in his mother; Thyroid Disease in his father.    Social History  Social History     Tobacco Use     Smoking status: Never     Smokeless tobacco: Never   Substance Use Topics     Alcohol use: No     Drug use: No       Physical Exam  BP (!) 157/101 (BP Location: Left arm, Patient Position: Chair, Cuff Size: Adult Large)   Pulse 76   Resp 18   There is no height or weight on file to calculate BMI.    Physical Exam    GENERAL :  In no apparent distress  SKIN: Normal color, normal temperature     EYES: EOMI, No scleral icterus  NECK: No visible masses  RESP: No respiratory distress, normal effort  CARDIO: regular rate  ABDOMEN: flat, nondistended       NEURO: awake, alert, responds appropriately to questions   EXTREMITIES: No clubbing, cyanosis or edema.    I spent a total of 46 minutes on the day of this new patient visit on pre-visit chart review, lab review, coordinating care, exam and counseling of patient

## 2023-02-01 NOTE — LETTER
2/1/2023         RE: Foreign Pond  [address] [address]        Dear Colleague,    Thank you for referring your patient, Foreign Pond, to the [facility] ENDOCRINOLOGY. Please see a copy of my visit note below.    Endocrine Consult note    Attending Assessment/Plan :        Low testosterone  Snoring    Assessment:  -needs a second testosterone check as low testosterone diagnosis requires two low morning testosterone values  -also needs basic secondary workup done with FSH/LH, prolactin  -sleep study should also be part of initial workup for hypogonadism given risk factors  -pt feels he would be unable to fit in MRI machine so would decline this test if labs pointed to pituitary source    Plan:  -will start with repeating testosterone today to get second value  -will draw FSH/LH and prolactin today  -send referral for sleep study  -if starting TRT will plan to start topical androgel and get on stable dose and discharge back to PCP for continued management    RTC 6 months    I have independently reviewed and interpreted labs, imaging as indicated.      Chief complaint:  Foreign is a 62 year old male seen in consultation for low testosterone      HISTORY OF PRESENT ILLNESS    Foreign is a 62M with htn, hx of testosterone therapy >10 years ago who is referred to endocrinology for low testosterone.     He reports that he had been getting testosterone injections but states that last was about 10 years ago.      He was found on lab workup to have low testosterone <100 x1.  Endorses low libido and ED.  States that energy level is okay.     Does endorse snoring.  Has not had sleep study.     We discussed needing to screen for underlying etiology first before starting TRT and that would need to be sleep study and further labs today.  If labs pointed to pituitary source, would typically need to consider MRI but he is concerned he would be too large for MRI so would decline this test.        Endocrine relevant labs and/or imaging are as follows:  Component      Latest Ref Rng & Units 2/8/2022 8/10/2022   WBC      4.0 - 11.0 10e3/uL  6.8   RBC Count      4.40 - 5.90 10e6/uL  5.21   Hemoglobin      13.3 - 17.7 g/dL  14.7   Hematocrit      40.0 - 53.0 %  45.5   MCV      78 - 100 fL  87   MCH      26.5 - 33.0 pg  28.2   MCHC      31.5 - 36.5 g/dL  32.3   RDW      10.0 - 15.0 %  13.9   Platelet Count      150 - 450 10e3/uL  208   PSA      0.00 - 4.00 ug/L 0.77    TSH      0.40 - 4.00 mU/L  2.69   Testosterone Total      240 - 950 ng/dL  88 (L)     REVIEW OF SYSTEMS    10 system ROS otherwise as per the HPI or negative    Past Medical History  Past Medical History:   Diagnosis Date     Asthma      BPH (benign prostatic hyperplasia)      Hypercholesteremia      Hypertension      Mild intermittent asthma 8/15/2006    Patient states he does not have asthma.  Doesn't use albuterol ever.          Medications  Current Outpatient Medications   Medication Sig Dispense Refill     acetaminophen (TYLENOL) 325 MG tablet Take 650 mg by mouth every 6 hours as needed for mild pain       Ascorbic Acid (VITAMIN C) 100 MG CHEW        aspirin (ASA) 81 MG chewable tablet Take 2 tablets (162 mg) by mouth daily 28 tablet 0     atorvastatin (LIPITOR) 10 MG tablet Take 1 tablet (10 mg) by mouth daily 90 tablet 3     Ginger, Zingiber officinalis, (GINGER ROOT PO) Take 1 tablet by mouth daily       glucosamine-chondroitin 500-400 MG PO CAPS per capsule Take 1 capsule by mouth daily       hydrochlorothiazide (HYDRODIURIL) 25 MG tablet Take 1 tablet (25 mg) by mouth daily 90 tablet 3     losartan (COZAAR) 100 MG tablet Take 1 tablet (100 mg) by mouth daily 90 tablet 3     polyethylene glycol (MIRALAX/GLYCOLAX) packet Take 17 g by mouth 2 times daily as needed (constipation) 7 packet 0     Probiotic Product (PROBIOTIC PO) Take 4 capsules by mouth daily       senna-docusate (SENOKOT-S/PERICOLACE) 8.6-50 MG tablet Take 2 tablets by  mouth 2 times daily 40 tablet 0     tamsulosin (FLOMAX) 0.4 MG capsule TAKE 1 CAPSULE BY MOUTH ONCE A DAY 90 capsule 3     TURMERIC PO Take 1 tablet by mouth daily       Vitamin D, Cholecalciferol, 25 MCG (1000 UT) CAPS          Allergies  Allergies   Allergen Reactions     Lisinopril Cough         Family History  family history includes Alcohol/Drug in his maternal grandfather and paternal grandfather; C.A.D. in his father; Cancer in his mother; Cerebrovascular Disease in his paternal grandfather; Heart Disease in his brother; Hypertension in his brother and father; Kidney Cancer in his father; Respiratory in his mother; Thyroid Disease in his father.    Social History  Social History     Tobacco Use     Smoking status: Never     Smokeless tobacco: Never   Substance Use Topics     Alcohol use: No     Drug use: No       Physical Exam  BP (!) 157/101 (BP Location: Left arm, Patient Position: Chair, Cuff Size: Adult Large)   Pulse 76   Resp 18   There is no height or weight on file to calculate BMI.    Physical Exam    GENERAL :  In no apparent distress  SKIN: Normal color, normal temperature     EYES: EOMI, No scleral icterus  NECK: No visible masses  RESP: No respiratory distress, normal effort  CARDIO: regular rate  ABDOMEN: flat, nondistended       NEURO: awake, alert, responds appropriately to questions   EXTREMITIES: No clubbing, cyanosis or edema.    I spent a total of 46 minutes on the day of this new patient visit on pre-visit chart review, lab review, coordinating care, exam and counseling of patient      Again, thank you for allowing me to participate in the care of your patient.        Sincerely,        Kennedy Covington MD

## 2023-02-01 NOTE — NURSING NOTE
"Initial BP (!) 157/101 (BP Location: Left arm, Patient Position: Chair, Cuff Size: Adult Large)   Pulse 76   Resp 18  Estimated body mass index is 52.95 kg/m  as calculated from the following:    Height as of 8/10/22: 1.778 m (5' 10\").    Weight as of 8/10/22: 167.4 kg (369 lb). .    Patient is here for low testosterone.  juanjo carvajal LPN    "

## 2023-02-03 LAB
TESTOST FREE SERPL-MCNC: 2.66 NG/DL
TESTOST SERPL-MCNC: 106 NG/DL (ref 240–950)
TESTOST SERPL-MCNC: 99 NG/DL (ref 240–950)

## 2023-02-03 RX ORDER — TESTOSTERONE 1.62 MG/G
2 GEL TRANSDERMAL DAILY
Qty: 75 G | Refills: 5 | Status: SHIPPED | OUTPATIENT
Start: 2023-02-03 | End: 2023-09-13

## 2023-02-07 ENCOUNTER — TELEPHONE (OUTPATIENT)
Dept: ENDOCRINOLOGY | Facility: CLINIC | Age: 63
End: 2023-02-07
Payer: COMMERCIAL

## 2023-03-27 ENCOUNTER — TELEPHONE (OUTPATIENT)
Dept: FAMILY MEDICINE | Facility: CLINIC | Age: 63
End: 2023-03-27
Payer: COMMERCIAL

## 2023-03-27 NOTE — TELEPHONE ENCOUNTER
Blood pressure was elevated at last clinic visit. It is important to maintain a blood pressure <140/90.  Please call patient and ask them to:      Come in for a free RN appointment to recheck blood pressure    Joleen Garcia CNP

## 2023-03-28 NOTE — TELEPHONE ENCOUNTER
Patient Quality Outreach    Patient is due for the following:   Hypertension -  BP check    Next Steps:   Schedule a nurse only visit for bp recheck    Type of outreach:    Phone, spoke to patient/parent. Also has some spots on his face he would like checked, scheduled for appt with provider on 4/3/23.      Questions for provider review:    None     Alie Caban, CMA

## 2023-04-03 ENCOUNTER — OFFICE VISIT (OUTPATIENT)
Dept: FAMILY MEDICINE | Facility: CLINIC | Age: 63
End: 2023-04-03
Payer: COMMERCIAL

## 2023-04-03 VITALS
BODY MASS INDEX: 45.1 KG/M2 | TEMPERATURE: 97.1 F | WEIGHT: 315 LBS | SYSTOLIC BLOOD PRESSURE: 157 MMHG | RESPIRATION RATE: 20 BRPM | HEIGHT: 70 IN | DIASTOLIC BLOOD PRESSURE: 110 MMHG | OXYGEN SATURATION: 97 % | HEART RATE: 71 BPM

## 2023-04-03 DIAGNOSIS — I10 BENIGN ESSENTIAL HYPERTENSION: Primary | ICD-10-CM

## 2023-04-03 DIAGNOSIS — L82.1 SEBORRHEIC KERATOSES: ICD-10-CM

## 2023-04-03 PROCEDURE — 99214 OFFICE O/P EST MOD 30 MIN: CPT | Performed by: NURSE PRACTITIONER

## 2023-04-03 RX ORDER — AMLODIPINE BESYLATE 5 MG/1
5 TABLET ORAL DAILY
Qty: 90 TABLET | Refills: 3 | Status: SHIPPED | OUTPATIENT
Start: 2023-04-03 | End: 2024-04-29

## 2023-04-03 ASSESSMENT — PAIN SCALES - GENERAL: PAINLEVEL: NO PAIN (0)

## 2023-04-03 NOTE — PROGRESS NOTES
Assessment & Plan     Benign essential hypertension  Poorly controlled.  On max doses of Losartan and hydrochlorothiazide.  Add:  - amLODIPine (NORVASC) 5 MG tablet; Take 1 tablet (5 mg) by mouth daily  Recheck BP in one month.    Seborrheic keratoses  Exam c/w seborrheic keratosis.  Discussed benign diagnosis.      The risks, benefits and treatment options of prescribed medications or other treatments have been discussed with the patient. The patient verbalized their understanding and should call or follow up if no improvement or if they develop further problems.    RAVEN Graham CNP  [facility] ISABEL Carrasquillo is a 62 year old, presenting for the following health issues:  Hypertension and Derm Problem        4/3/2023     8:21 AM   Additional Questions   Roomed by Drew THOMAS CMA   Accompanied by none         4/3/2023     8:21 AM   Patient Reported Additional Medications   Patient reports taking the following new medications none     HPI     Hypertension Follow-up      Do you check your blood pressure regularly outside of the clinic? Yes     Are you following a low salt diet? Yes    Are your blood pressures ever more than 140 on the top number (systolic) OR more   than 90 on the bottom number (diastolic), for example 140/90? No      How many servings of fruits and vegetables do you eat daily?  2-3    On average, how many sweetened beverages do you drink each day (Examples: soda, juice, sweet tea, etc.  Do NOT count diet or artificially sweetened beverages)?   0    How many days per week do you exercise enough to make your heart beat faster? 5     How many minutes a day do you exercise enough to make your heart beat faster? 30 - 60    How many days per week do you miss taking your medication? 0      Concern - Skin Spots   Onset: ongoing   Description: skin spots to be checked on face, brown.   Intensity: mild  Progression of Symptoms:  same  Accompanying Signs &  "Symptoms: none   Previous history of similar problem: none   Precipitating factors:        Worsened by: none   Alleviating factors:        Improved by: none   Therapies tried and outcome:  none         BP Readings from Last 6 Encounters:   04/03/23 (!) 157/110   02/01/23 (!) 157/101   08/10/22 138/88   07/09/21 118/86   10/02/20 136/86   05/14/20 (!) 144/92       Wt Readings from Last 4 Encounters:   04/03/23 (!) 168.8 kg (372 lb 1.6 oz)   08/10/22 (!) 167.4 kg (369 lb)   07/09/21 (!) 163.3 kg (360 lb)   10/02/20 (!) 161.9 kg (357 lb)         Review of Systems   Constitutional, HEENT, cardiovascular, pulmonary, gi and gu systems are negative, except as otherwise noted.      Objective    BP (!) 157/110 (BP Location: Right arm, Patient Position: Chair, Cuff Size: Adult Large)   Pulse 71   Temp 97.1  F (36.2  C) (Tympanic)   Resp 20   Ht 1.778 m (5' 10\")   Wt (!) 168.8 kg (372 lb 1.6 oz)   SpO2 97%   BMI 53.39 kg/m    Body mass index is 53.39 kg/m .  Physical Exam   GENERAL: healthy, alert and no distress  SKIN: flat brown lesions with rough surface on the left cheek and left eyebrow.                    "

## 2023-05-22 ENCOUNTER — TELEPHONE (OUTPATIENT)
Dept: FAMILY MEDICINE | Facility: CLINIC | Age: 63
End: 2023-05-22
Payer: COMMERCIAL

## 2023-05-22 NOTE — TELEPHONE ENCOUNTER
Patient Quality Outreach    Patient is due for the following:   Hypertension -  BP check    Next Steps:   Schedule a nurse only visit for BP    Type of outreach:    Sent letter.      Questions for provider review:    None           Drew Campuzano, CMA

## 2023-05-22 NOTE — TELEPHONE ENCOUNTER
Blood pressure was elevated at last clinic visit - amlodipine added. It is important to maintain a blood pressure <140/90.  Please call patient and ask them to:      Come in for a free RN appointment to recheck blood pressure    Joleen Garcia CNP

## 2023-05-22 NOTE — LETTER
May 22, 2023      Foreign Pond  [address] [facility] [address]        Dear Foreign,     Your team at [facility] cares about your health. We have reviewed your chart and based on our findings; we are making the following recommendations to better manage your health.     You are in particular need of attention regarding the following:     HYPERTENSION FOLLOW UP: Blood pressure check with nurse  Blood pressure was elevated at last clinic visit - amlodipine added. It is important to maintain a blood pressure <140/90.  Please call to schedule to :         Come in for a free RN appointment to recheck blood pressure        If you have already completed these items, please contact the clinic via phone or   [facility] so your care team can review and update your records. Thank you for   choosing [facility] for your healthcare needs. For any questions,   concerns, or to schedule an appointment please contact our clinic.    Healthy Regards,      Your [facility] Care Team/  Joleen Garcia, CNP

## 2023-08-12 DIAGNOSIS — R79.89 LOW TESTOSTERONE: ICD-10-CM

## 2023-08-12 DIAGNOSIS — E78.5 HYPERLIPIDEMIA LDL GOAL <100: ICD-10-CM

## 2023-08-12 DIAGNOSIS — I10 BENIGN ESSENTIAL HYPERTENSION: ICD-10-CM

## 2023-08-12 NOTE — LETTER
Foreign Pond  [address] [address]        September 20, 2023      Dear Foreign Pond,      We received a request to refill testosterone gel.  This medication request has been filled for a 6 month supply.  Our records indicate that you are overdue for a follow up with Dr. Covington in Endocrinology.     Many medication require regular lab monitoring and follow up. You will need a follow up appointment before further refills will be given.    Many Specialty Clinics are booking months out in advance. Please call [phone] to make this appointment at your earliest convenience.     Sincerely,        Your [facility] Endocrinology care team

## 2023-08-14 NOTE — TELEPHONE ENCOUNTER
Called patient left message on identified VM requesting patient call care team back re: needs appointment for BP check/follow up.    Julie Behrendt RN

## 2023-08-16 RX ORDER — ATORVASTATIN CALCIUM 10 MG/1
10 TABLET, FILM COATED ORAL DAILY
Qty: 90 TABLET | Refills: 0 | Status: SHIPPED | OUTPATIENT
Start: 2023-08-16 | End: 2023-09-11

## 2023-08-16 RX ORDER — LOSARTAN POTASSIUM 100 MG/1
100 TABLET ORAL DAILY
Qty: 90 TABLET | Refills: 0 | Status: SHIPPED | OUTPATIENT
Start: 2023-08-16 | End: 2023-09-11

## 2023-09-07 NOTE — TELEPHONE ENCOUNTER
Pharmacy needs a new RX for this patient as they RX is only good for 6 months regardless of refills, please send a new prescription per pharmacy . Thank you

## 2023-09-11 ENCOUNTER — OFFICE VISIT (OUTPATIENT)
Dept: FAMILY MEDICINE | Facility: CLINIC | Age: 63
End: 2023-09-11
Payer: COMMERCIAL

## 2023-09-11 VITALS
HEIGHT: 70 IN | WEIGHT: 315 LBS | HEART RATE: 69 BPM | DIASTOLIC BLOOD PRESSURE: 108 MMHG | TEMPERATURE: 97 F | OXYGEN SATURATION: 96 % | RESPIRATION RATE: 20 BRPM | BODY MASS INDEX: 45.1 KG/M2 | SYSTOLIC BLOOD PRESSURE: 158 MMHG

## 2023-09-11 DIAGNOSIS — E78.5 HYPERLIPIDEMIA LDL GOAL <100: ICD-10-CM

## 2023-09-11 DIAGNOSIS — R35.0 URINARY FREQUENCY: ICD-10-CM

## 2023-09-11 DIAGNOSIS — I10 BENIGN ESSENTIAL HYPERTENSION: Primary | ICD-10-CM

## 2023-09-11 DIAGNOSIS — E66.01 MORBID OBESITY (H): ICD-10-CM

## 2023-09-11 DIAGNOSIS — Z12.5 SCREENING FOR PROSTATE CANCER: ICD-10-CM

## 2023-09-11 LAB
ANION GAP SERPL CALCULATED.3IONS-SCNC: 10 MMOL/L (ref 7–15)
BUN SERPL-MCNC: 14.4 MG/DL (ref 8–23)
CALCIUM SERPL-MCNC: 9.5 MG/DL (ref 8.8–10.2)
CHLORIDE SERPL-SCNC: 103 MMOL/L (ref 98–107)
CHOLEST SERPL-MCNC: 155 MG/DL
CREAT SERPL-MCNC: 0.98 MG/DL (ref 0.67–1.17)
DEPRECATED HCO3 PLAS-SCNC: 30 MMOL/L (ref 22–29)
EGFRCR SERPLBLD CKD-EPI 2021: 88 ML/MIN/1.73M2
GLUCOSE SERPL-MCNC: 105 MG/DL (ref 70–99)
HDLC SERPL-MCNC: 40 MG/DL
LDLC SERPL CALC-MCNC: 91 MG/DL
NONHDLC SERPL-MCNC: 115 MG/DL
POTASSIUM SERPL-SCNC: 4.1 MMOL/L (ref 3.4–5.3)
PSA SERPL DL<=0.01 NG/ML-MCNC: 0.65 NG/ML (ref 0–4.5)
SHBG SERPL-SCNC: 15 NMOL/L (ref 11–80)
SODIUM SERPL-SCNC: 143 MMOL/L (ref 136–145)
TRIGL SERPL-MCNC: 121 MG/DL

## 2023-09-11 PROCEDURE — 80048 BASIC METABOLIC PNL TOTAL CA: CPT | Performed by: NURSE PRACTITIONER

## 2023-09-11 PROCEDURE — G0103 PSA SCREENING: HCPCS | Performed by: NURSE PRACTITIONER

## 2023-09-11 PROCEDURE — 84403 ASSAY OF TOTAL TESTOSTERONE: CPT | Mod: 59 | Performed by: NURSE PRACTITIONER

## 2023-09-11 PROCEDURE — 84270 ASSAY OF SEX HORMONE GLOBUL: CPT | Performed by: NURSE PRACTITIONER

## 2023-09-11 PROCEDURE — 84403 ASSAY OF TOTAL TESTOSTERONE: CPT | Performed by: NURSE PRACTITIONER

## 2023-09-11 PROCEDURE — 36415 COLL VENOUS BLD VENIPUNCTURE: CPT | Performed by: NURSE PRACTITIONER

## 2023-09-11 PROCEDURE — 99214 OFFICE O/P EST MOD 30 MIN: CPT | Performed by: NURSE PRACTITIONER

## 2023-09-11 PROCEDURE — 80061 LIPID PANEL: CPT | Performed by: NURSE PRACTITIONER

## 2023-09-11 RX ORDER — TAMSULOSIN HYDROCHLORIDE 0.4 MG/1
CAPSULE ORAL
Qty: 90 CAPSULE | Refills: 3 | Status: SHIPPED | OUTPATIENT
Start: 2023-09-11

## 2023-09-11 RX ORDER — LOSARTAN POTASSIUM 100 MG/1
100 TABLET ORAL DAILY
Qty: 90 TABLET | Refills: 3 | Status: SHIPPED | OUTPATIENT
Start: 2023-09-11

## 2023-09-11 RX ORDER — HYDROCHLOROTHIAZIDE 25 MG/1
25 TABLET ORAL DAILY
Qty: 90 TABLET | Refills: 3 | Status: SHIPPED | OUTPATIENT
Start: 2023-09-11

## 2023-09-11 RX ORDER — ATORVASTATIN CALCIUM 10 MG/1
10 TABLET, FILM COATED ORAL DAILY
Qty: 90 TABLET | Refills: 3 | Status: SHIPPED | OUTPATIENT
Start: 2023-09-11

## 2023-09-11 ASSESSMENT — PAIN SCALES - GENERAL: PAINLEVEL: NO PAIN (0)

## 2023-09-11 NOTE — PROGRESS NOTES
Assessment & Plan     Benign essential hypertension  Poorly controlled off of the hydrochlorothiazide.  Restart hydrochlorothiazide   Continue losartan and amlodipine.  - hydrochlorothiazide (HYDRODIURIL) 25 MG tablet; Take 1 tablet (25 mg) by mouth daily  - losartan (COZAAR) 100 MG tablet; Take 1 tablet (100 mg) by mouth daily  - Basic metabolic panel  (Ca, Cl, CO2, Creat, Gluc, K, Na, BUN); Future  - Basic metabolic panel  (Ca, Cl, CO2, Creat, Gluc, K, Na, BUN)    Hyperlipidemia LDL goal <100  Well controlled.  - atorvastatin (LIPITOR) 10 MG tablet; Take 1 tablet (10 mg) by mouth daily  - Lipid panel reflex to direct LDL Fasting; Future  - Lipid panel reflex to direct LDL Fasting    Urinary frequency  Well controlled.  - tamsulosin (FLOMAX) 0.4 MG capsule; TAKE 1 CAPSULE BY MOUTH ONCE A DAY    Morbid obesity (H)  Continue to work on weight loss.    Screening for prostate cancer  - PSA, screen; Future  - PSA, screen      The risks, benefits and treatment options of prescribed medications or other treatments have been discussed with the patient. The patient verbalized their understanding and should call or follow up if no improvement or if they develop further problems.  RAVEN Graham CNP  [facility]                Salud Carrasquillo is a 63 year old, presenting for the following health issues:  Hypertension (Renew meds), Lipids (Renew meds), Health Maintenance (Declined vaccines ), and LAB REQUEST (Fasting labs and psa requested)        9/11/2023     8:33 AM   Additional Questions   Roomed by Drew THOMAS CMA   Accompanied by self         9/11/2023     8:33 AM   Patient Reported Additional Medications   Patient reports taking the following new medications Zinc - 1 tablet daily , unknown dose       History of Present Illness       Hypertension: He presents for follow up of hypertension.  He does check blood pressure  regularly outside of the clinic. Outpatient blood pressures have not  "been over 140/90. He follows a low salt diet.     He eats 0-1 servings of fruits and vegetables daily.He consumes 0 sweetened beverage(s) daily.He exercises with enough effort to increase his heart rate 60 or more minutes per day.  He exercises with enough effort to increase his heart rate 7 days per week.   He is taking medications regularly.     Has been out of the hydrochlorothiazide       Obesity:    Wt Readings from Last 4 Encounters:   09/11/23 (!) 165.2 kg (364 lb 3.2 oz)   04/03/23 (!) 168.8 kg (372 lb 1.6 oz)   08/10/22 (!) 167.4 kg (369 lb)   07/09/21 (!) 163.3 kg (360 lb)         Hyperlipidemia Follow-Up    Are you regularly taking any medication or supplement to lower your cholesterol?   Yes- atorvastatin  Are you having muscle aches or other side effects that you think could be caused by your cholesterol lowering medication?  No      Medication Followup of Flomax for urinary frequency  Taking Medication as prescribed: yes  Side Effects:  None  Medication Helping Symptoms:  yes          Review of Systems   Constitutional, HEENT, cardiovascular, pulmonary, gi and gu systems are negative, except as otherwise noted.      Objective    BP (!) 158/108 (BP Location: Right arm, Patient Position: Sitting, Cuff Size: Adult Large)   Pulse 69   Temp 97  F (36.1  C) (Tympanic)   Resp 20   Ht 1.778 m (5' 10\")   Wt (!) 165.2 kg (364 lb 3.2 oz)   SpO2 96%   BMI 52.26 kg/m    Body mass index is 52.26 kg/m .  Physical Exam   GENERAL: healthy, alert and no distress  NECK: no adenopathy, no asymmetry, masses, or scars and thyroid normal to palpation  RESP: lungs clear to auscultation - no rales, rhonchi or wheezes  CV: regular rate and rhythm, normal S1 S2, no S3 or S4, no murmur, click or rub, no peripheral edema and peripheral pulses strong  MS: no gross musculoskeletal defects noted, no edema                      "

## 2023-09-13 LAB
TESTOST FREE SERPL-MCNC: 3 NG/DL
TESTOST SERPL-MCNC: 111 NG/DL (ref 240–950)
TESTOST SERPL-MCNC: 121 NG/DL (ref 240–950)

## 2023-09-13 RX ORDER — TESTOSTERONE 20.25 MG/1.25G
GEL TOPICAL
Qty: 75 G | Refills: 5 | Status: SHIPPED | OUTPATIENT
Start: 2023-09-13

## 2023-09-13 NOTE — TELEPHONE ENCOUNTER
Will renew at current dose but level is low.  Patient missed last visit.  He will need to reschedule for evaluation before any further dose adjustments or refills.

## 2023-09-20 NOTE — TELEPHONE ENCOUNTER
Sent patient a letter letting him know that he will need to schedule a follow up appointment. No further refills without an appointment.    Joleen Tate RN on 9/20/2023 at 8:30 AM

## 2023-10-23 ENCOUNTER — TELEPHONE (OUTPATIENT)
Dept: FAMILY MEDICINE | Facility: CLINIC | Age: 63
End: 2023-10-23
Payer: COMMERCIAL

## 2023-10-26 NOTE — TELEPHONE ENCOUNTER
"Patient Quality Outreach    Patient is due for the following:   Hypertension -  BP check    Next Steps:   Schedule a nurse only visit for BP recheck    Type of outreach:    Phone, spoke to patient/parent. Patient/parent will call back to schedule. Patient stated that he will get it scheduled \"over here because it is too far to drive\". Unwilling to be scheduled at this time.      Questions for provider review:    None           Joleen LINO LPN        "

## 2023-11-01 ENCOUNTER — TELEPHONE (OUTPATIENT)
Dept: FAMILY MEDICINE | Facility: CLINIC | Age: 63
End: 2023-11-01

## 2023-11-01 NOTE — TELEPHONE ENCOUNTER
General Call      Reason for Call:  patient had his bp check at chiropractic office bp  136/91--right arm; denies sx, no headaches nor dizziness.-- he is see chiro for knee pain, this is helping, he wants to avoid future knee replacement.      Patient reports he was eating a lot of tumeric during last OV, per patient this cause bp to become elevated.       Okay to leave a detailed message?: Yes at Home number on file [phone] (home)

## 2023-11-02 NOTE — TELEPHONE ENCOUNTER
"Pt was called.  He states he sees chiro for his left knee & is having soft tissue US done & this is \"helping immensely\". States BP was 136/91.    Pt states he takes amlodipine, hydrochlorothiazide & losartan.    Pt also takes tumeric & riya & takes around 900mg of each at bedtime. Pt states he thinks these are off setting the Rx'd meds & causing higher BP. Pt will stop taking the tumeric & riya & only take BP meds & aspirin in the morning.    Pt was called for quality outreach on 10/26 for RN BP check.  Pt states he is in the middle of picking crops & cannot come to clinic right now.   He will make an RN BP appt in around 3 weeks    Routing to provider as ERIC Godinez RN    "

## 2024-03-22 ENCOUNTER — VIRTUAL VISIT (OUTPATIENT)
Dept: FAMILY MEDICINE | Facility: CLINIC | Age: 64
End: 2024-03-22
Payer: COMMERCIAL

## 2024-03-22 DIAGNOSIS — H10.31 ACUTE BACTERIAL CONJUNCTIVITIS OF RIGHT EYE: Primary | ICD-10-CM

## 2024-03-22 PROCEDURE — 99213 OFFICE O/P EST LOW 20 MIN: CPT | Mod: 95 | Performed by: NURSE PRACTITIONER

## 2024-03-22 RX ORDER — POLYMYXIN B SULFATE AND TRIMETHOPRIM 1; 10000 MG/ML; [USP'U]/ML
SOLUTION OPHTHALMIC
Qty: 10 ML | Refills: 0 | Status: SHIPPED | OUTPATIENT
Start: 2024-03-22 | End: 2024-03-29

## 2024-03-22 NOTE — PROGRESS NOTES
Foreign is a 63 year old who is being evaluated via a billable video visit.    How would you like to obtain your AVS? Mail a copy  If the video visit is dropped, the invitation should be resent by: Text to cell phone: [phone]  Will anyone else be joining your video visit? No        Assessment & Plan     Acute bacterial conjunctivitis of right eye  Discussed limitations of diagnosis on this video visit.  Patient's connection was bad; video quality was poor.  Patient would like to be treated for pinkeye.  Agreed to give prescription for antibiotic eyedrops to be used over the weekend.  If no improvement by Monday, patient will need to be seen in person.  - polymixin b-trimethoprim (POLYTRIM) 06173-9.1 UNIT/ML-% ophthalmic solution; 1 drop in affected eye(s) every 3 hrs while awake for 5-7 days until resolved - max 6 doses per day    1. Finish full course of antibiotics   2. Perform frequent hand washing   3. Wash sheets and towels in hot water   4. Contagious until on antibiotics for 24 hours  5. Avoid wearing contact lenses while infection present and discard any mascara or eye make up that was used.      The risks, benefits and treatment options of prescribed medications or other treatments have been discussed with the patient. The patient verbalized their understanding and should call or follow up if no improvement or if they develop further problems.  Joleen Garcia CNP              Subjective   Foreign is a 63 year old, presenting for the following health issues:  Eye Problem        9/11/2023     8:33 AM   Additional Questions   Roomed by Drew THOMAS CMA   Accompanied by self     HPI     Eye(s) Problem  R/o pink eye  Onset/Duration: yesterday morning  Description:   Location: Right   Pain: a little   Redness: YES  Accompanying Signs & Symptoms:  Discharge/mattering: YES  Watering all day long  Swelling: No  Visual changes: No  Fever: No  Nasal Congestion: No  Bothered by bright lights: No  History:  Trauma:  No  Foreign body exposure: possibly an eye lash  Wearing contacts: No  Precipitating or alleviating factors: None  Therapies tried and outcome: None    Patient denies any possibility of foreign body in eye.  He denies any history of seasonal allergies          Review of Systems  Constitutional, HEENT, cardiovascular, pulmonary, gi and gu systems are negative, except as otherwise noted.      Objective    Vitals - Patient Reported  Systolic (Patient Reported): 132  Diastolic (Patient Reported): 80  Pain Score: No Pain (0)      Vitals:  No vitals were obtained today due to virtual visit.    Physical Exam   GENERAL: alert and no distress  EYES: Unable to examine due to poor video quality          Video-Visit Details    Type of service:  Video Visit   Originating Location (pt. Location): Home    Distant Location (provider location):  On-site  Platform used for Video Visit: Jerry  Signed Electronically by: RAVEN Graham CNP

## 2024-04-27 DIAGNOSIS — I10 BENIGN ESSENTIAL HYPERTENSION: ICD-10-CM

## 2024-04-29 RX ORDER — AMLODIPINE BESYLATE 5 MG/1
5 TABLET ORAL DAILY
Qty: 90 TABLET | Refills: 1 | Status: SHIPPED | OUTPATIENT
Start: 2024-04-29

## 2024-07-24 NOTE — NURSING NOTE
"Foreign Pond's chief complaint for this visit includes:  Chief Complaint   Patient presents with     Left Shoulder - Pain     PCP: Uli Owens    Referring Provider:  No referring provider defined for this encounter.    BP (!) 140/94 (BP Location: Left arm, Patient Position: Sitting, Cuff Size: Adult Large)   Pulse 77   Ht 1.791 m (5' 10.5\")   Wt (!) 152.4 kg (335 lb 14.4 oz)   SpO2 95%   BMI 47.52 kg/m    Severe Pain (7)     Do you need any medication refills at today's visit? No    Left hand dominant    Charlene Brady CMA        " Weight loss is NOT easy for anyone.  Achieving weight loss goals have been a challenge for many people and a quick fad diet will NOT work the way the product or commercials promises us it will work.  Life changes are long lasting.      Weight loss comes in time and with changes that are personalized for you, NOT the same for everyone.       7% weight loss of your body weight will MAKE a HUGE difference in your health status including improvement to Diabetes and high blood pressure management.       Here are some quick calculations for 7% weight loss:      If you weigh 180 pounds then GOAL 7% weight loss would be 12.5 pounds.  If you weigh 190 pounds then GOAL 7% weight loss would be 13.0 pounds.  If you weigh 200 pounds then GOAL 7% weight loss would be 14.0 pounds.  If you weigh 210 pounds then GOAL 7% weight loss would be 14.7 pounds.  If you weigh 220 pounds then GOAL 7% weight loss would be 15.5 pounds.  If you weigh 230 pounds then GOAL 7% weight loss would be 16.0 pounds.  If you weigh 240 pounds then GOAL 7% weight loss would be 17.0 pounds.     If you weight over 250 pounds then a weight loss of >17 pounds is recommended.       Quick tips:  1. Track food intake with an janay or tracker like www.PrintFupal.com, sparkpeople.com, loseit.com, calorieking.com.   Reduce calorie intake by 500 a day to help lose weight but do NOT eat less than 1400 calories a day as this will turn off your metabolism and result in no weight loss at all.       2. Higher protein and lower carbohydrate intake works well for many but do NOT eliminate ONE food group completely as this is difficult to maintain in the long-term and will result in gaining back the weight quickly.       3. Track your steps with janay on phone, Heart janay on iphone, or a pedometer such as fitbit or Voucherlinkbone Up 24 to help you reach 10,000 steps daily.       4.  Many patients do well with a carbohydrate level daily that is less than 130 g of carbs.  In addition the  protein level should be over 75 g of protein in the full day.

## 2025-01-27 ENCOUNTER — TELEPHONE (OUTPATIENT)
Dept: FAMILY MEDICINE | Facility: CLINIC | Age: 65
End: 2025-01-27
Payer: COMMERCIAL

## 2025-01-27 NOTE — TELEPHONE ENCOUNTER
Huddled with PCP, if he has a bp cuff at home. He can take his blood pressures at home and let us know what he is getting at home.    Called and spoke with patient. He states he does not have an at home cuff but he will go buy one. I was informing him to take a few at home and call us back with his readings and he hung up. Patient expressed understanding even though he disconnected early.     Jadyn Mcmillan MA

## 2025-01-27 NOTE — TELEPHONE ENCOUNTER
Patient Quality Outreach    Patient is due for the following:   Hypertension -  BP check    Action(s) Taken:   Schedule a nurse only visit for Blood pressure check.    Type of outreach:    Phone, spoke to patient/parent. Patient states he is willing to get his blood pressure checked but he lives in [place] and the closest [place] is [place] (45 minutes away). He states there is an [facility] that's closer. What would you recommend he do?     Questions for provider review:    See above.           Jadyn Mcmillan

## 2025-02-10 ENCOUNTER — TELEPHONE (OUTPATIENT)
Dept: FAMILY MEDICINE | Facility: CLINIC | Age: 65
End: 2025-02-10
Payer: COMMERCIAL

## 2025-02-10 DIAGNOSIS — I10 BENIGN ESSENTIAL HYPERTENSION: Primary | ICD-10-CM

## 2025-02-10 NOTE — TELEPHONE ENCOUNTER
FYI - Status Update    Who is Calling: patient was told to call and relay blood pressure readings from home. He did not have dates or times when he took these recordings. Routing to provider as an FYI.    Update: 139/90, 135/91, 139/95, 121/85 (believes might not be accurate), 138/99, 144/91    Taking medication as prescribed. Declined to speak with a nurse but wants to know if those are high?    Does caller want a call/response back: Yes     Okay to leave a detailed message?: Yes at Home number on file [phone] (home)    Adela Boss on 2/10/2025 at 2:50 PM

## 2025-02-11 RX ORDER — AMLODIPINE BESYLATE 10 MG/1
10 TABLET ORAL DAILY
Qty: 90 TABLET | Refills: 3 | Status: SHIPPED | OUTPATIENT
Start: 2025-02-11

## 2025-02-11 NOTE — TELEPHONE ENCOUNTER
Called and spoke with patient, gave them Joleen Garcia's message below. Patient expressed understanding. No further questions.    Jadyn Mcmillan MA

## 2025-02-11 NOTE — TELEPHONE ENCOUNTER
Blood pressures are higher than goal.  Recommend increasing amlodipine to 10 mg daily.  Patient should call back with updated blood pressures in 1 month.  Joleen Garcia, CNP

## 2025-03-23 ENCOUNTER — APPOINTMENT (OUTPATIENT)
Dept: ULTRASOUND IMAGING | Facility: CLINIC | Age: 65
End: 2025-03-23
Attending: EMERGENCY MEDICINE
Payer: COMMERCIAL

## 2025-03-23 ENCOUNTER — APPOINTMENT (OUTPATIENT)
Dept: GENERAL RADIOLOGY | Facility: CLINIC | Age: 65
End: 2025-03-23
Attending: EMERGENCY MEDICINE
Payer: COMMERCIAL

## 2025-03-23 ENCOUNTER — HOSPITAL ENCOUNTER (EMERGENCY)
Facility: CLINIC | Age: 65
Discharge: HOME | End: 2025-03-23
Attending: EMERGENCY MEDICINE | Admitting: EMERGENCY MEDICINE
Payer: COMMERCIAL

## 2025-03-23 VITALS
DIASTOLIC BLOOD PRESSURE: 85 MMHG | HEIGHT: 71 IN | WEIGHT: 315 LBS | RESPIRATION RATE: 7 BRPM | HEART RATE: 63 BPM | BODY MASS INDEX: 44.1 KG/M2 | OXYGEN SATURATION: 95 % | TEMPERATURE: 97.6 F | SYSTOLIC BLOOD PRESSURE: 125 MMHG

## 2025-03-23 DIAGNOSIS — R07.9 CHEST PAIN, UNSPECIFIED TYPE: ICD-10-CM

## 2025-03-23 DIAGNOSIS — R10.13 EPIGASTRIC PAIN: ICD-10-CM

## 2025-03-23 LAB
ALBUMIN SERPL BCG-MCNC: 4.3 G/DL (ref 3.5–5.2)
ALP SERPL-CCNC: 79 U/L (ref 40–150)
ALT SERPL W P-5'-P-CCNC: 27 U/L (ref 0–70)
ANION GAP SERPL CALCULATED.3IONS-SCNC: 11 MMOL/L (ref 7–15)
AST SERPL W P-5'-P-CCNC: 26 U/L (ref 0–45)
ATRIAL RATE - MUSE: 67 BPM
BASOPHILS # BLD AUTO: 0.1 10E3/UL (ref 0–0.2)
BASOPHILS NFR BLD AUTO: 1 %
BILIRUB SERPL-MCNC: 0.5 MG/DL
BUN SERPL-MCNC: 14.4 MG/DL (ref 8–23)
CALCIUM SERPL-MCNC: 9.7 MG/DL (ref 8.8–10.4)
CHLORIDE SERPL-SCNC: 97 MMOL/L (ref 98–107)
CREAT SERPL-MCNC: 0.92 MG/DL (ref 0.67–1.17)
D DIMER PPP FEU-MCNC: <0.27 UG/ML FEU (ref 0–0.5)
DIASTOLIC BLOOD PRESSURE - MUSE: NORMAL MMHG
EGFRCR SERPLBLD CKD-EPI 2021: >90 ML/MIN/1.73M2
EOSINOPHIL # BLD AUTO: 0.4 10E3/UL (ref 0–0.7)
EOSINOPHIL NFR BLD AUTO: 4 %
ERYTHROCYTE [DISTWIDTH] IN BLOOD BY AUTOMATED COUNT: 13.2 % (ref 10–15)
GLUCOSE SERPL-MCNC: 96 MG/DL (ref 70–99)
HCO3 SERPL-SCNC: 30 MMOL/L (ref 22–29)
HCT VFR BLD AUTO: 45.6 % (ref 40–53)
HGB BLD-MCNC: 15.4 G/DL (ref 13.3–17.7)
IMM GRANULOCYTES # BLD: 0 10E3/UL
IMM GRANULOCYTES NFR BLD: 0 %
INTERPRETATION ECG - MUSE: NORMAL
LIPASE SERPL-CCNC: 30 U/L (ref 13–60)
LYMPHOCYTES # BLD AUTO: 2.1 10E3/UL (ref 0.8–5.3)
LYMPHOCYTES NFR BLD AUTO: 20 %
MCH RBC QN AUTO: 27.8 PG (ref 26.5–33)
MCHC RBC AUTO-ENTMCNC: 33.8 G/DL (ref 31.5–36.5)
MCV RBC AUTO: 83 FL (ref 78–100)
MONOCYTES # BLD AUTO: 0.8 10E3/UL (ref 0–1.3)
MONOCYTES NFR BLD AUTO: 7 %
NEUTROPHILS # BLD AUTO: 7 10E3/UL (ref 1.6–8.3)
NEUTROPHILS NFR BLD AUTO: 68 %
NRBC # BLD AUTO: 0 10E3/UL
NRBC BLD AUTO-RTO: 0 /100
P AXIS - MUSE: -4 DEGREES
PLATELET # BLD AUTO: 313 10E3/UL (ref 150–450)
POTASSIUM SERPL-SCNC: 3.6 MMOL/L (ref 3.4–5.3)
PR INTERVAL - MUSE: 168 MS
PROT SERPL-MCNC: 7.9 G/DL (ref 6.4–8.3)
QRS DURATION - MUSE: 90 MS
QT - MUSE: 412 MS
QTC - MUSE: 435 MS
R AXIS - MUSE: -19 DEGREES
RBC # BLD AUTO: 5.53 10E6/UL (ref 4.4–5.9)
SODIUM SERPL-SCNC: 138 MMOL/L (ref 135–145)
SYSTOLIC BLOOD PRESSURE - MUSE: NORMAL MMHG
T AXIS - MUSE: 47 DEGREES
TROPONIN T SERPL HS-MCNC: 11 NG/L
TROPONIN T SERPL HS-MCNC: 9 NG/L
VENTRICULAR RATE- MUSE: 67 BPM
WBC # BLD AUTO: 10.3 10E3/UL (ref 4–11)

## 2025-03-23 PROCEDURE — 85379 FIBRIN DEGRADATION QUANT: CPT | Performed by: EMERGENCY MEDICINE

## 2025-03-23 PROCEDURE — 76705 ECHO EXAM OF ABDOMEN: CPT

## 2025-03-23 PROCEDURE — 250N000009 HC RX 250: Performed by: EMERGENCY MEDICINE

## 2025-03-23 PROCEDURE — 83690 ASSAY OF LIPASE: CPT | Performed by: EMERGENCY MEDICINE

## 2025-03-23 PROCEDURE — 36415 COLL VENOUS BLD VENIPUNCTURE: CPT | Performed by: EMERGENCY MEDICINE

## 2025-03-23 PROCEDURE — 93005 ELECTROCARDIOGRAM TRACING: CPT

## 2025-03-23 PROCEDURE — 84484 ASSAY OF TROPONIN QUANT: CPT | Performed by: EMERGENCY MEDICINE

## 2025-03-23 PROCEDURE — 250N000013 HC RX MED GY IP 250 OP 250 PS 637: Performed by: EMERGENCY MEDICINE

## 2025-03-23 PROCEDURE — 71046 X-RAY EXAM CHEST 2 VIEWS: CPT

## 2025-03-23 PROCEDURE — 99285 EMERGENCY DEPT VISIT HI MDM: CPT | Mod: 25

## 2025-03-23 PROCEDURE — 80053 COMPREHEN METABOLIC PANEL: CPT | Performed by: EMERGENCY MEDICINE

## 2025-03-23 PROCEDURE — 85025 COMPLETE CBC W/AUTO DIFF WBC: CPT | Performed by: EMERGENCY MEDICINE

## 2025-03-23 RX ORDER — ASPIRIN 81 MG/1
324 TABLET, CHEWABLE ORAL ONCE
Status: COMPLETED | OUTPATIENT
Start: 2025-03-23 | End: 2025-03-23

## 2025-03-23 RX ORDER — LIDOCAINE HYDROCHLORIDE 20 MG/ML
10 SOLUTION OROPHARYNGEAL ONCE
Status: COMPLETED | OUTPATIENT
Start: 2025-03-23 | End: 2025-03-23

## 2025-03-23 RX ORDER — FAMOTIDINE 20 MG/1
20 TABLET, FILM COATED ORAL ONCE
Status: COMPLETED | OUTPATIENT
Start: 2025-03-23 | End: 2025-03-23

## 2025-03-23 RX ORDER — MAGNESIUM HYDROXIDE/ALUMINUM HYDROXICE/SIMETHICONE 120; 1200; 1200 MG/30ML; MG/30ML; MG/30ML
15 SUSPENSION ORAL ONCE
Status: COMPLETED | OUTPATIENT
Start: 2025-03-23 | End: 2025-03-23

## 2025-03-23 RX ADMIN — LIDOCAINE HYDROCHLORIDE 10 ML: 20 SOLUTION ORAL at 22:10

## 2025-03-23 RX ADMIN — ALUMINUM HYDROXIDE, MAGNESIUM HYDROXIDE, AND SIMETHICONE 15 ML: 200; 200; 20 SUSPENSION ORAL at 22:10

## 2025-03-23 RX ADMIN — ASPIRIN 324 MG: 81 TABLET, CHEWABLE ORAL at 20:05

## 2025-03-23 RX ADMIN — FAMOTIDINE 20 MG: 20 TABLET, FILM COATED ORAL at 22:09

## 2025-03-23 ASSESSMENT — ACTIVITIES OF DAILY LIVING (ADL)
ADLS_ACUITY_SCORE: 44

## 2025-03-23 ASSESSMENT — COLUMBIA-SUICIDE SEVERITY RATING SCALE - C-SSRS
2. HAVE YOU ACTUALLY HAD ANY THOUGHTS OF KILLING YOURSELF IN THE PAST MONTH?: NO
1. IN THE PAST MONTH, HAVE YOU WISHED YOU WERE DEAD OR WISHED YOU COULD GO TO SLEEP AND NOT WAKE UP?: NO
6. HAVE YOU EVER DONE ANYTHING, STARTED TO DO ANYTHING, OR PREPARED TO DO ANYTHING TO END YOUR LIFE?: NO

## 2025-03-24 NOTE — ED PROVIDER NOTES
"  Emergency Department Note      History of Present Illness     Chief Complaint   Chest Pain      HPI   Foreign Pond is a 64 year old male presenting for evaluation of chest pain. The patient reports that he had an onset of epigastric/lower chest pain while sitting earlier today.  He has had a cough for the past week and also endorses abdominal pain. The patient adds that he has had his gallbladder removed and that this pain is similar in intensity.  Pain has been constant ache and is reproducible with palpation.  He denies leg swelling, fever, or history of diabetes. He is followed by Dr. Medley.    Independent Historian   None    Review of External Notes   None    Past Medical History     Medical History and Problem List   Asthma  benign prostatic hyperplasia  Hypercholesterolemia  Hypertension    Medications   Amlodipine  Aspirin 81 mg  Atorvastatin  Hydrochlorathiazide  Losartan    Surgical History   Arthroscopy shoulder, left  Colonoscopy x2  Create eardrum opening, local anesthesia  HC ECP with cataract surgery  Laparoscopic cholecystectomy  LASIK bilateral  Vasectomy    Physical Exam     Patient Vitals for the past 24 hrs:   BP Temp Temp src Pulse Resp SpO2 Height Weight   03/23/25 2200 125/85 -- -- 63 (!) 7 95 % -- --   03/23/25 2100 132/79 -- -- 65 (!) 9 95 % -- --   03/23/25 1910 (!) 154/94 -- -- 68 18 96 % -- --   03/23/25 1909 -- 97.6  F (36.4  C) Oral -- -- -- 1.803 m (5' 11\") (!) 158.8 kg (350 lb)     Physical Exam  General: Alert and cooperative with exam. Patient in mild distress. Normal mentation. Obese.  Nontoxic/non-ill appearance  Head:  Scalp is NC/AT  Eyes:  No scleral icterus, PERRL  ENT:  The external nose and ears are normal. The oropharynx is normal and without erythema; mucus membranes are moist. Uvula midline, no evidence of deep space infection.  Neck:  Normal range of motion without rigidity.  CV:  Regular rate and rhythm    No pathologic murmur   Resp:  Breath sounds are clear " bilaterally    Non-labored, no retractions or accessory muscle use  GI:  Abdomen is soft, no distension, mild epigastric and right upper quadrant tenderness. No peritoneal signs  MS:  No lower extremity edema   Skin:  Warm and dry, No rash or lesions noted.  Neuro: Oriented x 3. No gross motor deficits.     Diagnostics     Lab Results   Labs Ordered and Resulted from Time of ED Arrival to Time of ED Departure   COMPREHENSIVE METABOLIC PANEL - Abnormal       Result Value    Sodium 138      Potassium 3.6      Carbon Dioxide (CO2) 30 (*)     Anion Gap 11      Urea Nitrogen 14.4      Creatinine 0.92      GFR Estimate >90      Calcium 9.7      Chloride 97 (*)     Glucose 96      Alkaline Phosphatase 79      AST 26      ALT 27      Protein Total 7.9      Albumin 4.3      Bilirubin Total 0.5     TROPONIN T, HIGH SENSITIVITY - Normal    Troponin T, High Sensitivity 11     LIPASE - Normal    Lipase 30     D DIMER QUANTITATIVE - Normal    D-Dimer Quantitative <0.27     TROPONIN T, HIGH SENSITIVITY - Normal    Troponin T, High Sensitivity 9     CBC WITH PLATELETS AND DIFFERENTIAL    WBC Count 10.3      RBC Count 5.53      Hemoglobin 15.4      Hematocrit 45.6      MCV 83      MCH 27.8      MCHC 33.8      RDW 13.2      Platelet Count 313      % Neutrophils 68      % Lymphocytes 20      % Monocytes 7      % Eosinophils 4      % Basophils 1      % Immature Granulocytes 0      NRBCs per 100 WBC 0      Absolute Neutrophils 7.0      Absolute Lymphocytes 2.1      Absolute Monocytes 0.8      Absolute Eosinophils 0.4      Absolute Basophils 0.1      Absolute Immature Granulocytes 0.0      Absolute NRBCs 0.0         Imaging   US Abdomen Limited   Final Result   IMPRESSION:   1.  Gallbladder removed.      2.  No bile duct dilatation.      3.  Moderate diffuse hepatic steatosis.            Chest XR,  PA & LAT   Final Result   IMPRESSION: Small amount of atelectasis or scarring in the bases, lungs otherwise clear. Heart size and pulmonary  vascularity normal. No effusion and no pneumothorax. Left shoulder arthroplasty.          EKG   ECG results from 03/23/25   EKG 12-lead, tracing only     Value    Systolic Blood Pressure     Diastolic Blood Pressure     Ventricular Rate 67    Atrial Rate 67    NH Interval 168    QRS Duration 90        QTc 435    P Axis -4    R AXIS -19    T Axis 47    Interpretation ECG      Sinus rhythm with sinus arrhythmia  Possible Inferior infarct , age undetermined  Cannot rule out Anterior infarct , age undetermined  Abnormal ECG    Confirmed by GENERATED REPORT, COMPUTER ([id number]),  Aldair Abrams ([id number]) on 3/23/2025 7:23:47 PM     No significant change from ECG dated 10/2017.    Independent Interpretation   CXR: No pneumothorax, infiltrate, or pleural effusion.    ED Course      Medications Administered   Medications   aspirin (ASA) chewable tablet 324 mg (324 mg Oral $Given 3/23/25 2005)   alum & mag hydroxide-simethicone (MAALOX) suspension 15 mL (15 mLs Oral $Given 3/23/25 2210)   lidocaine (viscous) (XYLOCAINE) 2 % solution 10 mL (10 mLs Mouth/Throat $Given 3/23/25 2210)   famotidine (PEPCID) tablet 20 mg (20 mg Oral $Given 3/23/25 2209)       Procedures   Procedures     Discussion of Management   None    ED Course   ED Course as of 03/24/25 0129   Sun Mar 23, 2025   2227 I rechecked the patient and discussed care plan and discharge.       Additional Documentation  None    Medical Decision Making / Diagnosis     CMS Diagnoses: None    MIPS       None    [facility]   Foreign Pond is a 64 year old male who presents with chest pain/epigastric pain.  The work up in the Emergency Department is negative.  I considered a broad differential diagnosis in this patient including life-threatening etiologies such as acute coronary syndrome, myocardial infarction, pulmonary embolism, acute aortic dissection, myocarditis, pericarditis, acute valvular insufficiency amongst others.  Other causes considered for this patient included  pneumonia, pneumothorax, chest wall source, pericarditis, pancreatitis, gastritis, biliary pathology, esophageal spasm/GERD, pleurisy, esophageal spasm, etc.  No serious etiology for the chest pain/epigastric pain were detected today during this visit.  Patient's troponin is normal x 2 and EKG is without evidence of acute ischemia, infarction, or significant arrhythmia.  Patient's pain is easily reproduced on exam.  Low clinical suspicion for ACS, PE, or dissection.  Labs including LFTs, CBC, BMP, lipase, and D-dimer all returned essentially normal.  Potential component of gastritis/reflux versus costochondritis secondary to recent viral illness.  Chest x-ray without significant findings.  Discussed supportive care.  Close follow up with primary care is indicated should the pain continue, as further work up may be performed; this was made clear to the patient, who understands.  Return precautions discussed.  Patient discharged home    Disposition   The patient was discharged.     Diagnosis     ICD-10-CM    1. Epigastric pain  R10.13       2. Chest pain, unspecified type  R07.9            Discharge Medications   Discharge Medication List as of 3/23/2025 10:28 PM            Scribe Disclosure:  I, Marc Baez, am serving as a scribe at 7:52 PM on 3/23/2025 to document services personally performed by Riccardo Mattson DO based on my observations and the provider's statements to me.        Riccardo Mattson DO  03/24/25 1154

## 2025-03-24 NOTE — DISCHARGE INSTRUCTIONS
Pepcid as needed for stomach pain (20 mg twice daily as needed)  Tylenol and/or ibuprofen as needed for additional pain relief

## 2025-03-24 NOTE — ED TRIAGE NOTES
Pt c/o midsternal chest pain described as burning that radiates down left arm. Pt reports pain has been present for last two days but became more intense today with SOB.      Triage Assessment (Adult)       Row Name 03/23/25 1912          Triage Assessment    Airway WDL WDL        Respiratory WDL    Respiratory WDL X;all     Rhythm/Pattern, Respiratory shortness of breath        Cardiac WDL    Cardiac WDL X;chest pain        Chest Pain Assessment    Chest Pain Location epigastric;midsternal;arm, left     Character burning        Peripheral/Neurovascular WDL    Peripheral Neurovascular WDL WDL        Cognitive/Neuro/Behavioral WDL    Cognitive/Neuro/Behavioral WDL WDL        [place] Coma Scale    Best Eye Response 4-->(E4) spontaneous     Best Motor Response 6-->(M6) obeys commands     Best Verbal Response 5-->(V5) oriented     [place] Coma Scale Score 15

## (undated) DEVICE — SU ETHIBOND 2 V-37 4X30" MX69G

## (undated) DEVICE — SYR 50ML CATH TIP W/O NDL 309620

## (undated) DEVICE — PREP DURAPREP 26ML APL 8630

## (undated) DEVICE — SOL HYDROGEN PEROXIDE 3% 4OZ BOTTLE F0010

## (undated) DEVICE — SYR 30ML LL W/O NDL 302832

## (undated) DEVICE — ESU PENCIL W/SMOKE EVAC NEPTUNE STRYKER 0703-046-000

## (undated) DEVICE — BONE CLEANING TIP INTERPULSE  0210-010-000

## (undated) DEVICE — SU CHROMIC 4-0 RB-1 27" U203H

## (undated) DEVICE — SUCTION IRR SYSTEM W/O TIP INTERPULSE HANDPIECE 0210-100-000

## (undated) DEVICE — DRAPE LAP W/ARMBOARD 29410

## (undated) DEVICE — BRUSH SURGICAL SCRUB W/PCMX 4457A

## (undated) DEVICE — PACK SET-UP STD 9102

## (undated) DEVICE — GLOVE PROTEXIS W/NEU-THERA 7.0  2D73TE70

## (undated) DEVICE — ESU ELEC NDL 1" COATED/INSULATED E1465

## (undated) DEVICE — PREP CHLORAPREP 26ML TINTED ORANGE  260815

## (undated) DEVICE — DRSG STERI STRIP 1/2X4" R1547

## (undated) DEVICE — STRAP KNEE/BODY 31143004

## (undated) DEVICE — ESU CLEANER TIP 31142717

## (undated) DEVICE — GLOVE PROTEXIS W/NEU-THERA 7.5  2D73TE75

## (undated) DEVICE — DRSG AQUACEL AG 3.5X9.75" HYDROFIBER 412011

## (undated) DEVICE — DRAPE POUCH INSTRUMENT 1018

## (undated) DEVICE — SOL WATER IRRIG 1000ML BOTTLE 07139-09

## (undated) DEVICE — SOL WATER IRRIG 1000ML BOTTLE 2F7114

## (undated) DEVICE — SU ETHIBOND 0 CT-1 CR 8X18" CX21D

## (undated) DEVICE — DRSG DRAIN 4X4" 7086

## (undated) DEVICE — SPONGE COTTONOID 1/2X1" 80-1402

## (undated) DEVICE — ESU EYE HIGH TEMP 65410-183

## (undated) DEVICE — COVER CAMERA IN-LIGHT DISP LT-C02

## (undated) DEVICE — BLADE CLIPPER 4406

## (undated) DEVICE — SUPPORTER ATHLETIC LG CLINITEX LF 67-1006

## (undated) DEVICE — BONE CEMENT MIXEVAC III HI VAC KIT  0206-015-000

## (undated) DEVICE — DRAPE IOBAN INCISE 23X17" 6650EZ

## (undated) DEVICE — RESTRAINT LIMB HOLDER ANKLE/WRIST FOAM W/QUICK RELEASE 2533

## (undated) DEVICE — NDL 30GA 0.5" 305106

## (undated) DEVICE — BLADE SAW SAGITTAL STRK 20.7X85X0.89MM 2108-109-000S13

## (undated) DEVICE — Device

## (undated) DEVICE — NDL SPINAL 18GA 3.5" 405184

## (undated) DEVICE — ESU ELEC NDL 1" E1552

## (undated) DEVICE — EYE PREP BETADINE 5% SOLUTION 30ML 0065-0411-30

## (undated) DEVICE — SU FIBERWIRE 2 38"  AR-7200

## (undated) DEVICE — SU MONOCRYL 3-0 PS-1 27" Y936H

## (undated) DEVICE — IMM KIT SHOULDER STABILIZATION 7210573

## (undated) DEVICE — LINEN ORTHO PACK 5446

## (undated) DEVICE — DRAIN HEMOVAC RESERVOIR KIT 10FR 1/8" MED 00-2550-002-10

## (undated) DEVICE — GLOVE PROTEXIS POWDER FREE 7.5 ORTHOPEDIC 2D73ET75

## (undated) DEVICE — SOL NACL 0.9% IRRIG 1000ML BOTTLE 2F7124

## (undated) DEVICE — SU MONOCRYL 4-0 PS-2 18" UND Y496G

## (undated) DEVICE — IMM KIT SHOULDER TMAX MASK FACE 7210559

## (undated) DEVICE — DRSG TEGADERM 4X4 3/4" 1626W

## (undated) DEVICE — ESU GROUND PAD ADULT W/CORD E7507

## (undated) DEVICE — SU SILK 2-0 TIE 12X30" A305H

## (undated) DEVICE — NDL 19GA 1.5"

## (undated) DEVICE — DRAPE U-DRAPE 1015NSD NON-STERILE

## (undated) DEVICE — GLOVE PROTEXIS POWDER FREE 7.0 ORTHOPEDIC 2D73ET70

## (undated) DEVICE — DRSG KERLIX FLUFFS X5

## (undated) DEVICE — DRAPE U-POUCH 34X29" 1067

## (undated) DEVICE — GOWN XLG DISP 9545

## (undated) DEVICE — SU CHROMIC 3-0 SH 27" G122H

## (undated) DEVICE — LINEN TOWEL PACK X5 5464

## (undated) DEVICE — DEVICE RETRIEVER HEWSON 71111579

## (undated) DEVICE — SPONGE LAP 18X18" X8435

## (undated) DEVICE — SYR BULB IRRIG 50ML LATEX FREE 0035280

## (undated) DEVICE — PACK MINOR SBA15MIFSE

## (undated) DEVICE — SUCTION MANIFOLD DORNOCH ULTRA CART UL-CL500

## (undated) DEVICE — SU MONOCRYL 2-0 SH 27" UND Y417H

## (undated) RX ORDER — LIDOCAINE HYDROCHLORIDE 10 MG/ML
INJECTION, SOLUTION EPIDURAL; INFILTRATION; INTRACAUDAL; PERINEURAL
Status: DISPENSED
Start: 2018-02-21

## (undated) RX ORDER — FENTANYL CITRATE 50 UG/ML
INJECTION, SOLUTION INTRAMUSCULAR; INTRAVENOUS
Status: DISPENSED
Start: 2020-01-07

## (undated) RX ORDER — ACETAMINOPHEN 325 MG/1
TABLET ORAL
Status: DISPENSED
Start: 2020-01-07

## (undated) RX ORDER — PHENYLEPHRINE HCL IN 0.9% NACL 1 MG/10 ML
SYRINGE (ML) INTRAVENOUS
Status: DISPENSED
Start: 2020-01-07

## (undated) RX ORDER — VANCOMYCIN HYDROCHLORIDE 1 G/20ML
INJECTION, POWDER, LYOPHILIZED, FOR SOLUTION INTRAVENOUS
Status: DISPENSED
Start: 2020-01-07

## (undated) RX ORDER — BUPIVACAINE HYDROCHLORIDE 2.5 MG/ML
INJECTION, SOLUTION INFILTRATION; PERINEURAL
Status: DISPENSED
Start: 2018-02-21

## (undated) RX ORDER — PROPOFOL 10 MG/ML
INJECTION, EMULSION INTRAVENOUS
Status: DISPENSED
Start: 2018-02-21

## (undated) RX ORDER — ACETAMINOPHEN 325 MG/1
TABLET ORAL
Status: DISPENSED
Start: 2018-02-21

## (undated) RX ORDER — FENTANYL CITRATE 50 UG/ML
INJECTION, SOLUTION INTRAMUSCULAR; INTRAVENOUS
Status: DISPENSED
Start: 2018-02-21

## (undated) RX ORDER — GABAPENTIN 300 MG/1
CAPSULE ORAL
Status: DISPENSED
Start: 2020-01-07

## (undated) RX ORDER — CEFAZOLIN SODIUM 1 G/3ML
INJECTION, POWDER, FOR SOLUTION INTRAMUSCULAR; INTRAVENOUS
Status: DISPENSED
Start: 2020-01-07

## (undated) RX ORDER — ONDANSETRON 2 MG/ML
INJECTION INTRAMUSCULAR; INTRAVENOUS
Status: DISPENSED
Start: 2018-02-21

## (undated) RX ORDER — DEXAMETHASONE SODIUM PHOSPHATE 4 MG/ML
INJECTION, SOLUTION INTRA-ARTICULAR; INTRALESIONAL; INTRAMUSCULAR; INTRAVENOUS; SOFT TISSUE
Status: DISPENSED
Start: 2018-02-21

## (undated) RX ORDER — GABAPENTIN 300 MG/1
CAPSULE ORAL
Status: DISPENSED
Start: 2018-02-21

## (undated) RX ORDER — CEFAZOLIN SODIUM 2 G/100ML
INJECTION, SOLUTION INTRAVENOUS
Status: DISPENSED
Start: 2018-02-21

## (undated) RX ORDER — CEFAZOLIN SODIUM IN 0.9 % NACL 3 G/100 ML
INTRAVENOUS SOLUTION, PIGGYBACK (ML) INTRAVENOUS
Status: DISPENSED
Start: 2020-01-07

## (undated) RX ORDER — CEFAZOLIN SODIUM 1 G/3ML
INJECTION, POWDER, FOR SOLUTION INTRAMUSCULAR; INTRAVENOUS
Status: DISPENSED
Start: 2018-02-21